# Patient Record
Sex: FEMALE | Race: WHITE | Employment: OTHER | URBAN - METROPOLITAN AREA
[De-identification: names, ages, dates, MRNs, and addresses within clinical notes are randomized per-mention and may not be internally consistent; named-entity substitution may affect disease eponyms.]

---

## 2018-12-01 ENCOUNTER — APPOINTMENT (OUTPATIENT)
Dept: ULTRASOUND IMAGING | Age: 63
End: 2018-12-01
Payer: COMMERCIAL

## 2018-12-01 ENCOUNTER — APPOINTMENT (OUTPATIENT)
Dept: GENERAL RADIOLOGY | Age: 63
End: 2018-12-01
Payer: COMMERCIAL

## 2018-12-01 ENCOUNTER — HOSPITAL ENCOUNTER (EMERGENCY)
Age: 63
Discharge: HOME | End: 2018-12-01
Attending: EMERGENCY MEDICINE
Payer: COMMERCIAL

## 2018-12-01 VITALS
HEART RATE: 90 BPM | DIASTOLIC BLOOD PRESSURE: 76 MMHG | SYSTOLIC BLOOD PRESSURE: 115 MMHG | TEMPERATURE: 98.7 F | OXYGEN SATURATION: 95 % | HEIGHT: 65 IN | BODY MASS INDEX: 29.99 KG/M2 | RESPIRATION RATE: 18 BRPM | WEIGHT: 180 LBS

## 2018-12-01 DIAGNOSIS — S40.021A CONTUSION OF RIGHT UPPER EXTREMITY, INITIAL ENCOUNTER: Primary | ICD-10-CM

## 2018-12-01 DIAGNOSIS — T79.6XXA TRAUMATIC RHABDOMYOLYSIS, INITIAL ENCOUNTER (HCC): ICD-10-CM

## 2018-12-01 LAB
ANION GAP SERPL CALCULATED.3IONS-SCNC: 13 MMOL/L (ref 7–16)
BACTERIA: NORMAL /HPF
BILIRUBIN URINE: NEGATIVE
BLOOD, URINE: NORMAL
BUN BLDV-MCNC: 15 MG/DL (ref 8–23)
CALCIUM SERPL-MCNC: 9.1 MG/DL (ref 8.6–10.2)
CHLORIDE BLD-SCNC: 101 MMOL/L (ref 98–107)
CLARITY: CLEAR
CO2: 26 MMOL/L (ref 22–29)
COLOR: YELLOW
CREAT SERPL-MCNC: 0.9 MG/DL (ref 0.5–1)
GFR AFRICAN AMERICAN: >60
GFR NON-AFRICAN AMERICAN: >60 ML/MIN/1.73
GLUCOSE BLD-MCNC: 98 MG/DL (ref 74–99)
GLUCOSE URINE: NEGATIVE MG/DL
HCT VFR BLD CALC: 42.4 % (ref 34–48)
HEMOGLOBIN: 13.1 G/DL (ref 11.5–15.5)
KETONES, URINE: NEGATIVE MG/DL
LEUKOCYTE ESTERASE, URINE: NEGATIVE
MCH RBC QN AUTO: 34.4 PG (ref 26–35)
MCHC RBC AUTO-ENTMCNC: 30.9 % (ref 32–34.5)
MCV RBC AUTO: 111.3 FL (ref 80–99.9)
NITRITE, URINE: NEGATIVE
PDW BLD-RTO: 17.9 FL (ref 11.5–15)
PH UA: 5.5 (ref 5–9)
PLATELET # BLD: 395 E9/L (ref 130–450)
PMV BLD AUTO: 11.2 FL (ref 7–12)
POTASSIUM SERPL-SCNC: 3.8 MMOL/L (ref 3.5–5)
PROTEIN UA: NEGATIVE MG/DL
RBC # BLD: 3.81 E12/L (ref 3.5–5.5)
RBC UA: NORMAL /HPF (ref 0–2)
SODIUM BLD-SCNC: 140 MMOL/L (ref 132–146)
SPECIFIC GRAVITY UA: 1.02 (ref 1–1.03)
TOTAL CK: 285 U/L (ref 20–180)
UROBILINOGEN, URINE: 0.2 E.U./DL
WBC # BLD: 4.7 E9/L (ref 4.5–11.5)
WBC UA: NORMAL /HPF (ref 0–5)

## 2018-12-01 PROCEDURE — 82550 ASSAY OF CK (CPK): CPT

## 2018-12-01 PROCEDURE — 73060 X-RAY EXAM OF HUMERUS: CPT

## 2018-12-01 PROCEDURE — 99284 EMERGENCY DEPT VISIT MOD MDM: CPT

## 2018-12-01 PROCEDURE — 80048 BASIC METABOLIC PNL TOTAL CA: CPT

## 2018-12-01 PROCEDURE — 81001 URINALYSIS AUTO W/SCOPE: CPT

## 2018-12-01 PROCEDURE — 85027 COMPLETE CBC AUTOMATED: CPT

## 2018-12-01 PROCEDURE — 93971 EXTREMITY STUDY: CPT

## 2018-12-01 RX ORDER — HYDROCODONE BITARTRATE AND ACETAMINOPHEN 5; 325 MG/1; MG/1
1 TABLET ORAL EVERY 6 HOURS PRN
Qty: 12 TABLET | Refills: 0 | Status: SHIPPED | OUTPATIENT
Start: 2018-12-01 | End: 2018-12-04

## 2018-12-01 ASSESSMENT — PAIN SCALES - GENERAL: PAINLEVEL_OUTOF10: 5

## 2018-12-01 ASSESSMENT — PAIN DESCRIPTION - ORIENTATION: ORIENTATION: RIGHT

## 2018-12-01 ASSESSMENT — PAIN DESCRIPTION - DESCRIPTORS: DESCRIPTORS: ACHING

## 2018-12-01 ASSESSMENT — PAIN DESCRIPTION - LOCATION: LOCATION: ARM

## 2018-12-01 ASSESSMENT — PAIN DESCRIPTION - PAIN TYPE: TYPE: ACUTE PAIN

## 2019-01-01 ENCOUNTER — ANESTHESIA (OUTPATIENT)
Dept: OPERATING ROOM | Age: 64
DRG: 168 | End: 2019-01-01
Payer: COMMERCIAL

## 2019-01-01 ENCOUNTER — APPOINTMENT (OUTPATIENT)
Dept: CT IMAGING | Age: 64
DRG: 168 | End: 2019-01-01
Payer: COMMERCIAL

## 2019-01-01 ENCOUNTER — HOSPITAL ENCOUNTER (INPATIENT)
Age: 64
LOS: 3 days | Discharge: HOME | DRG: 168 | End: 2019-12-11
Attending: EMERGENCY MEDICINE | Admitting: INTERNAL MEDICINE
Payer: COMMERCIAL

## 2019-01-01 ENCOUNTER — APPOINTMENT (OUTPATIENT)
Dept: GENERAL RADIOLOGY | Age: 64
DRG: 168 | End: 2019-01-01
Payer: COMMERCIAL

## 2019-01-01 ENCOUNTER — ANESTHESIA EVENT (OUTPATIENT)
Dept: OPERATING ROOM | Age: 64
DRG: 168 | End: 2019-01-01
Payer: COMMERCIAL

## 2019-01-01 ENCOUNTER — APPOINTMENT (OUTPATIENT)
Dept: ULTRASOUND IMAGING | Age: 64
DRG: 168 | End: 2019-01-01
Payer: COMMERCIAL

## 2019-01-01 VITALS — SYSTOLIC BLOOD PRESSURE: 129 MMHG | DIASTOLIC BLOOD PRESSURE: 86 MMHG | OXYGEN SATURATION: 100 %

## 2019-01-01 VITALS
RESPIRATION RATE: 18 BRPM | WEIGHT: 172.8 LBS | DIASTOLIC BLOOD PRESSURE: 78 MMHG | SYSTOLIC BLOOD PRESSURE: 140 MMHG | HEART RATE: 101 BPM | TEMPERATURE: 97.8 F | BODY MASS INDEX: 28.79 KG/M2 | OXYGEN SATURATION: 100 % | HEIGHT: 65 IN

## 2019-01-01 DIAGNOSIS — I26.99 BILATERAL PULMONARY EMBOLISM (HCC): Primary | ICD-10-CM

## 2019-01-01 LAB
ALBUMIN SERPL-MCNC: 3.4 G/DL (ref 3.5–5.2)
ALBUMIN SERPL-MCNC: 3.8 G/DL (ref 3.5–5.2)
ALP BLD-CCNC: 105 U/L (ref 35–104)
ALP BLD-CCNC: 164 U/L (ref 35–104)
ALT SERPL-CCNC: 6 U/L (ref 0–32)
ALT SERPL-CCNC: 7 U/L (ref 0–32)
ANION GAP SERPL CALCULATED.3IONS-SCNC: 12 MMOL/L (ref 7–16)
ANION GAP SERPL CALCULATED.3IONS-SCNC: 13 MMOL/L (ref 7–16)
ANISOCYTOSIS: ABNORMAL
AST SERPL-CCNC: 15 U/L (ref 0–31)
AST SERPL-CCNC: 34 U/L (ref 0–31)
ATYPICAL LYMPHOCYTE RELATIVE PERCENT: 3.5 % (ref 0–4)
BACTERIA: ABNORMAL /HPF
BASOPHILS ABSOLUTE: 0.14 E9/L (ref 0–0.2)
BASOPHILS ABSOLUTE: 0.32 E9/L (ref 0–0.2)
BASOPHILS ABSOLUTE: 0.33 E9/L (ref 0–0.2)
BASOPHILS ABSOLUTE: 0.39 E9/L (ref 0–0.2)
BASOPHILS RELATIVE PERCENT: 1 % (ref 0–2)
BASOPHILS RELATIVE PERCENT: 2 % (ref 0–2)
BASOPHILS RELATIVE PERCENT: 2.6 % (ref 0–2)
BASOPHILS RELATIVE PERCENT: 2.7 % (ref 0–2)
BILIRUB SERPL-MCNC: 0.3 MG/DL (ref 0–1.2)
BILIRUB SERPL-MCNC: <0.2 MG/DL (ref 0–1.2)
BILIRUBIN URINE: NEGATIVE
BLASTS RELATIVE PERCENT: 1 % (ref 0–0)
BLASTS RELATIVE PERCENT: 4 % (ref 0–0)
BLOOD, URINE: ABNORMAL
BUN BLDV-MCNC: 19 MG/DL (ref 8–23)
BUN BLDV-MCNC: 21 MG/DL (ref 8–23)
CALCIUM SERPL-MCNC: 8.9 MG/DL (ref 8.6–10.2)
CALCIUM SERPL-MCNC: 9.1 MG/DL (ref 8.6–10.2)
CHLORIDE BLD-SCNC: 100 MMOL/L (ref 98–107)
CHLORIDE BLD-SCNC: 98 MMOL/L (ref 98–107)
CLARITY: CLEAR
CO2: 23 MMOL/L (ref 22–29)
CO2: 26 MMOL/L (ref 22–29)
COLOR: YELLOW
CREAT SERPL-MCNC: 1.4 MG/DL (ref 0.5–1)
CREAT SERPL-MCNC: 1.4 MG/DL (ref 0.5–1)
EKG ATRIAL RATE: 93 BPM
EKG P AXIS: 54 DEGREES
EKG P-R INTERVAL: 134 MS
EKG Q-T INTERVAL: 348 MS
EKG QRS DURATION: 86 MS
EKG QTC CALCULATION (BAZETT): 432 MS
EKG R AXIS: 3 DEGREES
EKG T AXIS: 27 DEGREES
EKG VENTRICULAR RATE: 93 BPM
EOSINOPHILS ABSOLUTE: 0 E9/L (ref 0.05–0.5)
EOSINOPHILS RELATIVE PERCENT: 0 % (ref 0–6)
EPITHELIAL CELLS, UA: ABNORMAL /HPF
GFR AFRICAN AMERICAN: 46
GFR AFRICAN AMERICAN: 46
GFR NON-AFRICAN AMERICAN: 38 ML/MIN/1.73
GFR NON-AFRICAN AMERICAN: 38 ML/MIN/1.73
GLUCOSE BLD-MCNC: 101 MG/DL (ref 74–99)
GLUCOSE BLD-MCNC: 127 MG/DL (ref 74–99)
GLUCOSE URINE: NEGATIVE MG/DL
HCT VFR BLD CALC: 43.9 % (ref 34–48)
HCT VFR BLD CALC: 45.8 % (ref 34–48)
HCT VFR BLD CALC: 46.2 % (ref 34–48)
HCT VFR BLD CALC: 47.2 % (ref 34–48)
HEMOGLOBIN: 12.6 G/DL (ref 11.5–15.5)
HEMOGLOBIN: 13.1 G/DL (ref 11.5–15.5)
HEMOGLOBIN: 13.3 G/DL (ref 11.5–15.5)
HEMOGLOBIN: 13.5 G/DL (ref 11.5–15.5)
HYPERSEGMENTED NEUTROPHILS: ABNORMAL
HYPOCHROMIA: ABNORMAL
INFLUENZA A BY PCR: NOT DETECTED
INFLUENZA B BY PCR: NOT DETECTED
KETONES, URINE: NEGATIVE MG/DL
LACTIC ACID: 2 MMOL/L (ref 0.5–2.2)
LEUKOCYTE ESTERASE, URINE: NEGATIVE
LYMPHOCYTES ABSOLUTE: 0.64 E9/L (ref 1.5–4)
LYMPHOCYTES ABSOLUTE: 1.58 E9/L (ref 1.5–4)
LYMPHOCYTES ABSOLUTE: 1.6 E9/L (ref 1.5–4)
LYMPHOCYTES ABSOLUTE: 1.66 E9/L (ref 1.5–4)
LYMPHOCYTES RELATIVE PERCENT: 10.7 % (ref 20–42)
LYMPHOCYTES RELATIVE PERCENT: 11 % (ref 20–42)
LYMPHOCYTES RELATIVE PERCENT: 4 % (ref 20–42)
LYMPHOCYTES RELATIVE PERCENT: 9.6 % (ref 20–42)
MCH RBC QN AUTO: 28.2 PG (ref 26–35)
MCH RBC QN AUTO: 28.4 PG (ref 26–35)
MCH RBC QN AUTO: 28.4 PG (ref 26–35)
MCH RBC QN AUTO: 28.7 PG (ref 26–35)
MCHC RBC AUTO-ENTMCNC: 28.6 % (ref 32–34.5)
MCHC RBC AUTO-ENTMCNC: 28.6 % (ref 32–34.5)
MCHC RBC AUTO-ENTMCNC: 28.7 % (ref 32–34.5)
MCHC RBC AUTO-ENTMCNC: 28.8 % (ref 32–34.5)
MCV RBC AUTO: 98.5 FL (ref 80–99.9)
MCV RBC AUTO: 99.1 FL (ref 80–99.9)
MCV RBC AUTO: 99.2 FL (ref 80–99.9)
MCV RBC AUTO: 99.8 FL (ref 80–99.9)
METAMYELOCYTES RELATIVE PERCENT: 0.9 % (ref 0–1)
METAMYELOCYTES RELATIVE PERCENT: 1 % (ref 0–1)
MONOCYTES ABSOLUTE: 0.16 E9/L (ref 0.1–0.95)
MONOCYTES ABSOLUTE: 0.26 E9/L (ref 0.1–0.95)
MONOCYTES ABSOLUTE: 0.43 E9/L (ref 0.1–0.95)
MONOCYTES ABSOLUTE: 1.3 E9/L (ref 0.1–0.95)
MONOCYTES RELATIVE PERCENT: 1 % (ref 2–12)
MONOCYTES RELATIVE PERCENT: 1.7 % (ref 2–12)
MONOCYTES RELATIVE PERCENT: 3 % (ref 2–12)
MONOCYTES RELATIVE PERCENT: 8.9 % (ref 2–12)
MYELOCYTE PERCENT: 2 % (ref 0–0)
NEUTROPHILS ABSOLUTE: 10.62 E9/L (ref 1.8–7.3)
NEUTROPHILS ABSOLUTE: 11.31 E9/L (ref 1.8–7.3)
NEUTROPHILS ABSOLUTE: 12.1 E9/L (ref 1.8–7.3)
NEUTROPHILS ABSOLUTE: 14.15 E9/L (ref 1.8–7.3)
NEUTROPHILS RELATIVE PERCENT: 77.7 % (ref 43–80)
NEUTROPHILS RELATIVE PERCENT: 81.7 % (ref 43–80)
NEUTROPHILS RELATIVE PERCENT: 84 % (ref 43–80)
NEUTROPHILS RELATIVE PERCENT: 86 % (ref 43–80)
NITRITE, URINE: NEGATIVE
NUCLEATED RED BLOOD CELLS: 0.9 /100 WBC
NUCLEATED RED BLOOD CELLS: 0.9 /100 WBC
NUCLEATED RED BLOOD CELLS: 2 /100 WBC
OVALOCYTES: ABNORMAL
PDW BLD-RTO: 21.3 FL (ref 11.5–15)
PDW BLD-RTO: 21.4 FL (ref 11.5–15)
PDW BLD-RTO: 21.5 FL (ref 11.5–15)
PDW BLD-RTO: 21.6 FL (ref 11.5–15)
PH UA: 5.5 (ref 5–9)
PLATELET # BLD: 793 E9/L (ref 130–450)
PLATELET # BLD: 807 E9/L (ref 130–450)
PLATELET # BLD: 816 E9/L (ref 130–450)
PLATELET # BLD: 897 E9/L (ref 130–450)
PMV BLD AUTO: 10.8 FL (ref 7–12)
PMV BLD AUTO: 11 FL (ref 7–12)
PMV BLD AUTO: 11.1 FL (ref 7–12)
PMV BLD AUTO: 11.1 FL (ref 7–12)
POIKILOCYTES: ABNORMAL
POLYCHROMASIA: ABNORMAL
POTASSIUM REFLEX MAGNESIUM: 4.5 MMOL/L (ref 3.5–5)
POTASSIUM REFLEX MAGNESIUM: 5.7 MMOL/L (ref 3.5–5)
POTASSIUM SERPL-SCNC: 4.8 MMOL/L (ref 3.5–5)
PRO-BNP: 314 PG/ML (ref 0–125)
PROTEIN UA: 30 MG/DL
RBC # BLD: 4.43 E12/L (ref 3.5–5.5)
RBC # BLD: 4.63 E12/L (ref 3.5–5.5)
RBC # BLD: 4.65 E12/L (ref 3.5–5.5)
RBC # BLD: 4.76 E12/L (ref 3.5–5.5)
RBC UA: ABNORMAL /HPF (ref 0–2)
SCHISTOCYTES: ABNORMAL
SODIUM BLD-SCNC: 134 MMOL/L (ref 132–146)
SODIUM BLD-SCNC: 138 MMOL/L (ref 132–146)
SPECIFIC GRAVITY UA: 1.02 (ref 1–1.03)
SPHEROCYTES: ABNORMAL
SPHEROCYTES: ABNORMAL
TOTAL PROTEIN: 8.2 G/DL (ref 6.4–8.3)
TOTAL PROTEIN: 9.1 G/DL (ref 6.4–8.3)
TROPONIN: <0.01 NG/ML (ref 0–0.03)
URINE CULTURE, ROUTINE: NORMAL
UROBILINOGEN, URINE: 0.2 E.U./DL
WBC # BLD: 12.8 E9/L (ref 4.5–11.5)
WBC # BLD: 14.4 E9/L (ref 4.5–11.5)
WBC # BLD: 14.5 E9/L (ref 4.5–11.5)
WBC # BLD: 15.9 E9/L (ref 4.5–11.5)
WBC UA: ABNORMAL /HPF (ref 0–5)

## 2019-01-01 PROCEDURE — C1894 INTRO/SHEATH, NON-LASER: HCPCS | Performed by: SURGERY

## 2019-01-01 PROCEDURE — 81001 URINALYSIS AUTO W/SCOPE: CPT

## 2019-01-01 PROCEDURE — 87502 INFLUENZA DNA AMP PROBE: CPT

## 2019-01-01 PROCEDURE — 2500000003 HC RX 250 WO HCPCS: Performed by: SURGERY

## 2019-01-01 PROCEDURE — 80053 COMPREHEN METABOLIC PANEL: CPT

## 2019-01-01 PROCEDURE — 2060000000 HC ICU INTERMEDIATE R&B

## 2019-01-01 PROCEDURE — C1880 VENA CAVA FILTER: HCPCS | Performed by: SURGERY

## 2019-01-01 PROCEDURE — 37191 INS ENDOVAS VENA CAVA FILTR: CPT | Performed by: SURGERY

## 2019-01-01 PROCEDURE — 6370000000 HC RX 637 (ALT 250 FOR IP): Performed by: INTERNAL MEDICINE

## 2019-01-01 PROCEDURE — 3209999900 FLUORO FOR SURGICAL PROCEDURES

## 2019-01-01 PROCEDURE — 85025 COMPLETE CBC W/AUTO DIFF WBC: CPT

## 2019-01-01 PROCEDURE — 3700000001 HC ADD 15 MINUTES (ANESTHESIA): Performed by: SURGERY

## 2019-01-01 PROCEDURE — 36415 COLL VENOUS BLD VENIPUNCTURE: CPT

## 2019-01-01 PROCEDURE — 2580000003 HC RX 258: Performed by: INTERNAL MEDICINE

## 2019-01-01 PROCEDURE — 99254 IP/OBS CNSLTJ NEW/EST MOD 60: CPT | Performed by: SURGERY

## 2019-01-01 PROCEDURE — 84484 ASSAY OF TROPONIN QUANT: CPT

## 2019-01-01 PROCEDURE — 93005 ELECTROCARDIOGRAM TRACING: CPT | Performed by: EMERGENCY MEDICINE

## 2019-01-01 PROCEDURE — 99284 EMERGENCY DEPT VISIT MOD MDM: CPT

## 2019-01-01 PROCEDURE — 7100000011 HC PHASE II RECOVERY - ADDTL 15 MIN: Performed by: SURGERY

## 2019-01-01 PROCEDURE — 71275 CT ANGIOGRAPHY CHEST: CPT

## 2019-01-01 PROCEDURE — 93970 EXTREMITY STUDY: CPT

## 2019-01-01 PROCEDURE — 6360000002 HC RX W HCPCS: Performed by: SURGERY

## 2019-01-01 PROCEDURE — 3700000000 HC ANESTHESIA ATTENDED CARE: Performed by: SURGERY

## 2019-01-01 PROCEDURE — C1769 GUIDE WIRE: HCPCS | Performed by: SURGERY

## 2019-01-01 PROCEDURE — 2580000003 HC RX 258: Performed by: NURSE ANESTHETIST, CERTIFIED REGISTERED

## 2019-01-01 PROCEDURE — 6360000002 HC RX W HCPCS: Performed by: INTERNAL MEDICINE

## 2019-01-01 PROCEDURE — 7100000010 HC PHASE II RECOVERY - FIRST 15 MIN: Performed by: SURGERY

## 2019-01-01 PROCEDURE — 93010 ELECTROCARDIOGRAM REPORT: CPT | Performed by: INTERNAL MEDICINE

## 2019-01-01 PROCEDURE — 6360000004 HC RX CONTRAST MEDICATION: Performed by: RADIOLOGY

## 2019-01-01 PROCEDURE — 6360000002 HC RX W HCPCS: Performed by: NURSE ANESTHETIST, CERTIFIED REGISTERED

## 2019-01-01 PROCEDURE — 3600000012 HC SURGERY LEVEL 2 ADDTL 15MIN: Performed by: SURGERY

## 2019-01-01 PROCEDURE — 83880 ASSAY OF NATRIURETIC PEPTIDE: CPT

## 2019-01-01 PROCEDURE — 6360000004 HC RX CONTRAST MEDICATION: Performed by: SURGERY

## 2019-01-01 PROCEDURE — 6370000000 HC RX 637 (ALT 250 FOR IP): Performed by: NURSE PRACTITIONER

## 2019-01-01 PROCEDURE — 87088 URINE BACTERIA CULTURE: CPT

## 2019-01-01 PROCEDURE — 06H03DZ INSERTION OF INTRALUMINAL DEVICE INTO INFERIOR VENA CAVA, PERCUTANEOUS APPROACH: ICD-10-PCS | Performed by: SURGERY

## 2019-01-01 PROCEDURE — 83605 ASSAY OF LACTIC ACID: CPT

## 2019-01-01 PROCEDURE — 2709999900 HC NON-CHARGEABLE SUPPLY: Performed by: SURGERY

## 2019-01-01 PROCEDURE — 6360000002 HC RX W HCPCS: Performed by: EMERGENCY MEDICINE

## 2019-01-01 PROCEDURE — 84132 ASSAY OF SERUM POTASSIUM: CPT

## 2019-01-01 PROCEDURE — 3600000002 HC SURGERY LEVEL 2 BASE: Performed by: SURGERY

## 2019-01-01 DEVICE — FILTER VASC L50MM DIA30MM INTRO 7FR L65CM VENA CAVA FEM W: Type: IMPLANTABLE DEVICE | Site: GROIN | Status: FUNCTIONAL

## 2019-01-01 RX ORDER — ACETAMINOPHEN 325 MG/1
650 TABLET ORAL EVERY 4 HOURS PRN
Status: DISCONTINUED | OUTPATIENT
Start: 2019-01-01 | End: 2019-01-01 | Stop reason: HOSPADM

## 2019-01-01 RX ORDER — LIDOCAINE HYDROCHLORIDE 10 MG/ML
INJECTION, SOLUTION INFILTRATION; PERINEURAL PRN
Status: DISCONTINUED | OUTPATIENT
Start: 2019-01-01 | End: 2019-01-01 | Stop reason: HOSPADM

## 2019-01-01 RX ORDER — ONDANSETRON 2 MG/ML
4 INJECTION INTRAMUSCULAR; INTRAVENOUS EVERY 6 HOURS PRN
Status: DISCONTINUED | OUTPATIENT
Start: 2019-01-01 | End: 2019-01-01 | Stop reason: HOSPADM

## 2019-01-01 RX ORDER — CEFAZOLIN SODIUM 1 G/3ML
INJECTION, POWDER, FOR SOLUTION INTRAMUSCULAR; INTRAVENOUS PRN
Status: DISCONTINUED | OUTPATIENT
Start: 2019-01-01 | End: 2019-01-01 | Stop reason: SDUPTHER

## 2019-01-01 RX ORDER — HYDROCODONE BITARTRATE AND ACETAMINOPHEN 5; 325 MG/1; MG/1
2 TABLET ORAL EVERY 6 HOURS PRN
Status: DISCONTINUED | OUTPATIENT
Start: 2019-01-01 | End: 2019-01-01 | Stop reason: HOSPADM

## 2019-01-01 RX ORDER — MIDAZOLAM HYDROCHLORIDE 1 MG/ML
INJECTION INTRAMUSCULAR; INTRAVENOUS PRN
Status: DISCONTINUED | OUTPATIENT
Start: 2019-01-01 | End: 2019-01-01 | Stop reason: SDUPTHER

## 2019-01-01 RX ORDER — HEPARIN SODIUM 1000 [USP'U]/ML
INJECTION, SOLUTION INTRAVENOUS; SUBCUTANEOUS PRN
Status: DISCONTINUED | OUTPATIENT
Start: 2019-01-01 | End: 2019-01-01 | Stop reason: HOSPADM

## 2019-01-01 RX ORDER — SODIUM CHLORIDE 0.9 % (FLUSH) 0.9 %
10 SYRINGE (ML) INJECTION EVERY 12 HOURS SCHEDULED
Status: DISCONTINUED | OUTPATIENT
Start: 2019-01-01 | End: 2019-01-01 | Stop reason: HOSPADM

## 2019-01-01 RX ORDER — HYDROXYUREA 500 MG/1
2000 CAPSULE ORAL DAILY
Qty: 28 CAPSULE | Refills: 0 | Status: ON HOLD | OUTPATIENT
Start: 2019-01-01 | End: 2020-01-01 | Stop reason: ALTCHOICE

## 2019-01-01 RX ORDER — HYDROCODONE BITARTRATE AND ACETAMINOPHEN 5; 325 MG/1; MG/1
1 TABLET ORAL EVERY 6 HOURS PRN
Status: DISCONTINUED | OUTPATIENT
Start: 2019-01-01 | End: 2019-01-01 | Stop reason: HOSPADM

## 2019-01-01 RX ORDER — SODIUM CHLORIDE 9 MG/ML
INJECTION, SOLUTION INTRAVENOUS CONTINUOUS PRN
Status: DISCONTINUED | OUTPATIENT
Start: 2019-01-01 | End: 2019-01-01 | Stop reason: SDUPTHER

## 2019-01-01 RX ORDER — HYDROXYUREA 500 MG/1
1500 CAPSULE ORAL DAILY
Status: DISCONTINUED | OUTPATIENT
Start: 2019-01-01 | End: 2019-01-01

## 2019-01-01 RX ORDER — PREDNISONE 1 MG/1
10 TABLET ORAL EVERY OTHER DAY
Status: DISCONTINUED | OUTPATIENT
Start: 2019-01-01 | End: 2019-01-01

## 2019-01-01 RX ORDER — ERGOCALCIFEROL 1.25 MG/1
50000 CAPSULE ORAL WEEKLY
Status: DISCONTINUED | OUTPATIENT
Start: 2019-01-01 | End: 2019-01-01

## 2019-01-01 RX ORDER — ERGOCALCIFEROL 1.25 MG/1
50000 CAPSULE ORAL
Status: DISCONTINUED | OUTPATIENT
Start: 2019-01-01 | End: 2019-01-01 | Stop reason: HOSPADM

## 2019-01-01 RX ORDER — FENTANYL CITRATE 50 UG/ML
50 INJECTION, SOLUTION INTRAMUSCULAR; INTRAVENOUS EVERY 5 MIN PRN
Status: DISCONTINUED | OUTPATIENT
Start: 2019-01-01 | End: 2019-01-01 | Stop reason: HOSPADM

## 2019-01-01 RX ORDER — PREDNISONE 10 MG/1
10 TABLET ORAL DAILY
Qty: 10 TABLET | Refills: 0 | Status: SHIPPED | OUTPATIENT
Start: 2019-01-01 | End: 2019-01-01

## 2019-01-01 RX ORDER — BUPIVACAINE HYDROCHLORIDE 2.5 MG/ML
INJECTION, SOLUTION EPIDURAL; INFILTRATION; INTRACAUDAL PRN
Status: DISCONTINUED | OUTPATIENT
Start: 2019-01-01 | End: 2019-01-01 | Stop reason: HOSPADM

## 2019-01-01 RX ORDER — PROPOFOL 10 MG/ML
INJECTION, EMULSION INTRAVENOUS PRN
Status: DISCONTINUED | OUTPATIENT
Start: 2019-01-01 | End: 2019-01-01 | Stop reason: SDUPTHER

## 2019-01-01 RX ORDER — HYDROXYUREA 500 MG/1
2000 CAPSULE ORAL DAILY
Status: DISCONTINUED | OUTPATIENT
Start: 2019-01-01 | End: 2019-01-01 | Stop reason: HOSPADM

## 2019-01-01 RX ORDER — PREDNISONE 1 MG/1
10 TABLET ORAL DAILY
Status: DISCONTINUED | OUTPATIENT
Start: 2019-01-01 | End: 2019-01-01 | Stop reason: HOSPADM

## 2019-01-01 RX ORDER — MULTIVIT-MIN/IRON/FOLIC ACID/K 18-600-40
2000 CAPSULE ORAL DAILY
COMMUNITY

## 2019-01-01 RX ORDER — CEFAZOLIN SODIUM 2 G/50ML
SOLUTION INTRAVENOUS
Status: DISPENSED
Start: 2019-01-01 | End: 2019-01-01

## 2019-01-01 RX ORDER — FENTANYL CITRATE 50 UG/ML
25 INJECTION, SOLUTION INTRAMUSCULAR; INTRAVENOUS EVERY 5 MIN PRN
Status: DISCONTINUED | OUTPATIENT
Start: 2019-01-01 | End: 2019-01-01 | Stop reason: HOSPADM

## 2019-01-01 RX ORDER — SODIUM CHLORIDE 0.9 % (FLUSH) 0.9 %
10 SYRINGE (ML) INJECTION PRN
Status: DISCONTINUED | OUTPATIENT
Start: 2019-01-01 | End: 2019-01-01 | Stop reason: HOSPADM

## 2019-01-01 RX ORDER — VITAMIN B COMPLEX
2000 TABLET ORAL
Status: DISCONTINUED | OUTPATIENT
Start: 2019-01-01 | End: 2019-01-01 | Stop reason: HOSPADM

## 2019-01-01 RX ADMIN — Medication 10 ML: at 21:00

## 2019-01-01 RX ADMIN — ENOXAPARIN SODIUM 80 MG: 80 INJECTION, SOLUTION INTRAVENOUS; SUBCUTANEOUS at 05:30

## 2019-01-01 RX ADMIN — ENOXAPARIN SODIUM 80 MG: 80 INJECTION, SOLUTION INTRAVENOUS; SUBCUTANEOUS at 21:45

## 2019-01-01 RX ADMIN — HYDROXYUREA 2000 MG: 500 CAPSULE ORAL at 20:30

## 2019-01-01 RX ADMIN — ENOXAPARIN SODIUM 80 MG: 80 INJECTION, SOLUTION INTRAVENOUS; SUBCUTANEOUS at 17:08

## 2019-01-01 RX ADMIN — HYDROCODONE BITARTRATE AND ACETAMINOPHEN 1 TABLET: 5; 325 TABLET ORAL at 22:32

## 2019-01-01 RX ADMIN — HYDROXYUREA 2000 MG: 500 CAPSULE ORAL at 21:46

## 2019-01-01 RX ADMIN — SODIUM CHLORIDE: 9 INJECTION, SOLUTION INTRAVENOUS at 19:22

## 2019-01-01 RX ADMIN — SERTRALINE HYDROCHLORIDE 25 MG: 50 TABLET ORAL at 21:45

## 2019-01-01 RX ADMIN — IOPAMIDOL 60 ML: 755 INJECTION, SOLUTION INTRAVENOUS at 16:16

## 2019-01-01 RX ADMIN — SERTRALINE HYDROCHLORIDE 25 MG: 50 TABLET ORAL at 17:08

## 2019-01-01 RX ADMIN — HYDROCODONE BITARTRATE AND ACETAMINOPHEN 2 TABLET: 5; 325 TABLET ORAL at 23:50

## 2019-01-01 RX ADMIN — Medication 10 ML: at 09:35

## 2019-01-01 RX ADMIN — Medication 10 ML: at 21:46

## 2019-01-01 RX ADMIN — PREDNISONE 10 MG: 5 TABLET ORAL at 09:35

## 2019-01-01 RX ADMIN — MIDAZOLAM 2 MG: 1 INJECTION INTRAMUSCULAR; INTRAVENOUS at 19:22

## 2019-01-01 RX ADMIN — VITAMIN D, TAB 1000IU (100/BT) 2000 UNITS: 25 TAB at 08:00

## 2019-01-01 RX ADMIN — HYDROCODONE BITARTRATE AND ACETAMINOPHEN 1 TABLET: 5; 325 TABLET ORAL at 09:07

## 2019-01-01 RX ADMIN — Medication 10 ML: at 08:30

## 2019-01-01 RX ADMIN — SERTRALINE HYDROCHLORIDE 25 MG: 50 TABLET ORAL at 20:59

## 2019-01-01 RX ADMIN — VITAMIN D, TAB 1000IU (100/BT) 2000 UNITS: 25 TAB at 09:36

## 2019-01-01 RX ADMIN — HYDROXYUREA 1500 MG: 500 CAPSULE ORAL at 20:59

## 2019-01-01 RX ADMIN — HYDROCODONE BITARTRATE AND ACETAMINOPHEN 2 TABLET: 5; 325 TABLET ORAL at 15:37

## 2019-01-01 RX ADMIN — Medication 10 ML: at 20:30

## 2019-01-01 RX ADMIN — PROPOFOL 50 MG: 10 INJECTION, EMULSION INTRAVENOUS at 19:37

## 2019-01-01 RX ADMIN — Medication 10 ML: at 08:00

## 2019-01-01 RX ADMIN — CEFAZOLIN 2000 MG: 1 INJECTION, POWDER, FOR SOLUTION INTRAMUSCULAR; INTRAVENOUS at 19:22

## 2019-01-01 RX ADMIN — HYDROCODONE BITARTRATE AND ACETAMINOPHEN 2 TABLET: 5; 325 TABLET ORAL at 09:35

## 2019-01-01 RX ADMIN — ENOXAPARIN SODIUM 80 MG: 80 INJECTION SUBCUTANEOUS at 17:35

## 2019-01-01 RX ADMIN — PREDNISONE 10 MG: 5 TABLET ORAL at 09:41

## 2019-01-01 RX ADMIN — PREDNISONE 10 MG: 5 TABLET ORAL at 08:00

## 2019-01-01 RX ADMIN — ENOXAPARIN SODIUM 80 MG: 80 INJECTION, SOLUTION INTRAVENOUS; SUBCUTANEOUS at 06:00

## 2019-01-01 RX ADMIN — ENOXAPARIN SODIUM 80 MG: 80 INJECTION, SOLUTION INTRAVENOUS; SUBCUTANEOUS at 05:16

## 2019-01-01 RX ADMIN — PROPOFOL 50 MG: 10 INJECTION, EMULSION INTRAVENOUS at 19:27

## 2019-01-01 RX ADMIN — ACETAMINOPHEN 650 MG: 325 TABLET ORAL at 20:59

## 2019-01-01 ASSESSMENT — PAIN DESCRIPTION - PAIN TYPE
TYPE: ACUTE PAIN

## 2019-01-01 ASSESSMENT — PAIN SCALES - GENERAL
PAINLEVEL_OUTOF10: 8
PAINLEVEL_OUTOF10: 6
PAINLEVEL_OUTOF10: 5
PAINLEVEL_OUTOF10: 4
PAINLEVEL_OUTOF10: 8
PAINLEVEL_OUTOF10: 7
PAINLEVEL_OUTOF10: 8
PAINLEVEL_OUTOF10: 0
PAINLEVEL_OUTOF10: 8
PAINLEVEL_OUTOF10: 0
PAINLEVEL_OUTOF10: 8
PAINLEVEL_OUTOF10: 4
PAINLEVEL_OUTOF10: 6
PAINLEVEL_OUTOF10: 4
PAINLEVEL_OUTOF10: 7
PAINLEVEL_OUTOF10: 0
PAINLEVEL_OUTOF10: 8
PAINLEVEL_OUTOF10: 0
PAINLEVEL_OUTOF10: 0
PAINLEVEL_OUTOF10: 8
PAINLEVEL_OUTOF10: 2
PAINLEVEL_OUTOF10: 3

## 2019-01-01 ASSESSMENT — PAIN DESCRIPTION - ORIENTATION
ORIENTATION: LEFT
ORIENTATION: LEFT;LOWER
ORIENTATION: LEFT
ORIENTATION: LEFT;LOWER
ORIENTATION: LOWER
ORIENTATION: LEFT
ORIENTATION: LEFT
ORIENTATION: LOWER

## 2019-01-01 ASSESSMENT — PULMONARY FUNCTION TESTS
PIF_VALUE: 0
PIF_VALUE: 1
PIF_VALUE: 0

## 2019-01-01 ASSESSMENT — PAIN DESCRIPTION - LOCATION
LOCATION: ABDOMEN
LOCATION: BACK
LOCATION: CHEST
LOCATION: BACK
LOCATION: ABDOMEN
LOCATION: BACK
LOCATION: BACK

## 2019-01-01 ASSESSMENT — PAIN DESCRIPTION - DESCRIPTORS
DESCRIPTORS: CRAMPING
DESCRIPTORS: CRAMPING
DESCRIPTORS: ACHING;DISCOMFORT;DULL
DESCRIPTORS: ACHING;CONSTANT;DISCOMFORT
DESCRIPTORS: ACHING
DESCRIPTORS: ACHING;CONSTANT;DISCOMFORT
DESCRIPTORS: ACHING
DESCRIPTORS: ACHING;DISCOMFORT;DULL

## 2019-01-01 ASSESSMENT — PAIN DESCRIPTION - FREQUENCY
FREQUENCY: CONTINUOUS
FREQUENCY: INTERMITTENT
FREQUENCY: INTERMITTENT
FREQUENCY: CONTINUOUS
FREQUENCY: INTERMITTENT
FREQUENCY: INTERMITTENT

## 2019-01-01 ASSESSMENT — ENCOUNTER SYMPTOMS
ABDOMINAL DISTENTION: 0
SHORTNESS OF BREATH: 1
SINUS PRESSURE: 0
EYE REDNESS: 0
BACK PAIN: 0
SORE THROAT: 0
VOMITING: 0
DIARRHEA: 0
COUGH: 1
NAUSEA: 0
EYE DISCHARGE: 0
SHORTNESS OF BREATH: 1
EYE PAIN: 0
WHEEZING: 0

## 2019-01-01 ASSESSMENT — PAIN - FUNCTIONAL ASSESSMENT

## 2019-01-01 ASSESSMENT — PAIN DESCRIPTION - PROGRESSION
CLINICAL_PROGRESSION: NOT CHANGED
CLINICAL_PROGRESSION: GRADUALLY WORSENING
CLINICAL_PROGRESSION: NOT CHANGED
CLINICAL_PROGRESSION: NOT CHANGED
CLINICAL_PROGRESSION: GRADUALLY WORSENING
CLINICAL_PROGRESSION: NOT CHANGED
CLINICAL_PROGRESSION: GRADUALLY WORSENING
CLINICAL_PROGRESSION: NOT CHANGED
CLINICAL_PROGRESSION: GRADUALLY WORSENING
CLINICAL_PROGRESSION: NOT CHANGED

## 2019-01-01 ASSESSMENT — PAIN DESCRIPTION - ONSET
ONSET: GRADUAL
ONSET: GRADUAL
ONSET: ON-GOING

## 2019-01-01 ASSESSMENT — PAIN SCALES - WONG BAKER: WONGBAKER_NUMERICALRESPONSE: 0

## 2019-01-16 ENCOUNTER — OFFICE VISIT (OUTPATIENT)
Dept: VASCULAR SURGERY | Age: 64
End: 2019-01-16
Payer: COMMERCIAL

## 2019-01-16 VITALS — HEART RATE: 74 BPM | SYSTOLIC BLOOD PRESSURE: 108 MMHG | DIASTOLIC BLOOD PRESSURE: 72 MMHG

## 2019-01-16 DIAGNOSIS — I83.811 VARICOSE VEINS OF RIGHT LOWER EXTREMITY WITH PAIN: Primary | ICD-10-CM

## 2019-01-16 PROCEDURE — 99203 OFFICE O/P NEW LOW 30 MIN: CPT | Performed by: NURSE PRACTITIONER

## 2019-08-27 ENCOUNTER — HOSPITAL ENCOUNTER (EMERGENCY)
Age: 64
Discharge: HOME | End: 2019-08-27
Attending: EMERGENCY MEDICINE
Payer: COMMERCIAL

## 2019-08-27 ENCOUNTER — APPOINTMENT (OUTPATIENT)
Dept: CT IMAGING | Age: 64
End: 2019-08-27
Payer: COMMERCIAL

## 2019-08-27 VITALS
SYSTOLIC BLOOD PRESSURE: 134 MMHG | WEIGHT: 188 LBS | HEART RATE: 82 BPM | HEIGHT: 65 IN | OXYGEN SATURATION: 96 % | BODY MASS INDEX: 31.32 KG/M2 | DIASTOLIC BLOOD PRESSURE: 78 MMHG | TEMPERATURE: 98.5 F | RESPIRATION RATE: 16 BRPM

## 2019-08-27 DIAGNOSIS — R10.9 FLANK PAIN: Primary | ICD-10-CM

## 2019-08-27 LAB
ANION GAP SERPL CALCULATED.3IONS-SCNC: 12 MMOL/L (ref 7–16)
ANISOCYTOSIS: ABNORMAL
BACTERIA: NORMAL /HPF
BASOPHILS ABSOLUTE: 0.14 E9/L (ref 0–0.2)
BASOPHILS RELATIVE PERCENT: 0.5 % (ref 0–2)
BILIRUBIN URINE: NEGATIVE
BLOOD, URINE: ABNORMAL
BUN BLDV-MCNC: 25 MG/DL (ref 8–23)
CALCIUM SERPL-MCNC: 8.8 MG/DL (ref 8.6–10.2)
CASTS: NORMAL /LPF
CHLORIDE BLD-SCNC: 101 MMOL/L (ref 98–107)
CLARITY: CLEAR
CO2: 24 MMOL/L (ref 22–29)
COLOR: YELLOW
CREAT SERPL-MCNC: 1.5 MG/DL (ref 0.5–1)
EOSINOPHILS ABSOLUTE: 0.09 E9/L (ref 0.05–0.5)
EOSINOPHILS RELATIVE PERCENT: 0.3 % (ref 0–6)
GFR AFRICAN AMERICAN: 42
GFR NON-AFRICAN AMERICAN: 35 ML/MIN/1.73
GLUCOSE BLD-MCNC: 137 MG/DL (ref 74–99)
GLUCOSE URINE: NEGATIVE MG/DL
HCT VFR BLD CALC: 41 % (ref 34–48)
HEMOGLOBIN: 12.4 G/DL (ref 11.5–15.5)
HYPERSEGMENTED NEUTROPHILS: ABNORMAL
IMMATURE GRANULOCYTES #: 1.07 E9/L
IMMATURE GRANULOCYTES %: 4 % (ref 0–5)
KETONES, URINE: NEGATIVE MG/DL
LACTIC ACID: 0.9 MMOL/L (ref 0.5–2.2)
LEUKOCYTE ESTERASE, URINE: NEGATIVE
LIPASE: 40 U/L (ref 13–60)
LYMPHOCYTES ABSOLUTE: 0.63 E9/L (ref 1.5–4)
LYMPHOCYTES RELATIVE PERCENT: 2.3 % (ref 20–42)
MAGNESIUM: 2 MG/DL (ref 1.6–2.6)
MCH RBC QN AUTO: 31.3 PG (ref 26–35)
MCHC RBC AUTO-ENTMCNC: 30.2 % (ref 32–34.5)
MCV RBC AUTO: 103.5 FL (ref 80–99.9)
MONOCYTES ABSOLUTE: 0.8 E9/L (ref 0.1–0.95)
MONOCYTES RELATIVE PERCENT: 3 % (ref 2–12)
NEUTROPHILS ABSOLUTE: 24.19 E9/L (ref 1.8–7.3)
NEUTROPHILS RELATIVE PERCENT: 89.9 % (ref 43–80)
NITRITE, URINE: NEGATIVE
OVALOCYTES: ABNORMAL
PDW BLD-RTO: 21 FL (ref 11.5–15)
PH UA: 6 (ref 5–9)
PLATELET # BLD: 791 E9/L (ref 130–450)
PMV BLD AUTO: 10.4 FL (ref 7–12)
POIKILOCYTES: ABNORMAL
POLYCHROMASIA: ABNORMAL
POTASSIUM REFLEX MAGNESIUM: 3.5 MMOL/L (ref 3.5–5)
PROTEIN UA: 30 MG/DL
RBC # BLD: 3.96 E12/L (ref 3.5–5.5)
RBC UA: NORMAL /HPF (ref 0–2)
ROULEAUX: ABNORMAL
SODIUM BLD-SCNC: 137 MMOL/L (ref 132–146)
SPECIFIC GRAVITY UA: 1.01 (ref 1–1.03)
TEAR DROP CELLS: ABNORMAL
UROBILINOGEN, URINE: 0.2 E.U./DL
WBC # BLD: 26.9 E9/L (ref 4.5–11.5)
WBC UA: NORMAL /HPF (ref 0–5)

## 2019-08-27 PROCEDURE — 96365 THER/PROPH/DIAG IV INF INIT: CPT

## 2019-08-27 PROCEDURE — 99284 EMERGENCY DEPT VISIT MOD MDM: CPT

## 2019-08-27 PROCEDURE — 96375 TX/PRO/DX INJ NEW DRUG ADDON: CPT

## 2019-08-27 PROCEDURE — 83690 ASSAY OF LIPASE: CPT

## 2019-08-27 PROCEDURE — 80048 BASIC METABOLIC PNL TOTAL CA: CPT

## 2019-08-27 PROCEDURE — 83605 ASSAY OF LACTIC ACID: CPT

## 2019-08-27 PROCEDURE — 87040 BLOOD CULTURE FOR BACTERIA: CPT

## 2019-08-27 PROCEDURE — 81001 URINALYSIS AUTO W/SCOPE: CPT

## 2019-08-27 PROCEDURE — 74176 CT ABD & PELVIS W/O CONTRAST: CPT

## 2019-08-27 PROCEDURE — 6360000002 HC RX W HCPCS: Performed by: STUDENT IN AN ORGANIZED HEALTH CARE EDUCATION/TRAINING PROGRAM

## 2019-08-27 PROCEDURE — 2580000003 HC RX 258: Performed by: STUDENT IN AN ORGANIZED HEALTH CARE EDUCATION/TRAINING PROGRAM

## 2019-08-27 PROCEDURE — 83735 ASSAY OF MAGNESIUM: CPT

## 2019-08-27 PROCEDURE — 85025 COMPLETE CBC W/AUTO DIFF WBC: CPT

## 2019-08-27 RX ORDER — CIPROFLOXACIN 500 MG/1
500 TABLET, FILM COATED ORAL 2 TIMES DAILY
COMMUNITY
End: 2019-08-27

## 2019-08-27 RX ORDER — ONDANSETRON 2 MG/ML
4 INJECTION INTRAMUSCULAR; INTRAVENOUS ONCE
Status: COMPLETED | OUTPATIENT
Start: 2019-08-27 | End: 2019-08-27

## 2019-08-27 RX ORDER — 0.9 % SODIUM CHLORIDE 0.9 %
500 INTRAVENOUS SOLUTION INTRAVENOUS ONCE
Status: COMPLETED | OUTPATIENT
Start: 2019-08-27 | End: 2019-08-27

## 2019-08-27 RX ORDER — CEFDINIR 300 MG/1
300 CAPSULE ORAL 2 TIMES DAILY
Qty: 10 CAPSULE | Refills: 0 | Status: SHIPPED | OUTPATIENT
Start: 2019-08-27 | End: 2019-09-01

## 2019-08-27 RX ORDER — KETOROLAC TROMETHAMINE 30 MG/ML
30 INJECTION, SOLUTION INTRAMUSCULAR; INTRAVENOUS ONCE
Status: COMPLETED | OUTPATIENT
Start: 2019-08-27 | End: 2019-08-27

## 2019-08-27 RX ADMIN — ONDANSETRON 4 MG: 2 INJECTION INTRAMUSCULAR; INTRAVENOUS at 12:44

## 2019-08-27 RX ADMIN — KETOROLAC TROMETHAMINE 30 MG: 30 INJECTION, SOLUTION INTRAMUSCULAR at 12:44

## 2019-08-27 RX ADMIN — CEFTRIAXONE 2 G: 2 INJECTION, POWDER, FOR SOLUTION INTRAMUSCULAR; INTRAVENOUS at 15:07

## 2019-08-27 RX ADMIN — SODIUM CHLORIDE 500 ML: 9 INJECTION, SOLUTION INTRAVENOUS at 12:46

## 2019-08-27 ASSESSMENT — ENCOUNTER SYMPTOMS
EYE DISCHARGE: 0
ABDOMINAL PAIN: 1
BLOOD IN STOOL: 0
ABDOMINAL DISTENTION: 0
WHEEZING: 0
NAUSEA: 1
DIARRHEA: 1
EYE REDNESS: 0
EYE PAIN: 0
BACK PAIN: 0
SHORTNESS OF BREATH: 0
COUGH: 0
SINUS PRESSURE: 0
VOMITING: 1
SORE THROAT: 0

## 2019-08-27 ASSESSMENT — PAIN DESCRIPTION - LOCATION
LOCATION: ABDOMEN
LOCATION: FLANK

## 2019-08-27 ASSESSMENT — PAIN SCALES - GENERAL
PAINLEVEL_OUTOF10: 2
PAINLEVEL_OUTOF10: 2
PAINLEVEL_OUTOF10: 8

## 2019-08-27 ASSESSMENT — PAIN DESCRIPTION - PAIN TYPE
TYPE: ACUTE PAIN
TYPE: ACUTE PAIN

## 2019-08-27 ASSESSMENT — PAIN DESCRIPTION - DESCRIPTORS: DESCRIPTORS: ACHING;THROBBING

## 2019-08-27 ASSESSMENT — PAIN DESCRIPTION - FREQUENCY: FREQUENCY: CONTINUOUS

## 2019-08-27 ASSESSMENT — PAIN DESCRIPTION - ORIENTATION
ORIENTATION: RIGHT
ORIENTATION: RIGHT;LOWER

## 2019-08-27 ASSESSMENT — PAIN - FUNCTIONAL ASSESSMENT: PAIN_FUNCTIONAL_ASSESSMENT: ACTIVITIES ARE NOT PREVENTED

## 2019-08-27 NOTE — ED PROVIDER NOTES
Microscopic Urinalysis   Result Value Ref Range    Casts FEW /LPF    WBC, UA NONE 0 - 5 /HPF    RBC, UA NONE 0 - 2 /HPF    Bacteria, UA NONE /HPF       Radiology:  CT ABDOMEN PELVIS WO CONTRAST Additional Contrast? None   Final Result   Hepatosplenomegaly    Findings suspicious for portal hypertension   There are small parenchymal nonobstructive renal calculi     Mild diverticulosis                                  ------------------------- NURSING NOTES AND VITALS REVIEWED ---------------------------  Date / Time Roomed:  8/27/2019 12:18 PM  ED Bed Assignment:  [id number]    The nursing notes within the ED encounter and vital signs as below have been reviewed. /86   Pulse 106   Temp 98.5 °F (36.9 °C)   Resp 16   Ht 5' 5\" (1.651 m)   Wt 188 lb (85.3 kg)   SpO2 95%   BMI 31.28 kg/m²   Oxygen Saturation Interpretation: Normal      ------------------------------------------ PROGRESS NOTES ------------------------------------------  I have spoken with the patient and discussed todays results, in addition to providing specific details for the plan of care and counseling regarding the diagnosis and prognosis. Their questions are answered at this time and they are agreeable with the plan. I discussed at length with them reasons for immediate return here for re evaluation. They will followup with primary care by calling their office tomorrow. --------------------------------- ADDITIONAL PROVIDER NOTES ---------------------------------  At this time the patient is without objective evidence of an acute process requiring hospitalization or inpatient management. They have remained hemodynamically stable throughout their entire ED visit and are stable for discharge with outpatient follow-up. The plan has been discussed in detail and they are aware of the specific conditions for emergent return, as well as the importance of follow-up.       New Prescriptions    CEFDINIR (OMNICEF) 300 MG CAPSULE

## 2019-09-01 LAB
BLOOD CULTURE, ROUTINE: NORMAL
CULTURE, BLOOD 2: NORMAL

## 2019-09-04 ENCOUNTER — APPOINTMENT (OUTPATIENT)
Dept: CT IMAGING | Age: 64
End: 2019-09-04
Payer: COMMERCIAL

## 2019-09-04 ENCOUNTER — HOSPITAL ENCOUNTER (EMERGENCY)
Age: 64
Discharge: HOME | End: 2019-09-04
Attending: EMERGENCY MEDICINE
Payer: COMMERCIAL

## 2019-09-04 ENCOUNTER — APPOINTMENT (OUTPATIENT)
Dept: ULTRASOUND IMAGING | Age: 64
End: 2019-09-04
Payer: COMMERCIAL

## 2019-09-04 VITALS
TEMPERATURE: 97.7 F | SYSTOLIC BLOOD PRESSURE: 137 MMHG | HEIGHT: 65 IN | DIASTOLIC BLOOD PRESSURE: 78 MMHG | BODY MASS INDEX: 28.16 KG/M2 | OXYGEN SATURATION: 96 % | RESPIRATION RATE: 14 BRPM | WEIGHT: 169 LBS | HEART RATE: 86 BPM

## 2019-09-04 DIAGNOSIS — R10.11 RIGHT UPPER QUADRANT ABDOMINAL PAIN: ICD-10-CM

## 2019-09-04 DIAGNOSIS — N12 PYELONEPHRITIS: Primary | ICD-10-CM

## 2019-09-04 DIAGNOSIS — R10.9 RIGHT SIDED ABDOMINAL PAIN: ICD-10-CM

## 2019-09-04 LAB
ALBUMIN SERPL-MCNC: 3.9 G/DL (ref 3.5–5.2)
ALP BLD-CCNC: 88 U/L (ref 35–104)
ALT SERPL-CCNC: <5 U/L (ref 0–32)
ANION GAP SERPL CALCULATED.3IONS-SCNC: 17 MMOL/L (ref 7–16)
ANISOCYTOSIS: ABNORMAL
AST SERPL-CCNC: 21 U/L (ref 0–31)
BACTERIA: ABNORMAL /HPF
BASOPHILS ABSOLUTE: 0.1 E9/L (ref 0–0.2)
BASOPHILS RELATIVE PERCENT: 0.9 % (ref 0–2)
BILIRUB SERPL-MCNC: 0.4 MG/DL (ref 0–1.2)
BILIRUBIN URINE: ABNORMAL
BLOOD, URINE: ABNORMAL
BUN BLDV-MCNC: 20 MG/DL (ref 8–23)
CALCIUM SERPL-MCNC: 9.6 MG/DL (ref 8.6–10.2)
CHLORIDE BLD-SCNC: 98 MMOL/L (ref 98–107)
CLARITY: CLEAR
CO2: 21 MMOL/L (ref 22–29)
COLOR: YELLOW
CREAT SERPL-MCNC: 1.3 MG/DL (ref 0.5–1)
EOSINOPHILS ABSOLUTE: 0.1 E9/L (ref 0.05–0.5)
EOSINOPHILS RELATIVE PERCENT: 0.9 % (ref 0–6)
GFR AFRICAN AMERICAN: 50
GFR NON-AFRICAN AMERICAN: 41 ML/MIN/1.73
GLUCOSE BLD-MCNC: 80 MG/DL (ref 74–99)
GLUCOSE URINE: NEGATIVE MG/DL
HCT VFR BLD CALC: 46 % (ref 34–48)
HEMOGLOBIN: 13.6 G/DL (ref 11.5–15.5)
HYPERSEGMENTED NEUTROPHILS: ABNORMAL
KETONES, URINE: 15 MG/DL
LACTIC ACID: 0.8 MMOL/L (ref 0.5–2.2)
LEUKOCYTE ESTERASE, URINE: NEGATIVE
LIPASE: 36 U/L (ref 13–60)
LYMPHOCYTES ABSOLUTE: 1.03 E9/L (ref 1.5–4)
LYMPHOCYTES RELATIVE PERCENT: 8.8 % (ref 20–42)
MCH RBC QN AUTO: 30.2 PG (ref 26–35)
MCHC RBC AUTO-ENTMCNC: 29.6 % (ref 32–34.5)
MCV RBC AUTO: 102 FL (ref 80–99.9)
MONOCYTES ABSOLUTE: 0.34 E9/L (ref 0.1–0.95)
MONOCYTES RELATIVE PERCENT: 2.7 % (ref 2–12)
NEUTROPHILS ABSOLUTE: 9.92 E9/L (ref 1.8–7.3)
NEUTROPHILS RELATIVE PERCENT: 86.7 % (ref 43–80)
NITRITE, URINE: NEGATIVE
NUCLEATED RED BLOOD CELLS: 0 /100 WBC
OVALOCYTES: ABNORMAL
PDW BLD-RTO: 20.1 FL (ref 11.5–15)
PH UA: 5.5 (ref 5–9)
PLATELET # BLD: 878 E9/L (ref 130–450)
PMV BLD AUTO: 10.7 FL (ref 7–12)
POIKILOCYTES: ABNORMAL
POLYCHROMASIA: ABNORMAL
POTASSIUM SERPL-SCNC: 4.1 MMOL/L (ref 3.5–5)
PROTEIN UA: 100 MG/DL
RBC # BLD: 4.51 E12/L (ref 3.5–5.5)
RBC UA: ABNORMAL /HPF (ref 0–2)
SODIUM BLD-SCNC: 136 MMOL/L (ref 132–146)
SPECIFIC GRAVITY UA: >=1.03 (ref 1–1.03)
SPHEROCYTES: ABNORMAL
TOTAL PROTEIN: 9.2 G/DL (ref 6.4–8.3)
UROBILINOGEN, URINE: 0.2 E.U./DL
VACUOLATED NEUTROPHILS: ABNORMAL
WBC # BLD: 11.4 E9/L (ref 4.5–11.5)
WBC UA: ABNORMAL /HPF (ref 0–5)

## 2019-09-04 PROCEDURE — 6360000002 HC RX W HCPCS: Performed by: EMERGENCY MEDICINE

## 2019-09-04 PROCEDURE — 2580000003 HC RX 258: Performed by: EMERGENCY MEDICINE

## 2019-09-04 PROCEDURE — 87088 URINE BACTERIA CULTURE: CPT

## 2019-09-04 PROCEDURE — 81001 URINALYSIS AUTO W/SCOPE: CPT

## 2019-09-04 PROCEDURE — 6360000004 HC RX CONTRAST MEDICATION: Performed by: RADIOLOGY

## 2019-09-04 PROCEDURE — 74177 CT ABD & PELVIS W/CONTRAST: CPT

## 2019-09-04 PROCEDURE — 96375 TX/PRO/DX INJ NEW DRUG ADDON: CPT

## 2019-09-04 PROCEDURE — 83690 ASSAY OF LIPASE: CPT

## 2019-09-04 PROCEDURE — 85025 COMPLETE CBC W/AUTO DIFF WBC: CPT

## 2019-09-04 PROCEDURE — 80053 COMPREHEN METABOLIC PANEL: CPT

## 2019-09-04 PROCEDURE — 76705 ECHO EXAM OF ABDOMEN: CPT

## 2019-09-04 PROCEDURE — 96365 THER/PROPH/DIAG IV INF INIT: CPT

## 2019-09-04 PROCEDURE — 83605 ASSAY OF LACTIC ACID: CPT

## 2019-09-04 PROCEDURE — 76770 US EXAM ABDO BACK WALL COMP: CPT

## 2019-09-04 PROCEDURE — 93005 ELECTROCARDIOGRAM TRACING: CPT | Performed by: EMERGENCY MEDICINE

## 2019-09-04 PROCEDURE — 99284 EMERGENCY DEPT VISIT MOD MDM: CPT

## 2019-09-04 RX ORDER — ONDANSETRON 4 MG/1
4 TABLET, FILM COATED ORAL EVERY 8 HOURS PRN
Qty: 12 TABLET | Refills: 0 | Status: SHIPPED | OUTPATIENT
Start: 2019-09-04 | End: 2019-09-09

## 2019-09-04 RX ORDER — ONDANSETRON 2 MG/ML
8 INJECTION INTRAMUSCULAR; INTRAVENOUS ONCE
Status: COMPLETED | OUTPATIENT
Start: 2019-09-04 | End: 2019-09-04

## 2019-09-04 RX ORDER — MORPHINE SULFATE 4 MG/ML
4 INJECTION, SOLUTION INTRAMUSCULAR; INTRAVENOUS ONCE
Status: COMPLETED | OUTPATIENT
Start: 2019-09-04 | End: 2019-09-04

## 2019-09-04 RX ORDER — CEPHALEXIN 500 MG/1
500 CAPSULE ORAL 3 TIMES DAILY
Qty: 42 CAPSULE | Refills: 0 | Status: SHIPPED | OUTPATIENT
Start: 2019-09-04 | End: 2019-09-04

## 2019-09-04 RX ORDER — CEPHALEXIN 500 MG/1
500 CAPSULE ORAL 2 TIMES DAILY
Qty: 14 CAPSULE | Refills: 0 | Status: SHIPPED | OUTPATIENT
Start: 2019-09-04 | End: 2019-09-04 | Stop reason: SDUPTHER

## 2019-09-04 RX ORDER — 0.9 % SODIUM CHLORIDE 0.9 %
1000 INTRAVENOUS SOLUTION INTRAVENOUS ONCE
Status: COMPLETED | OUTPATIENT
Start: 2019-09-04 | End: 2019-09-04

## 2019-09-04 RX ORDER — KETOROLAC TROMETHAMINE 30 MG/ML
15 INJECTION, SOLUTION INTRAMUSCULAR; INTRAVENOUS ONCE
Status: COMPLETED | OUTPATIENT
Start: 2019-09-04 | End: 2019-09-04

## 2019-09-04 RX ORDER — HYDROCODONE BITARTRATE AND ACETAMINOPHEN 5; 325 MG/1; MG/1
1 TABLET ORAL EVERY 6 HOURS PRN
Qty: 12 TABLET | Refills: 0 | Status: SHIPPED | OUTPATIENT
Start: 2019-09-04 | End: 2019-09-07

## 2019-09-04 RX ORDER — LEVOFLOXACIN 750 MG/1
750 TABLET ORAL EVERY OTHER DAY
Qty: 7 TABLET | Refills: 0 | Status: SHIPPED | OUTPATIENT
Start: 2019-09-04 | End: 2019-09-18

## 2019-09-04 RX ORDER — SODIUM CHLORIDE 0.9 % (FLUSH) 0.9 %
10 SYRINGE (ML) INJECTION PRN
Status: DISCONTINUED | OUTPATIENT
Start: 2019-09-04 | End: 2019-09-04 | Stop reason: HOSPADM

## 2019-09-04 RX ADMIN — ONDANSETRON 8 MG: 2 INJECTION INTRAMUSCULAR; INTRAVENOUS at 10:44

## 2019-09-04 RX ADMIN — SODIUM CHLORIDE 1000 ML: 9 INJECTION, SOLUTION INTRAVENOUS at 10:42

## 2019-09-04 RX ADMIN — MORPHINE SULFATE 4 MG: 4 INJECTION, SOLUTION INTRAMUSCULAR; INTRAVENOUS at 10:47

## 2019-09-04 RX ADMIN — KETOROLAC TROMETHAMINE 15 MG: 30 INJECTION, SOLUTION INTRAMUSCULAR at 10:45

## 2019-09-04 RX ADMIN — MEROPENEM 1 G: 1 INJECTION, POWDER, FOR SOLUTION INTRAVENOUS at 14:47

## 2019-09-04 RX ADMIN — IOPAMIDOL 110 ML: 755 INJECTION, SOLUTION INTRAVENOUS at 10:16

## 2019-09-04 ASSESSMENT — PAIN SCALES - GENERAL
PAINLEVEL_OUTOF10: 8
PAINLEVEL_OUTOF10: 10
PAINLEVEL_OUTOF10: 1

## 2019-09-04 ASSESSMENT — PAIN DESCRIPTION - DESCRIPTORS
DESCRIPTORS: HEAVINESS;THROBBING
DESCRIPTORS: ACHING

## 2019-09-04 ASSESSMENT — PAIN DESCRIPTION - ORIENTATION: ORIENTATION: RIGHT

## 2019-09-04 ASSESSMENT — PAIN DESCRIPTION - PAIN TYPE
TYPE: ACUTE PAIN
TYPE: ACUTE PAIN

## 2019-09-04 ASSESSMENT — PAIN DESCRIPTION - LOCATION
LOCATION: ABDOMEN
LOCATION: ABDOMEN;BACK

## 2019-09-04 ASSESSMENT — PAIN DESCRIPTION - FREQUENCY
FREQUENCY: CONTINUOUS
FREQUENCY: CONTINUOUS

## 2019-09-04 NOTE — ED PROVIDER NOTES
tenderness compression with ultrasound transducer. The common bile duct measures 5.5 mm diameter, which is normal for   age. Splenomegaly was confirmed on the contemporaneous renal ultrasound   evaluation, measuring up to 18 cm diameter. There is limited visualization of the liver, which appears   unremarkable, and no evidence of right upper quadrant ascites. IMPRESSION:   Directed gallbladder ultrasound study showing a normal gallbladder and   common bile duct. US GALLBLADDER RUQ   Final Result   CT images are more sensitive for identifying perfusion abnormality   such as pyelonephritic change in this ultrasound exam. Ultrasound does   not confirm dominant cortical cystic changes, obstructive dilation,   calcification, or vascular abnormalities. Splenomegaly to 18 cm diameter is noted. LIMITED DIRECTED GALLBLADDER ULTRASOUND:      Number of images: 21      COMPARISON: Contemporaneous CT images of the abdomen and pelvis with   contrast one hour earlier      TECHNIQUE:   2-D longitudinal and transverse grayscale directed ultrasound images   were utilized for evaluation of the gallbladder. Color Doppler imaging   was used to evaluate vascularity in the examined region. FINDINGS:   The gallbladder is normal in appearance with no evidence of   intraluminal debris, mural thickening, pericholecystic fluid,   tenderness compression with ultrasound transducer. The common bile duct measures 5.5 mm diameter, which is normal for   age. Splenomegaly was confirmed on the contemporaneous renal ultrasound   evaluation, measuring up to 18 cm diameter. There is limited visualization of the liver, which appears   unremarkable, and no evidence of right upper quadrant ascites. IMPRESSION:   Directed gallbladder ultrasound study showing a normal gallbladder and   common bile duct.          CT ABDOMEN PELVIS W IV CONTRAST Additional Contrast? None   Final Result

## 2019-09-05 LAB
EKG ATRIAL RATE: 82 BPM
EKG P AXIS: 63 DEGREES
EKG P-R INTERVAL: 138 MS
EKG Q-T INTERVAL: 386 MS
EKG QRS DURATION: 86 MS
EKG QTC CALCULATION (BAZETT): 450 MS
EKG R AXIS: 13 DEGREES
EKG T AXIS: 41 DEGREES
EKG VENTRICULAR RATE: 82 BPM

## 2019-09-05 PROCEDURE — 93010 ELECTROCARDIOGRAM REPORT: CPT | Performed by: INTERNAL MEDICINE

## 2019-09-06 LAB — URINE CULTURE, ROUTINE: NORMAL

## 2019-10-18 ENCOUNTER — APPOINTMENT (OUTPATIENT)
Dept: CT IMAGING | Age: 64
End: 2019-10-18
Payer: COMMERCIAL

## 2019-10-18 ENCOUNTER — HOSPITAL ENCOUNTER (EMERGENCY)
Age: 64
Discharge: HOME | End: 2019-10-18
Attending: EMERGENCY MEDICINE
Payer: COMMERCIAL

## 2019-10-18 VITALS
HEART RATE: 94 BPM | RESPIRATION RATE: 16 BRPM | DIASTOLIC BLOOD PRESSURE: 83 MMHG | BODY MASS INDEX: 28.32 KG/M2 | SYSTOLIC BLOOD PRESSURE: 151 MMHG | TEMPERATURE: 99.2 F | HEIGHT: 65 IN | WEIGHT: 170 LBS | OXYGEN SATURATION: 97 %

## 2019-10-18 DIAGNOSIS — K57.32 DIVERTICULITIS OF COLON: Primary | ICD-10-CM

## 2019-10-18 LAB
ALBUMIN SERPL-MCNC: 3.6 G/DL (ref 3.5–5.2)
ALP BLD-CCNC: 98 U/L (ref 35–104)
ALT SERPL-CCNC: <5 U/L (ref 0–32)
ANION GAP SERPL CALCULATED.3IONS-SCNC: 15 MMOL/L (ref 7–16)
AST SERPL-CCNC: 19 U/L (ref 0–31)
BACTERIA: ABNORMAL /HPF
BASOPHILS ABSOLUTE: 0.11 E9/L (ref 0–0.2)
BASOPHILS RELATIVE PERCENT: 0.6 % (ref 0–2)
BILIRUB SERPL-MCNC: 0.4 MG/DL (ref 0–1.2)
BILIRUBIN DIRECT: <0.2 MG/DL (ref 0–0.3)
BILIRUBIN URINE: ABNORMAL
BILIRUBIN, INDIRECT: ABNORMAL MG/DL (ref 0–1)
BLOOD, URINE: ABNORMAL
BUN BLDV-MCNC: 18 MG/DL (ref 8–23)
CALCIUM SERPL-MCNC: 9.6 MG/DL (ref 8.6–10.2)
CHLORIDE BLD-SCNC: 97 MMOL/L (ref 98–107)
CLARITY: ABNORMAL
CO2: 24 MMOL/L (ref 22–29)
COLOR: YELLOW
CREAT SERPL-MCNC: 1.5 MG/DL (ref 0.5–1)
EOSINOPHILS ABSOLUTE: 0.08 E9/L (ref 0.05–0.5)
EOSINOPHILS RELATIVE PERCENT: 0.4 % (ref 0–6)
EPITHELIAL CELLS, UA: ABNORMAL /HPF
GFR AFRICAN AMERICAN: 42
GFR NON-AFRICAN AMERICAN: 35 ML/MIN/1.73
GLUCOSE BLD-MCNC: 105 MG/DL (ref 74–99)
GLUCOSE URINE: NEGATIVE MG/DL
HCT VFR BLD CALC: 48.1 % (ref 34–48)
HEMOGLOBIN: 14.2 G/DL (ref 11.5–15.5)
IMMATURE GRANULOCYTES #: 0.49 E9/L
IMMATURE GRANULOCYTES %: 2.6 % (ref 0–5)
KETONES, URINE: NEGATIVE MG/DL
LEUKOCYTE ESTERASE, URINE: ABNORMAL
LIPASE: 36 U/L (ref 13–60)
LYMPHOCYTES ABSOLUTE: 0.87 E9/L (ref 1.5–4)
LYMPHOCYTES RELATIVE PERCENT: 4.6 % (ref 20–42)
MCH RBC QN AUTO: 29.3 PG (ref 26–35)
MCHC RBC AUTO-ENTMCNC: 29.5 % (ref 32–34.5)
MCV RBC AUTO: 99.4 FL (ref 80–99.9)
MONOCYTES ABSOLUTE: 1.38 E9/L (ref 0.1–0.95)
MONOCYTES RELATIVE PERCENT: 7.3 % (ref 2–12)
NEUTROPHILS ABSOLUTE: 16.06 E9/L (ref 1.8–7.3)
NEUTROPHILS RELATIVE PERCENT: 84.5 % (ref 43–80)
NITRITE, URINE: NEGATIVE
PDW BLD-RTO: 19.7 FL (ref 11.5–15)
PH UA: 6 (ref 5–9)
PLATELET # BLD: 829 E9/L (ref 130–450)
PMV BLD AUTO: 10.7 FL (ref 7–12)
POTASSIUM REFLEX MAGNESIUM: 4.3 MMOL/L (ref 3.5–5)
PROTEIN UA: 30 MG/DL
RBC # BLD: 4.84 E12/L (ref 3.5–5.5)
RBC UA: ABNORMAL /HPF (ref 0–2)
SODIUM BLD-SCNC: 136 MMOL/L (ref 132–146)
SPECIFIC GRAVITY UA: 1.02 (ref 1–1.03)
TOTAL PROTEIN: 9.1 G/DL (ref 6.4–8.3)
UROBILINOGEN, URINE: 0.2 E.U./DL
WBC # BLD: 19 E9/L (ref 4.5–11.5)
WBC UA: ABNORMAL /HPF (ref 0–5)

## 2019-10-18 PROCEDURE — 6360000002 HC RX W HCPCS: Performed by: GENERAL PRACTICE

## 2019-10-18 PROCEDURE — 99284 EMERGENCY DEPT VISIT MOD MDM: CPT

## 2019-10-18 PROCEDURE — 80048 BASIC METABOLIC PNL TOTAL CA: CPT

## 2019-10-18 PROCEDURE — 80076 HEPATIC FUNCTION PANEL: CPT

## 2019-10-18 PROCEDURE — 83690 ASSAY OF LIPASE: CPT

## 2019-10-18 PROCEDURE — 85025 COMPLETE CBC W/AUTO DIFF WBC: CPT

## 2019-10-18 PROCEDURE — 2580000003 HC RX 258: Performed by: GENERAL PRACTICE

## 2019-10-18 PROCEDURE — 96374 THER/PROPH/DIAG INJ IV PUSH: CPT

## 2019-10-18 PROCEDURE — 74176 CT ABD & PELVIS W/O CONTRAST: CPT

## 2019-10-18 PROCEDURE — 81001 URINALYSIS AUTO W/SCOPE: CPT

## 2019-10-18 PROCEDURE — 36415 COLL VENOUS BLD VENIPUNCTURE: CPT

## 2019-10-18 RX ORDER — CEFDINIR 300 MG/1
300 CAPSULE ORAL 2 TIMES DAILY
Qty: 10 CAPSULE | Refills: 0 | Status: SHIPPED | OUTPATIENT
Start: 2019-10-18 | End: 2019-10-23

## 2019-10-18 RX ORDER — 0.9 % SODIUM CHLORIDE 0.9 %
1000 INTRAVENOUS SOLUTION INTRAVENOUS ONCE
Status: COMPLETED | OUTPATIENT
Start: 2019-10-18 | End: 2019-10-18

## 2019-10-18 RX ORDER — KETOROLAC TROMETHAMINE 30 MG/ML
15 INJECTION, SOLUTION INTRAMUSCULAR; INTRAVENOUS ONCE
Status: COMPLETED | OUTPATIENT
Start: 2019-10-18 | End: 2019-10-18

## 2019-10-18 RX ORDER — ONDANSETRON 2 MG/ML
4 INJECTION INTRAMUSCULAR; INTRAVENOUS EVERY 6 HOURS PRN
Status: DISCONTINUED | OUTPATIENT
Start: 2019-10-18 | End: 2019-10-18 | Stop reason: HOSPADM

## 2019-10-18 RX ORDER — METRONIDAZOLE 500 MG/1
500 TABLET ORAL 3 TIMES DAILY
Qty: 15 TABLET | Refills: 0 | Status: SHIPPED | OUTPATIENT
Start: 2019-10-18 | End: 2019-10-23

## 2019-10-18 RX ADMIN — KETOROLAC TROMETHAMINE 15 MG: 30 INJECTION, SOLUTION INTRAMUSCULAR at 14:29

## 2019-10-18 RX ADMIN — SODIUM CHLORIDE 1000 ML: 9 INJECTION, SOLUTION INTRAVENOUS at 14:29

## 2019-10-18 RX ADMIN — ONDANSETRON 4 MG: 2 INJECTION INTRAMUSCULAR; INTRAVENOUS at 14:29

## 2019-10-18 ASSESSMENT — ENCOUNTER SYMPTOMS
ABDOMINAL PAIN: 1
EYE PAIN: 0
SINUS PAIN: 0
SHORTNESS OF BREATH: 0
CHOKING: 0
CHEST TIGHTNESS: 0
WHEEZING: 0
VOMITING: 0
COUGH: 0
DIARRHEA: 0
SORE THROAT: 0
NAUSEA: 1

## 2019-10-18 ASSESSMENT — PAIN DESCRIPTION - ORIENTATION: ORIENTATION: RIGHT

## 2019-10-18 ASSESSMENT — PAIN SCALES - GENERAL
PAINLEVEL_OUTOF10: 9
PAINLEVEL_OUTOF10: 4
PAINLEVEL_OUTOF10: 9

## 2019-10-18 ASSESSMENT — PAIN DESCRIPTION - PAIN TYPE: TYPE: ACUTE PAIN

## 2019-10-18 ASSESSMENT — PAIN DESCRIPTION - DESCRIPTORS: DESCRIPTORS: THROBBING

## 2019-10-18 ASSESSMENT — PAIN DESCRIPTION - LOCATION: LOCATION: ABDOMEN;FLANK

## 2019-10-18 ASSESSMENT — PAIN DESCRIPTION - FREQUENCY: FREQUENCY: CONTINUOUS

## 2019-12-08 PROBLEM — I26.99 BILATERAL PULMONARY EMBOLISM (HCC): Status: ACTIVE | Noted: 2019-01-01

## 2019-12-08 PROBLEM — I26.99 PE (PULMONARY THROMBOEMBOLISM) (HCC): Status: ACTIVE | Noted: 2019-01-01

## 2020-01-01 ENCOUNTER — HOSPITAL ENCOUNTER (EMERGENCY)
Age: 65
Discharge: HOME | End: 2020-02-14
Attending: EMERGENCY MEDICINE
Payer: COMMERCIAL

## 2020-01-01 ENCOUNTER — APPOINTMENT (OUTPATIENT)
Dept: CT IMAGING | Age: 65
DRG: 840 | End: 2020-01-01
Payer: COMMERCIAL

## 2020-01-01 ENCOUNTER — APPOINTMENT (OUTPATIENT)
Dept: GENERAL RADIOLOGY | Age: 65
DRG: 208 | End: 2020-01-01
Payer: COMMERCIAL

## 2020-01-01 ENCOUNTER — HOSPITAL ENCOUNTER (OUTPATIENT)
Dept: GENERAL RADIOLOGY | Age: 65
Discharge: HOME | End: 2020-11-25
Payer: COMMERCIAL

## 2020-01-01 ENCOUNTER — OFFICE VISIT (OUTPATIENT)
Dept: VASCULAR SURGERY | Age: 65
End: 2020-01-01
Payer: COMMERCIAL

## 2020-01-01 ENCOUNTER — TELEPHONE (OUTPATIENT)
Dept: BREAST CENTER | Age: 65
End: 2020-01-01

## 2020-01-01 ENCOUNTER — HOSPITAL ENCOUNTER (INPATIENT)
Age: 65
LOS: 7 days | Discharge: HOME | DRG: 840 | End: 2020-02-29
Attending: EMERGENCY MEDICINE | Admitting: INTERNAL MEDICINE
Payer: COMMERCIAL

## 2020-01-01 ENCOUNTER — APPOINTMENT (OUTPATIENT)
Dept: ULTRASOUND IMAGING | Age: 65
DRG: 840 | End: 2020-01-01
Payer: COMMERCIAL

## 2020-01-01 ENCOUNTER — APPOINTMENT (OUTPATIENT)
Dept: ULTRASOUND IMAGING | Age: 65
DRG: 177 | End: 2020-01-01
Payer: COMMERCIAL

## 2020-01-01 ENCOUNTER — HOSPITAL ENCOUNTER (OUTPATIENT)
Dept: GENERAL RADIOLOGY | Age: 65
Discharge: HOME | End: 2020-08-20
Payer: COMMERCIAL

## 2020-01-01 ENCOUNTER — HOSPITAL ENCOUNTER (INPATIENT)
Age: 65
LOS: 5 days | Discharge: HOME | DRG: 177 | End: 2020-12-04
Attending: EMERGENCY MEDICINE | Admitting: INTERNAL MEDICINE
Payer: COMMERCIAL

## 2020-01-01 ENCOUNTER — APPOINTMENT (OUTPATIENT)
Dept: CT IMAGING | Age: 65
DRG: 208 | End: 2020-01-01
Payer: COMMERCIAL

## 2020-01-01 ENCOUNTER — HOSPITAL ENCOUNTER (OUTPATIENT)
Dept: PET IMAGING | Age: 65
Discharge: HOME | End: 2020-04-04
Payer: COMMERCIAL

## 2020-01-01 ENCOUNTER — HOSPITAL ENCOUNTER (OUTPATIENT)
Age: 65
Discharge: HOME | End: 2020-08-20
Payer: COMMERCIAL

## 2020-01-01 ENCOUNTER — OFFICE VISIT (OUTPATIENT)
Dept: ENT CLINIC | Age: 65
End: 2020-01-01
Payer: COMMERCIAL

## 2020-01-01 ENCOUNTER — TELEPHONE (OUTPATIENT)
Dept: ENT CLINIC | Age: 65
End: 2020-01-01

## 2020-01-01 ENCOUNTER — HOSPITAL ENCOUNTER (INPATIENT)
Age: 65
LOS: 1 days | DRG: 208 | End: 2020-12-06
Attending: EMERGENCY MEDICINE | Admitting: INTERNAL MEDICINE
Payer: COMMERCIAL

## 2020-01-01 ENCOUNTER — APPOINTMENT (OUTPATIENT)
Dept: CT IMAGING | Age: 65
DRG: 177 | End: 2020-01-01
Payer: COMMERCIAL

## 2020-01-01 ENCOUNTER — HOSPITAL ENCOUNTER (OUTPATIENT)
Dept: GENERAL RADIOLOGY | Age: 65
Discharge: HOME | End: 2020-05-16
Payer: COMMERCIAL

## 2020-01-01 ENCOUNTER — APPOINTMENT (OUTPATIENT)
Dept: GENERAL RADIOLOGY | Age: 65
DRG: 177 | End: 2020-01-01
Payer: COMMERCIAL

## 2020-01-01 ENCOUNTER — TELEPHONE (OUTPATIENT)
Dept: VASCULAR SURGERY | Age: 65
End: 2020-01-01

## 2020-01-01 ENCOUNTER — HOSPITAL ENCOUNTER (OUTPATIENT)
Dept: CT IMAGING | Age: 65
Discharge: HOME | End: 2020-10-08
Payer: COMMERCIAL

## 2020-01-01 ENCOUNTER — OFFICE VISIT (OUTPATIENT)
Dept: VASCULAR SURGERY | Age: 65
End: 2020-01-01

## 2020-01-01 VITALS
HEART RATE: 105 BPM | DIASTOLIC BLOOD PRESSURE: 100 MMHG | SYSTOLIC BLOOD PRESSURE: 156 MMHG | WEIGHT: 177.2 LBS | BODY MASS INDEX: 29.49 KG/M2

## 2020-01-01 VITALS
WEIGHT: 209.88 LBS | RESPIRATION RATE: 22 BRPM | TEMPERATURE: 99.3 F | DIASTOLIC BLOOD PRESSURE: 53 MMHG | HEART RATE: 98 BPM | SYSTOLIC BLOOD PRESSURE: 84 MMHG | HEIGHT: 65 IN | OXYGEN SATURATION: 98 % | BODY MASS INDEX: 34.97 KG/M2

## 2020-01-01 VITALS
HEIGHT: 65 IN | DIASTOLIC BLOOD PRESSURE: 88 MMHG | BODY MASS INDEX: 29.66 KG/M2 | WEIGHT: 178 LBS | SYSTOLIC BLOOD PRESSURE: 140 MMHG

## 2020-01-01 VITALS
DIASTOLIC BLOOD PRESSURE: 76 MMHG | HEIGHT: 65 IN | HEART RATE: 84 BPM | SYSTOLIC BLOOD PRESSURE: 133 MMHG | TEMPERATURE: 98.5 F | OXYGEN SATURATION: 95 % | WEIGHT: 179.4 LBS | BODY MASS INDEX: 29.89 KG/M2 | RESPIRATION RATE: 16 BRPM

## 2020-01-01 VITALS
TEMPERATURE: 97.2 F | BODY MASS INDEX: 31.32 KG/M2 | RESPIRATION RATE: 18 BRPM | SYSTOLIC BLOOD PRESSURE: 142 MMHG | HEART RATE: 99 BPM | HEIGHT: 65 IN | OXYGEN SATURATION: 93 % | DIASTOLIC BLOOD PRESSURE: 76 MMHG | WEIGHT: 188 LBS

## 2020-01-01 VITALS
SYSTOLIC BLOOD PRESSURE: 159 MMHG | WEIGHT: 175 LBS | TEMPERATURE: 98.2 F | HEART RATE: 94 BPM | DIASTOLIC BLOOD PRESSURE: 79 MMHG | BODY MASS INDEX: 29.16 KG/M2 | RESPIRATION RATE: 14 BRPM | HEIGHT: 65 IN | OXYGEN SATURATION: 96 %

## 2020-01-01 VITALS
SYSTOLIC BLOOD PRESSURE: 124 MMHG | DIASTOLIC BLOOD PRESSURE: 80 MMHG | WEIGHT: 190 LBS | BODY MASS INDEX: 31.65 KG/M2 | HEIGHT: 65 IN | RESPIRATION RATE: 16 BRPM

## 2020-01-01 VITALS
BODY MASS INDEX: 29.32 KG/M2 | DIASTOLIC BLOOD PRESSURE: 70 MMHG | SYSTOLIC BLOOD PRESSURE: 140 MMHG | HEIGHT: 65 IN | WEIGHT: 176 LBS | RESPIRATION RATE: 16 BRPM

## 2020-01-01 LAB
AADO2: 512.5 MMHG
AADO2: 521.3 MMHG
AADO2: 571.1 MMHG
AADO2: 575.2 MMHG
ABO/RH: NORMAL
ACANTHOCYTES: ABNORMAL
ADENOVIRUS BY PCR: NOT DETECTED
ADENOVIRUS BY PCR: NOT DETECTED
ALBUMIN SERPL-MCNC: 1.6 G/DL (ref 3.5–5.2)
ALBUMIN SERPL-MCNC: 2.4 G/DL (ref 3.5–5.2)
ALBUMIN SERPL-MCNC: 2.5 G/DL (ref 3.5–5.2)
ALBUMIN SERPL-MCNC: 2.6 G/DL (ref 3.5–5.2)
ALBUMIN SERPL-MCNC: 2.7 G/DL (ref 3.5–5.2)
ALBUMIN SERPL-MCNC: 3 G/DL (ref 3.5–5.2)
ALBUMIN SERPL-MCNC: 3.5 G/DL (ref 3.5–5.2)
ALBUMIN SERPL-MCNC: 3.5 G/DL (ref 3.5–5.2)
ALP BLD-CCNC: 101 U/L (ref 35–104)
ALP BLD-CCNC: 105 U/L (ref 35–104)
ALP BLD-CCNC: 116 U/L (ref 35–104)
ALP BLD-CCNC: 162 U/L (ref 35–104)
ALP BLD-CCNC: 67 U/L (ref 35–104)
ALP BLD-CCNC: 68 U/L (ref 35–104)
ALP BLD-CCNC: 72 U/L (ref 35–104)
ALP BLD-CCNC: 88 U/L (ref 35–104)
ALP BLD-CCNC: 91 U/L (ref 35–104)
ALP BLD-CCNC: 97 U/L (ref 35–104)
ALT SERPL-CCNC: 13 U/L (ref 0–32)
ALT SERPL-CCNC: 13 U/L (ref 0–32)
ALT SERPL-CCNC: 15 U/L (ref 0–32)
ALT SERPL-CCNC: 16 U/L (ref 0–32)
ALT SERPL-CCNC: 18 U/L (ref 0–32)
ALT SERPL-CCNC: 53 U/L (ref 0–32)
ALT SERPL-CCNC: <5 U/L (ref 0–32)
ALT SERPL-CCNC: <5 U/L (ref 0–32)
AMORPHOUS: ABNORMAL
ANGLE (CLOT STRENGTH): 64 DEGREE (ref 59–74)
ANION GAP SERPL CALCULATED.3IONS-SCNC: 13 MMOL/L (ref 7–16)
ANION GAP SERPL CALCULATED.3IONS-SCNC: 13 MMOL/L (ref 7–16)
ANION GAP SERPL CALCULATED.3IONS-SCNC: 15 MMOL/L (ref 7–16)
ANION GAP SERPL CALCULATED.3IONS-SCNC: 16 MMOL/L (ref 7–16)
ANION GAP SERPL CALCULATED.3IONS-SCNC: 18 MMOL/L (ref 7–16)
ANION GAP SERPL CALCULATED.3IONS-SCNC: 18 MMOL/L (ref 7–16)
ANION GAP SERPL CALCULATED.3IONS-SCNC: 19 MMOL/L (ref 7–16)
ANION GAP SERPL CALCULATED.3IONS-SCNC: 21 MMOL/L (ref 7–16)
ANION GAP SERPL CALCULATED.3IONS-SCNC: 26 MMOL/L (ref 7–16)
ANION GAP SERPL CALCULATED.3IONS-SCNC: 32 MMOL/L (ref 7–16)
ANION GAP SERPL CALCULATED.3IONS-SCNC: 33 MMOL/L (ref 7–16)
ANION GAP SERPL CALCULATED.3IONS-SCNC: 33 MMOL/L (ref 7–16)
ANION GAP SERPL CALCULATED.3IONS-SCNC: 35 MMOL/L (ref 7–16)
ANION GAP SERPL CALCULATED.3IONS-SCNC: 9 MMOL/L (ref 7–16)
ANISOCYTOSIS: ABNORMAL
ANTI-NUCLEAR ANTIBODY (ANA): NEGATIVE
ANTIBODY SCREEN: NORMAL
APTT: 117.3 SEC (ref 24.5–35.1)
APTT: 27.2 SEC (ref 24.5–35.1)
APTT: 30.9 SEC (ref 24.5–35.1)
APTT: 56.9 SEC (ref 24.5–35.1)
APTT: 60.5 SEC (ref 24.5–35.1)
APTT: 73.7 SEC (ref 24.5–35.1)
APTT: 86 SEC (ref 24.5–35.1)
AST SERPL-CCNC: 165 U/L (ref 0–31)
AST SERPL-CCNC: 17 U/L (ref 0–31)
AST SERPL-CCNC: 17 U/L (ref 0–31)
AST SERPL-CCNC: 64 U/L (ref 0–31)
AST SERPL-CCNC: 68 U/L (ref 0–31)
AST SERPL-CCNC: 69 U/L (ref 0–31)
AST SERPL-CCNC: 75 U/L (ref 0–31)
AST SERPL-CCNC: 76 U/L (ref 0–31)
AST SERPL-CCNC: 77 U/L (ref 0–31)
AST SERPL-CCNC: 82 U/L (ref 0–31)
ATYPICAL LYMPHOCYTE RELATIVE PERCENT: 0.9 % (ref 0–4)
ATYPICAL LYMPHOCYTE RELATIVE PERCENT: 0.9 % (ref 0–4)
ATYPICAL LYMPHOCYTE RELATIVE PERCENT: 1 % (ref 0–4)
ATYPICAL LYMPHOCYTE RELATIVE PERCENT: 2 % (ref 0–4)
B.E.: -13.1 MMOL/L (ref -3–3)
B.E.: -15.2 MMOL/L (ref -3–3)
B.E.: -15.8 MMOL/L (ref -3–3)
B.E.: -8.9 MMOL/L (ref -3–3)
BACTERIA: ABNORMAL /HPF
BACTERIA: NORMAL /HPF
BASOPHILS ABSOLUTE: 0 E9/L (ref 0–0.2)
BASOPHILS ABSOLUTE: 0.04 E9/L (ref 0–0.2)
BASOPHILS ABSOLUTE: 0.22 E9/L (ref 0–0.2)
BASOPHILS ABSOLUTE: 0.23 E9/L (ref 0–0.2)
BASOPHILS ABSOLUTE: 0.25 E9/L (ref 0–0.2)
BASOPHILS ABSOLUTE: 0.26 E9/L (ref 0–0.2)
BASOPHILS ABSOLUTE: 0.38 E9/L (ref 0–0.2)
BASOPHILS ABSOLUTE: 0.39 E9/L (ref 0–0.2)
BASOPHILS ABSOLUTE: 0.62 E9/L (ref 0–0.2)
BASOPHILS RELATIVE PERCENT: 0 % (ref 0–2)
BASOPHILS RELATIVE PERCENT: 0.7 % (ref 0–2)
BASOPHILS RELATIVE PERCENT: 0.8 % (ref 0–2)
BASOPHILS RELATIVE PERCENT: 0.8 % (ref 0–2)
BASOPHILS RELATIVE PERCENT: 0.9 % (ref 0–2)
BASOPHILS RELATIVE PERCENT: 1 % (ref 0–2)
BASOPHILS RELATIVE PERCENT: 2 % (ref 0–2)
BASOPHILS RELATIVE PERCENT: 2 % (ref 0–2)
BASOPHILS RELATIVE PERCENT: 2.7 % (ref 0–2)
BILIRUB SERPL-MCNC: 0.2 MG/DL (ref 0–1.2)
BILIRUB SERPL-MCNC: 0.3 MG/DL (ref 0–1.2)
BILIRUB SERPL-MCNC: 0.4 MG/DL (ref 0–1.2)
BILIRUB SERPL-MCNC: <0.2 MG/DL (ref 0–1.2)
BILIRUBIN URINE: ABNORMAL
BILIRUBIN URINE: NEGATIVE
BLASTS RELATIVE PERCENT: 0.9 % (ref 0–0)
BLASTS RELATIVE PERCENT: 3 % (ref 0–0)
BLASTS RELATIVE PERCENT: 4 % (ref 0–0)
BLOOD BANK DISPENSE STATUS: NORMAL
BLOOD BANK PRODUCT CODE: NORMAL
BLOOD CULTURE, ROUTINE: NORMAL
BLOOD, URINE: ABNORMAL
BLOOD, URINE: NORMAL
BORDETELLA PARAPERTUSSIS BY PCR: NOT DETECTED
BORDETELLA PARAPERTUSSIS BY PCR: NOT DETECTED
BORDETELLA PERTUSSIS BY PCR: NOT DETECTED
BORDETELLA PERTUSSIS BY PCR: NOT DETECTED
BPU ID: NORMAL
BUN BLDV-MCNC: 14 MG/DL (ref 8–23)
BUN BLDV-MCNC: 14 MG/DL (ref 8–23)
BUN BLDV-MCNC: 15 MG/DL (ref 8–23)
BUN BLDV-MCNC: 16 MG/DL (ref 8–23)
BUN BLDV-MCNC: 25 MG/DL (ref 8–23)
BUN BLDV-MCNC: 29 MG/DL (ref 8–23)
BUN BLDV-MCNC: 32 MG/DL (ref 8–23)
BUN BLDV-MCNC: 33 MG/DL (ref 8–23)
BUN BLDV-MCNC: 35 MG/DL (ref 8–23)
BUN BLDV-MCNC: 37 MG/DL (ref 8–23)
BUN BLDV-MCNC: 48 MG/DL (ref 8–23)
BUN BLDV-MCNC: 63 MG/DL (ref 8–23)
BUN BLDV-MCNC: 73 MG/DL (ref 8–23)
BUN BLDV-MCNC: 74 MG/DL (ref 8–23)
BUN BLDV-MCNC: 75 MG/DL (ref 8–23)
BUN BLDV-MCNC: 75 MG/DL (ref 8–23)
BUN BLDV-MCNC: 78 MG/DL (ref 8–23)
BURR CELLS: ABNORMAL
C-REACTIVE PROTEIN: 15.2 MG/DL (ref 0–0.4)
C-REACTIVE PROTEIN: 18.7 MG/DL (ref 0–0.4)
C-REACTIVE PROTEIN: 2.9 MG/DL (ref 0–0.4)
C-REACTIVE PROTEIN: 21.8 MG/DL (ref 0–0.4)
C-REACTIVE PROTEIN: 4.5 MG/DL (ref 0–0.4)
C-REACTIVE PROTEIN: 7.2 MG/DL (ref 0–0.4)
CALCIUM IONIZED: 0.69 MMOL/L (ref 1.15–1.33)
CALCIUM IONIZED: 0.76 MMOL/L (ref 1.15–1.33)
CALCIUM IONIZED: 0.95 MMOL/L (ref 1.15–1.33)
CALCIUM IONIZED: 0.98 MMOL/L (ref 1.15–1.33)
CALCIUM SERPL-MCNC: 5.8 MG/DL (ref 8.6–10.2)
CALCIUM SERPL-MCNC: 6.3 MG/DL (ref 8.6–10.2)
CALCIUM SERPL-MCNC: 6.9 MG/DL (ref 8.6–10.2)
CALCIUM SERPL-MCNC: 7 MG/DL (ref 8.6–10.2)
CALCIUM SERPL-MCNC: 7.3 MG/DL (ref 8.6–10.2)
CALCIUM SERPL-MCNC: 7.4 MG/DL (ref 8.6–10.2)
CALCIUM SERPL-MCNC: 7.8 MG/DL (ref 8.6–10.2)
CALCIUM SERPL-MCNC: 7.9 MG/DL (ref 8.6–10.2)
CALCIUM SERPL-MCNC: 8 MG/DL (ref 8.6–10.2)
CALCIUM SERPL-MCNC: 8.4 MG/DL (ref 8.6–10.2)
CALCIUM SERPL-MCNC: 8.9 MG/DL (ref 8.6–10.2)
CALCIUM SERPL-MCNC: 9.1 MG/DL (ref 8.6–10.2)
CALCIUM SERPL-MCNC: 9.3 MG/DL (ref 8.6–10.2)
CHLAMYDOPHILIA PNEUMONIAE BY PCR: NOT DETECTED
CHLAMYDOPHILIA PNEUMONIAE BY PCR: NOT DETECTED
CHLORIDE BLD-SCNC: 100 MMOL/L (ref 98–107)
CHLORIDE BLD-SCNC: 101 MMOL/L (ref 98–107)
CHLORIDE BLD-SCNC: 103 MMOL/L (ref 98–107)
CHLORIDE BLD-SCNC: 103 MMOL/L (ref 98–107)
CHLORIDE BLD-SCNC: 83 MMOL/L (ref 98–107)
CHLORIDE BLD-SCNC: 88 MMOL/L (ref 98–107)
CHLORIDE BLD-SCNC: 93 MMOL/L (ref 98–107)
CHLORIDE BLD-SCNC: 94 MMOL/L (ref 98–107)
CHLORIDE BLD-SCNC: 95 MMOL/L (ref 98–107)
CHLORIDE BLD-SCNC: 96 MMOL/L (ref 98–107)
CHLORIDE BLD-SCNC: 96 MMOL/L (ref 98–107)
CHLORIDE BLD-SCNC: 97 MMOL/L (ref 98–107)
CHLORIDE BLD-SCNC: 98 MMOL/L (ref 98–107)
CHLORIDE BLD-SCNC: 99 MMOL/L (ref 98–107)
CHLORIDE BLD-SCNC: 99 MMOL/L (ref 98–107)
CHLORIDE URINE RANDOM: 30 MMOL/L
CLARITY: ABNORMAL
CLARITY: CLEAR
CO2: 12 MMOL/L (ref 22–29)
CO2: 13 MMOL/L (ref 22–29)
CO2: 14 MMOL/L (ref 22–29)
CO2: 15 MMOL/L (ref 22–29)
CO2: 15 MMOL/L (ref 22–29)
CO2: 18 MMOL/L (ref 22–29)
CO2: 19 MMOL/L (ref 22–29)
CO2: 20 MMOL/L (ref 22–29)
CO2: 22 MMOL/L (ref 22–29)
CO2: 23 MMOL/L (ref 22–29)
CO2: 23 MMOL/L (ref 22–29)
CO2: 24 MMOL/L (ref 22–29)
CO2: 25 MMOL/L (ref 22–29)
CO2: 28 MMOL/L (ref 22–29)
CO2: 32 MMOL/L (ref 22–29)
COHB: 0.3 % (ref 0–1.5)
COHB: 0.4 % (ref 0–1.5)
COHB: 0.5 % (ref 0–1.5)
COHB: 0.6 % (ref 0–1.5)
COLOR: YELLOW
COMMENT: ABNORMAL
CORONAVIRUS 229E BY PCR: NOT DETECTED
CORONAVIRUS 229E BY PCR: NOT DETECTED
CORONAVIRUS HKU1 BY PCR: NOT DETECTED
CORONAVIRUS HKU1 BY PCR: NOT DETECTED
CORONAVIRUS NL63 BY PCR: NOT DETECTED
CORONAVIRUS NL63 BY PCR: NOT DETECTED
CORONAVIRUS OC43 BY PCR: NOT DETECTED
CORONAVIRUS OC43 BY PCR: NOT DETECTED
CREAT SERPL-MCNC: 1.3 MG/DL (ref 0.5–1)
CREAT SERPL-MCNC: 1.4 MG/DL (ref 0.5–1)
CREAT SERPL-MCNC: 1.6 MG/DL (ref 0.5–1)
CREAT SERPL-MCNC: 1.7 MG/DL (ref 0.5–1)
CREAT SERPL-MCNC: 1.9 MG/DL (ref 0.5–1)
CREAT SERPL-MCNC: 2 MG/DL (ref 0.5–1)
CREAT SERPL-MCNC: 2.5 MG/DL (ref 0.5–1)
CREAT SERPL-MCNC: 2.8 MG/DL (ref 0.5–1)
CREAT SERPL-MCNC: 2.9 MG/DL (ref 0.5–1)
CREAT SERPL-MCNC: 2.9 MG/DL (ref 0.5–1)
CREAT SERPL-MCNC: 3.1 MG/DL (ref 0.5–1)
CREAT SERPL-MCNC: 3.4 MG/DL (ref 0.5–1)
CREAT SERPL-MCNC: 3.5 MG/DL (ref 0.5–1)
CREAT SERPL-MCNC: 3.7 MG/DL (ref 0.5–1)
CREATININE URINE: 60 MG/DL (ref 29–226)
CREATININE URINE: 69 MG/DL (ref 29–226)
CRITICAL: ABNORMAL
CULTURE, BLOOD 2: NORMAL
D DIMER: 650 NG/ML DDU
D DIMER: 667 NG/ML DDU
D DIMER: 708 NG/ML DDU
DATE ANALYZED: ABNORMAL
DATE OF COLLECTION: ABNORMAL
DESCRIPTION BLOOD BANK: NORMAL
EKG ATRIAL RATE: 118 BPM
EKG ATRIAL RATE: 122 BPM
EKG ATRIAL RATE: 146 BPM
EKG ATRIAL RATE: 86 BPM
EKG ATRIAL RATE: 87 BPM
EKG P AXIS: 44 DEGREES
EKG P AXIS: 56 DEGREES
EKG P AXIS: 57 DEGREES
EKG P AXIS: 59 DEGREES
EKG P AXIS: 68 DEGREES
EKG P-R INTERVAL: 114 MS
EKG P-R INTERVAL: 122 MS
EKG P-R INTERVAL: 122 MS
EKG P-R INTERVAL: 144 MS
EKG P-R INTERVAL: 146 MS
EKG Q-T INTERVAL: 310 MS
EKG Q-T INTERVAL: 312 MS
EKG Q-T INTERVAL: 338 MS
EKG Q-T INTERVAL: 362 MS
EKG Q-T INTERVAL: 396 MS
EKG QRS DURATION: 100 MS
EKG QRS DURATION: 126 MS
EKG QRS DURATION: 86 MS
EKG QRS DURATION: 86 MS
EKG QRS DURATION: 90 MS
EKG QTC CALCULATION (BAZETT): 433 MS
EKG QTC CALCULATION (BAZETT): 437 MS
EKG QTC CALCULATION (BAZETT): 441 MS
EKG QTC CALCULATION (BAZETT): 476 MS
EKG QTC CALCULATION (BAZETT): 526 MS
EKG R AXIS: -14 DEGREES
EKG R AXIS: -23 DEGREES
EKG R AXIS: 21 DEGREES
EKG R AXIS: 3 DEGREES
EKG R AXIS: 9 DEGREES
EKG T AXIS: -16 DEGREES
EKG T AXIS: 33 DEGREES
EKG T AXIS: 40 DEGREES
EKG T AXIS: 48 DEGREES
EKG T AXIS: 48 DEGREES
EKG VENTRICULAR RATE: 118 BPM
EKG VENTRICULAR RATE: 122 BPM
EKG VENTRICULAR RATE: 146 BPM
EKG VENTRICULAR RATE: 86 BPM
EKG VENTRICULAR RATE: 87 BPM
EOSINOPHILS ABSOLUTE: 0 E9/L (ref 0.05–0.5)
EOSINOPHILS ABSOLUTE: 0.13 E9/L (ref 0.05–0.5)
EOSINOPHILS RELATIVE PERCENT: 0 % (ref 0–6)
EOSINOPHILS RELATIVE PERCENT: 0.1 % (ref 0–6)
EOSINOPHILS RELATIVE PERCENT: 0.4 % (ref 0–6)
EOSINOPHILS RELATIVE PERCENT: 2 % (ref 0–6)
EPL-TEG: 0 % (ref 0–15)
FERRITIN: 746 NG/ML
FIBRINOGEN: 226 MG/DL (ref 225–540)
FIBRINOGEN: 552 MG/DL (ref 225–540)
FIBRINOGEN: 603 MG/DL (ref 225–540)
FIO2: 100 %
G-TEG: 13.5 K D/SC (ref 4.5–11)
GFR AFRICAN AMERICAN: 15
GFR AFRICAN AMERICAN: 16
GFR AFRICAN AMERICAN: 16
GFR AFRICAN AMERICAN: 18
GFR AFRICAN AMERICAN: 20
GFR AFRICAN AMERICAN: 23
GFR AFRICAN AMERICAN: 30
GFR AFRICAN AMERICAN: 32
GFR AFRICAN AMERICAN: 32
GFR AFRICAN AMERICAN: 36
GFR AFRICAN AMERICAN: 39
GFR AFRICAN AMERICAN: 45
GFR AFRICAN AMERICAN: 50
GFR NON-AFRICAN AMERICAN: 12 ML/MIN/1.73
GFR NON-AFRICAN AMERICAN: 13 ML/MIN/1.73
GFR NON-AFRICAN AMERICAN: 13 ML/MIN/1.73
GFR NON-AFRICAN AMERICAN: 15 ML/MIN/1.73
GFR NON-AFRICAN AMERICAN: 16 ML/MIN/1.73
GFR NON-AFRICAN AMERICAN: 16 ML/MIN/1.73
GFR NON-AFRICAN AMERICAN: 17 ML/MIN/1.73
GFR NON-AFRICAN AMERICAN: 19 ML/MIN/1.73
GFR NON-AFRICAN AMERICAN: 25 ML/MIN/1.73
GFR NON-AFRICAN AMERICAN: 26 ML/MIN/1.73
GFR NON-AFRICAN AMERICAN: 26 ML/MIN/1.73
GFR NON-AFRICAN AMERICAN: 30 ML/MIN/1.73
GFR NON-AFRICAN AMERICAN: 32 ML/MIN/1.73
GFR NON-AFRICAN AMERICAN: 37 ML/MIN/1.73
GFR NON-AFRICAN AMERICAN: 38 ML/MIN/1.73
GFR NON-AFRICAN AMERICAN: 41 ML/MIN/1.73
GLUCOSE BLD-MCNC: 101 MG/DL (ref 74–99)
GLUCOSE BLD-MCNC: 105 MG/DL (ref 74–99)
GLUCOSE BLD-MCNC: 107 MG/DL (ref 74–99)
GLUCOSE BLD-MCNC: 120 MG/DL (ref 74–99)
GLUCOSE BLD-MCNC: 131 MG/DL (ref 74–99)
GLUCOSE BLD-MCNC: 138 MG/DL (ref 74–99)
GLUCOSE BLD-MCNC: 138 MG/DL (ref 74–99)
GLUCOSE BLD-MCNC: 140 MG/DL (ref 74–99)
GLUCOSE BLD-MCNC: 147 MG/DL (ref 74–99)
GLUCOSE BLD-MCNC: 154 MG/DL (ref 74–99)
GLUCOSE BLD-MCNC: 217 MG/DL (ref 74–99)
GLUCOSE BLD-MCNC: 249 MG/DL (ref 74–99)
GLUCOSE BLD-MCNC: 278 MG/DL (ref 74–99)
GLUCOSE BLD-MCNC: 59 MG/DL (ref 74–99)
GLUCOSE BLD-MCNC: 78 MG/DL (ref 74–99)
GLUCOSE BLD-MCNC: 83 MG/DL (ref 74–99)
GLUCOSE BLD-MCNC: 86 MG/DL (ref 74–99)
GLUCOSE BLD-MCNC: 96 MG/DL (ref 74–99)
GLUCOSE BLD-MCNC: 98 MG/DL (ref 74–99)
GLUCOSE URINE: NEGATIVE MG/DL
HBA1C MFR BLD: 5.8 % (ref 4–5.6)
HCO3: 13.7 MMOL/L (ref 22–26)
HCO3: 13.7 MMOL/L (ref 22–26)
HCO3: 17.9 MMOL/L (ref 22–26)
HCO3: 19.1 MMOL/L (ref 22–26)
HCT VFR BLD CALC: 26 % (ref 34–48)
HCT VFR BLD CALC: 27.9 % (ref 34–48)
HCT VFR BLD CALC: 28.1 % (ref 34–48)
HCT VFR BLD CALC: 29.3 % (ref 34–48)
HCT VFR BLD CALC: 29.7 % (ref 34–48)
HCT VFR BLD CALC: 30.4 % (ref 34–48)
HCT VFR BLD CALC: 31.2 % (ref 34–48)
HCT VFR BLD CALC: 31.9 % (ref 34–48)
HCT VFR BLD CALC: 32.4 % (ref 34–48)
HCT VFR BLD CALC: 32.4 % (ref 34–48)
HCT VFR BLD CALC: 33 % (ref 34–48)
HCT VFR BLD CALC: 33 % (ref 34–48)
HCT VFR BLD CALC: 34.6 % (ref 34–48)
HCT VFR BLD CALC: 35.5 % (ref 34–48)
HCT VFR BLD CALC: 35.8 % (ref 34–48)
HCT VFR BLD CALC: 37.1 % (ref 34–48)
HCT VFR BLD CALC: 37.1 % (ref 34–48)
HCT VFR BLD CALC: 37.7 % (ref 34–48)
HCT VFR BLD CALC: 38.5 % (ref 34–48)
HCT VFR BLD CALC: 39 % (ref 34–48)
HCT VFR BLD CALC: 41.2 % (ref 34–48)
HEMOGLOBIN: 10.3 G/DL (ref 11.5–15.5)
HEMOGLOBIN: 10.4 G/DL (ref 11.5–15.5)
HEMOGLOBIN: 10.7 G/DL (ref 11.5–15.5)
HEMOGLOBIN: 11.1 G/DL (ref 11.5–15.5)
HEMOGLOBIN: 11.2 G/DL (ref 11.5–15.5)
HEMOGLOBIN: 11.3 G/DL (ref 11.5–15.5)
HEMOGLOBIN: 11.4 G/DL (ref 11.5–15.5)
HEMOGLOBIN: 11.4 G/DL (ref 11.5–15.5)
HEMOGLOBIN: 12.3 G/DL (ref 11.5–15.5)
HEMOGLOBIN: 6.9 G/DL (ref 11.5–15.5)
HEMOGLOBIN: 8 G/DL (ref 11.5–15.5)
HEMOGLOBIN: 8.1 G/DL (ref 11.5–15.5)
HEMOGLOBIN: 8.6 G/DL (ref 11.5–15.5)
HEMOGLOBIN: 8.6 G/DL (ref 11.5–15.5)
HEMOGLOBIN: 8.7 G/DL (ref 11.5–15.5)
HEMOGLOBIN: 8.8 G/DL (ref 11.5–15.5)
HEMOGLOBIN: 8.9 G/DL (ref 11.5–15.5)
HEMOGLOBIN: 9.7 G/DL (ref 11.5–15.5)
HEMOGLOBIN: 9.8 G/DL (ref 11.5–15.5)
HHB: 11.1 % (ref 0–5)
HHB: 11.8 % (ref 0–5)
HHB: 12.4 % (ref 0–5)
HHB: 2.4 % (ref 0–5)
HUMAN METAPNEUMOVIRUS BY PCR: NOT DETECTED
HUMAN METAPNEUMOVIRUS BY PCR: NOT DETECTED
HUMAN RHINOVIRUS/ENTEROVIRUS BY PCR: NOT DETECTED
HUMAN RHINOVIRUS/ENTEROVIRUS BY PCR: NOT DETECTED
HYPERSEGMENTED NEUTROPHILS: ABNORMAL
HYPOCHROMIA: ABNORMAL
IMMATURE GRANULOCYTES #: 0.32 E9/L
IMMATURE GRANULOCYTES %: 6 % (ref 0–5)
INFLUENZA A BY PCR: NOT DETECTED
INFLUENZA A BY PCR: NOT DETECTED
INFLUENZA B BY PCR: NOT DETECTED
INFLUENZA B BY PCR: NOT DETECTED
INR BLD: 1.6
INR BLD: 1.9
INR BLD: 2.1
INR BLD: 2.1
INR BLD: 2.3
INR BLD: 2.4
INR BLD: 2.7
INR BLD: 2.8
INR BLD: 3.5
INR BLD: 4.2
INR BLD: 4.5
INR BLD: 6
K (CLOTTING TIME): 2 MIN (ref 1–3)
KETONES, URINE: ABNORMAL MG/DL
KETONES, URINE: NEGATIVE MG/DL
L. PNEUMOPHILA SEROGP 1 UR AG: NORMAL
L. PNEUMOPHILA SEROGP 1 UR AG: NORMAL
LAB: ABNORMAL
LACTATE DEHYDROGENASE: 1085 U/L (ref 135–214)
LACTATE DEHYDROGENASE: 997 U/L (ref 135–214)
LACTATE DEHYDROGENASE: >2500 U/L (ref 135–214)
LACTIC ACID, SEPSIS: 1.4 MMOL/L (ref 0.5–1.9)
LACTIC ACID, SEPSIS: 12.9 MMOL/L (ref 0.5–1.9)
LACTIC ACID, SEPSIS: 18 MMOL/L (ref 0.5–1.9)
LACTIC ACID: 11.6 MMOL/L (ref 0.5–2.2)
LACTIC ACID: 17 MMOL/L (ref 0.5–2.2)
LACTIC ACID: 18.7 MMOL/L (ref 0.5–2.2)
LACTIC ACID: 18.9 MMOL/L (ref 0.5–2.2)
LEUKOCYTE ESTERASE, URINE: NEGATIVE
LY30 (FIBRINOLYSIS): 0 % (ref 0–8)
LYMPHOCYTES ABSOLUTE: 0.25 E9/L (ref 1.5–4)
LYMPHOCYTES ABSOLUTE: 0.3 E9/L (ref 1.5–4)
LYMPHOCYTES ABSOLUTE: 0.36 E9/L (ref 1.5–4)
LYMPHOCYTES ABSOLUTE: 0.41 E9/L (ref 1.5–4)
LYMPHOCYTES ABSOLUTE: 0.63 E9/L (ref 1.5–4)
LYMPHOCYTES ABSOLUTE: 0.82 E9/L (ref 1.5–4)
LYMPHOCYTES ABSOLUTE: 1.14 E9/L (ref 1.5–4)
LYMPHOCYTES ABSOLUTE: 1.48 E9/L (ref 1.5–4)
LYMPHOCYTES ABSOLUTE: 1.5 E9/L (ref 1.5–4)
LYMPHOCYTES ABSOLUTE: 1.52 E9/L (ref 1.5–4)
LYMPHOCYTES ABSOLUTE: 1.52 E9/L (ref 1.5–4)
LYMPHOCYTES ABSOLUTE: 1.76 E9/L (ref 1.5–4)
LYMPHOCYTES ABSOLUTE: 2.05 E9/L (ref 1.5–4)
LYMPHOCYTES ABSOLUTE: 2.05 E9/L (ref 1.5–4)
LYMPHOCYTES ABSOLUTE: 3.58 E9/L (ref 1.5–4)
LYMPHOCYTES ABSOLUTE: 4.03 E9/L (ref 1.5–4)
LYMPHOCYTES ABSOLUTE: 5.37 E9/L (ref 1.5–4)
LYMPHOCYTES ABSOLUTE: 7.56 E9/L (ref 1.5–4)
LYMPHOCYTES RELATIVE PERCENT: 1 % (ref 20–42)
LYMPHOCYTES RELATIVE PERCENT: 10.9 % (ref 20–42)
LYMPHOCYTES RELATIVE PERCENT: 11 % (ref 20–42)
LYMPHOCYTES RELATIVE PERCENT: 14.9 % (ref 20–42)
LYMPHOCYTES RELATIVE PERCENT: 2.6 % (ref 20–42)
LYMPHOCYTES RELATIVE PERCENT: 23.4 % (ref 20–42)
LYMPHOCYTES RELATIVE PERCENT: 3 % (ref 20–42)
LYMPHOCYTES RELATIVE PERCENT: 3 % (ref 20–42)
LYMPHOCYTES RELATIVE PERCENT: 4 % (ref 20–42)
LYMPHOCYTES RELATIVE PERCENT: 4.3 % (ref 20–42)
LYMPHOCYTES RELATIVE PERCENT: 6 % (ref 20–42)
LYMPHOCYTES RELATIVE PERCENT: 6.1 % (ref 20–42)
LYMPHOCYTES RELATIVE PERCENT: 6.8 % (ref 20–42)
LYMPHOCYTES RELATIVE PERCENT: 7 % (ref 20–42)
LYMPHOCYTES RELATIVE PERCENT: 7 % (ref 20–42)
LYMPHOCYTES RELATIVE PERCENT: 8.7 % (ref 20–42)
LYMPHOCYTES RELATIVE PERCENT: 8.8 % (ref 20–42)
LYMPHOCYTES RELATIVE PERCENT: 9 % (ref 20–42)
Lab: ABNORMAL
MA (MAX AMPLITUDE): 73 MM (ref 50–70)
MAGNESIUM: 1.3 MG/DL (ref 1.6–2.6)
MAGNESIUM: 1.4 MG/DL (ref 1.6–2.6)
MAGNESIUM: 1.8 MG/DL (ref 1.6–2.6)
MAGNESIUM: 2.4 MG/DL (ref 1.6–2.6)
MAGNESIUM: 2.5 MG/DL (ref 1.6–2.6)
MAGNESIUM: 3 MG/DL (ref 1.6–2.6)
MCH RBC QN AUTO: 29.6 PG (ref 26–35)
MCH RBC QN AUTO: 29.8 PG (ref 26–35)
MCH RBC QN AUTO: 29.9 PG (ref 26–35)
MCH RBC QN AUTO: 30.2 PG (ref 26–35)
MCH RBC QN AUTO: 30.2 PG (ref 26–35)
MCH RBC QN AUTO: 30.4 PG (ref 26–35)
MCH RBC QN AUTO: 30.5 PG (ref 26–35)
MCH RBC QN AUTO: 31.2 PG (ref 26–35)
MCH RBC QN AUTO: 31.7 PG (ref 26–35)
MCH RBC QN AUTO: 31.8 PG (ref 26–35)
MCH RBC QN AUTO: 31.9 PG (ref 26–35)
MCH RBC QN AUTO: 32.1 PG (ref 26–35)
MCH RBC QN AUTO: 32.1 PG (ref 26–35)
MCH RBC QN AUTO: 32.3 PG (ref 26–35)
MCH RBC QN AUTO: 32.5 PG (ref 26–35)
MCH RBC QN AUTO: 32.6 PG (ref 26–35)
MCH RBC QN AUTO: 32.8 PG (ref 26–35)
MCHC RBC AUTO-ENTMCNC: 26.3 % (ref 32–34.5)
MCHC RBC AUTO-ENTMCNC: 26.5 % (ref 32–34.5)
MCHC RBC AUTO-ENTMCNC: 27.3 % (ref 32–34.5)
MCHC RBC AUTO-ENTMCNC: 28.5 % (ref 32–34.5)
MCHC RBC AUTO-ENTMCNC: 29 % (ref 32–34.5)
MCHC RBC AUTO-ENTMCNC: 29 % (ref 32–34.5)
MCHC RBC AUTO-ENTMCNC: 29.1 % (ref 32–34.5)
MCHC RBC AUTO-ENTMCNC: 29.2 % (ref 32–34.5)
MCHC RBC AUTO-ENTMCNC: 29.4 % (ref 32–34.5)
MCHC RBC AUTO-ENTMCNC: 29.6 % (ref 32–34.5)
MCHC RBC AUTO-ENTMCNC: 29.7 % (ref 32–34.5)
MCHC RBC AUTO-ENTMCNC: 29.8 % (ref 32–34.5)
MCHC RBC AUTO-ENTMCNC: 29.9 % (ref 32–34.5)
MCHC RBC AUTO-ENTMCNC: 30 % (ref 32–34.5)
MCHC RBC AUTO-ENTMCNC: 30 % (ref 32–34.5)
MCHC RBC AUTO-ENTMCNC: 30.1 % (ref 32–34.5)
MCHC RBC AUTO-ENTMCNC: 30.2 % (ref 32–34.5)
MCHC RBC AUTO-ENTMCNC: 30.6 % (ref 32–34.5)
MCV RBC AUTO: 101.4 FL (ref 80–99.9)
MCV RBC AUTO: 102.1 FL (ref 80–99.9)
MCV RBC AUTO: 102.6 FL (ref 80–99.9)
MCV RBC AUTO: 104.1 FL (ref 80–99.9)
MCV RBC AUTO: 104.2 FL (ref 80–99.9)
MCV RBC AUTO: 105.8 FL (ref 80–99.9)
MCV RBC AUTO: 106.2 FL (ref 80–99.9)
MCV RBC AUTO: 106.5 FL (ref 80–99.9)
MCV RBC AUTO: 107.2 FL (ref 80–99.9)
MCV RBC AUTO: 107.5 FL (ref 80–99.9)
MCV RBC AUTO: 107.8 FL (ref 80–99.9)
MCV RBC AUTO: 107.9 FL (ref 80–99.9)
MCV RBC AUTO: 108.5 FL (ref 80–99.9)
MCV RBC AUTO: 108.9 FL (ref 80–99.9)
MCV RBC AUTO: 109.5 FL (ref 80–99.9)
MCV RBC AUTO: 109.6 FL (ref 80–99.9)
MCV RBC AUTO: 109.9 FL (ref 80–99.9)
MCV RBC AUTO: 110 FL (ref 80–99.9)
MCV RBC AUTO: 114.5 FL (ref 80–99.9)
MCV RBC AUTO: 116 FL (ref 80–99.9)
MCV RBC AUTO: 99.3 FL (ref 80–99.9)
METAMYELOCYTES RELATIVE PERCENT: 0 % (ref 0–1)
METAMYELOCYTES RELATIVE PERCENT: 0.9 % (ref 0–1)
METAMYELOCYTES RELATIVE PERCENT: 0.9 % (ref 0–1)
METER GLUCOSE: 297 MG/DL (ref 74–99)
METER GLUCOSE: 93 MG/DL (ref 74–99)
METHB: 0.6 % (ref 0–1.5)
METHB: 0.6 % (ref 0–1.5)
METHB: 0.7 % (ref 0–1.5)
METHB: 0.7 % (ref 0–1.5)
MODE: ABNORMAL
MODE: AC
MONOCYTES ABSOLUTE: 0.25 E9/L (ref 0.1–0.95)
MONOCYTES ABSOLUTE: 0.25 E9/L (ref 0.1–0.95)
MONOCYTES ABSOLUTE: 0.41 E9/L (ref 0.1–0.95)
MONOCYTES ABSOLUTE: 0.76 E9/L (ref 0.1–0.95)
MONOCYTES ABSOLUTE: 0.78 E9/L (ref 0.1–0.95)
MONOCYTES ABSOLUTE: 0.98 E9/L (ref 0.1–0.95)
MONOCYTES ABSOLUTE: 1.06 E9/L (ref 0.1–0.95)
MONOCYTES ABSOLUTE: 1.18 E9/L (ref 0.1–0.95)
MONOCYTES ABSOLUTE: 1.25 E9/L (ref 0.1–0.95)
MONOCYTES ABSOLUTE: 1.51 E9/L (ref 0.1–0.95)
MONOCYTES ABSOLUTE: 1.6 E9/L (ref 0.1–0.95)
MONOCYTES ABSOLUTE: 1.7 E9/L (ref 0.1–0.95)
MONOCYTES ABSOLUTE: 12.92 E9/L (ref 0.1–0.95)
MONOCYTES ABSOLUTE: 2.72 E9/L (ref 0.1–0.95)
MONOCYTES ABSOLUTE: 20.5 E9/L (ref 0.1–0.95)
MONOCYTES ABSOLUTE: 28.16 E9/L (ref 0.1–0.95)
MONOCYTES ABSOLUTE: 3.29 E9/L (ref 0.1–0.95)
MONOCYTES ABSOLUTE: 3.42 E9/L (ref 0.1–0.95)
MONOCYTES RELATIVE PERCENT: 11.3 % (ref 2–12)
MONOCYTES RELATIVE PERCENT: 13 % (ref 2–12)
MONOCYTES RELATIVE PERCENT: 14.7 % (ref 2–12)
MONOCYTES RELATIVE PERCENT: 15 % (ref 2–12)
MONOCYTES RELATIVE PERCENT: 2.6 % (ref 2–12)
MONOCYTES RELATIVE PERCENT: 20 % (ref 2–12)
MONOCYTES RELATIVE PERCENT: 3 % (ref 2–12)
MONOCYTES RELATIVE PERCENT: 4 % (ref 2–12)
MONOCYTES RELATIVE PERCENT: 4 % (ref 2–12)
MONOCYTES RELATIVE PERCENT: 40.5 % (ref 2–12)
MONOCYTES RELATIVE PERCENT: 41.8 % (ref 2–12)
MONOCYTES RELATIVE PERCENT: 5 % (ref 2–12)
MONOCYTES RELATIVE PERCENT: 55 % (ref 2–12)
MONOCYTES RELATIVE PERCENT: 6.1 % (ref 2–12)
MONOCYTES RELATIVE PERCENT: 6.1 % (ref 2–12)
MONOCYTES RELATIVE PERCENT: 6.9 % (ref 2–12)
MRSA CULTURE ONLY: NORMAL
MYCOPLASMA PNEUMONIAE BY PCR: NOT DETECTED
MYCOPLASMA PNEUMONIAE BY PCR: NOT DETECTED
MYELOCYTE PERCENT: 0.9 % (ref 0–0)
NEUTROPHILS ABSOLUTE: 12.78 E9/L (ref 1.8–7.3)
NEUTROPHILS ABSOLUTE: 16.34 E9/L (ref 1.8–7.3)
NEUTROPHILS ABSOLUTE: 16.38 E9/L (ref 1.8–7.3)
NEUTROPHILS ABSOLUTE: 16.87 E9/L (ref 1.8–7.3)
NEUTROPHILS ABSOLUTE: 17.92 E9/L (ref 1.8–7.3)
NEUTROPHILS ABSOLUTE: 19.15 E9/L (ref 1.8–7.3)
NEUTROPHILS ABSOLUTE: 19.4 E9/L (ref 1.8–7.3)
NEUTROPHILS ABSOLUTE: 19.41 E9/L (ref 1.8–7.3)
NEUTROPHILS ABSOLUTE: 20.25 E9/L (ref 1.8–7.3)
NEUTROPHILS ABSOLUTE: 20.49 E9/L (ref 1.8–7.3)
NEUTROPHILS ABSOLUTE: 21 E9/L (ref 1.8–7.3)
NEUTROPHILS ABSOLUTE: 22.94 E9/L (ref 1.8–7.3)
NEUTROPHILS ABSOLUTE: 25.28 E9/L (ref 1.8–7.3)
NEUTROPHILS ABSOLUTE: 3.79 E9/L (ref 1.8–7.3)
NEUTROPHILS ABSOLUTE: 4.43 E9/L (ref 1.8–7.3)
NEUTROPHILS ABSOLUTE: 5.1 E9/L (ref 1.8–7.3)
NEUTROPHILS ABSOLUTE: 7.89 E9/L (ref 1.8–7.3)
NEUTROPHILS ABSOLUTE: 9.77 E9/L (ref 1.8–7.3)
NEUTROPHILS RELATIVE PERCENT: 30.6 % (ref 43–80)
NEUTROPHILS RELATIVE PERCENT: 35 % (ref 43–80)
NEUTROPHILS RELATIVE PERCENT: 47.3 % (ref 43–80)
NEUTROPHILS RELATIVE PERCENT: 71.7 % (ref 43–80)
NEUTROPHILS RELATIVE PERCENT: 73.5 % (ref 43–80)
NEUTROPHILS RELATIVE PERCENT: 75 % (ref 43–80)
NEUTROPHILS RELATIVE PERCENT: 77.2 % (ref 43–80)
NEUTROPHILS RELATIVE PERCENT: 81 % (ref 43–80)
NEUTROPHILS RELATIVE PERCENT: 81 % (ref 43–80)
NEUTROPHILS RELATIVE PERCENT: 81.7 % (ref 43–80)
NEUTROPHILS RELATIVE PERCENT: 82.6 % (ref 43–80)
NEUTROPHILS RELATIVE PERCENT: 84 % (ref 43–80)
NEUTROPHILS RELATIVE PERCENT: 84 % (ref 43–80)
NEUTROPHILS RELATIVE PERCENT: 86 % (ref 43–80)
NEUTROPHILS RELATIVE PERCENT: 87.8 % (ref 43–80)
NEUTROPHILS RELATIVE PERCENT: 92 % (ref 43–80)
NEUTROPHILS RELATIVE PERCENT: 94 % (ref 43–80)
NEUTROPHILS RELATIVE PERCENT: 94.8 % (ref 43–80)
NITRITE, URINE: NEGATIVE
NUCLEATED RED BLOOD CELLS: 0 /100 WBC
NUCLEATED RED BLOOD CELLS: 1 /100 WBC
NUCLEATED RED BLOOD CELLS: 16.2 /100 WBC
NUCLEATED RED BLOOD CELLS: 24.5 /100 WBC
NUCLEATED RED BLOOD CELLS: 6 /100 WBC
O2 CONTENT: 10.8 ML/DL
O2 CONTENT: 11 ML/DL
O2 CONTENT: 13 ML/DL
O2 CONTENT: 9.9 ML/DL
O2 SATURATION: 87.4 % (ref 92–98.5)
O2 SATURATION: 88.1 % (ref 92–98.5)
O2 SATURATION: 88.8 % (ref 92–98.5)
O2 SATURATION: 97.6 % (ref 92–98.5)
O2HB: 86.4 % (ref 94–97)
O2HB: 87.1 % (ref 94–97)
O2HB: 87.8 % (ref 94–97)
O2HB: 96.6 % (ref 94–97)
OPERATOR ID: 1874
OPERATOR ID: 2577
OPERATOR ID: 467
OPERATOR ID: ABNORMAL
OSMOLALITY URINE: 429 MOSM/KG (ref 300–900)
OVALOCYTES: ABNORMAL
PARAINFLUENZA VIRUS 1 BY PCR: NOT DETECTED
PARAINFLUENZA VIRUS 1 BY PCR: NOT DETECTED
PARAINFLUENZA VIRUS 2 BY PCR: NOT DETECTED
PARAINFLUENZA VIRUS 2 BY PCR: NOT DETECTED
PARAINFLUENZA VIRUS 3 BY PCR: NOT DETECTED
PARAINFLUENZA VIRUS 3 BY PCR: NOT DETECTED
PARAINFLUENZA VIRUS 4 BY PCR: NOT DETECTED
PARAINFLUENZA VIRUS 4 BY PCR: NOT DETECTED
PATHOLOGIST REVIEW: NORMAL
PATIENT TEMP: 37 C
PCO2: 42.2 MMHG (ref 35–45)
PCO2: 46.2 MMHG (ref 35–45)
PCO2: 49.6 MMHG (ref 35–45)
PCO2: 88.8 MMHG (ref 35–45)
PDW BLD-RTO: 21 FL (ref 11.5–15)
PDW BLD-RTO: 21.1 FL (ref 11.5–15)
PDW BLD-RTO: 21.3 FL (ref 11.5–15)
PDW BLD-RTO: 21.5 FL (ref 11.5–15)
PDW BLD-RTO: 21.7 FL (ref 11.5–15)
PDW BLD-RTO: 21.8 FL (ref 11.5–15)
PDW BLD-RTO: 21.9 FL (ref 11.5–15)
PDW BLD-RTO: 21.9 FL (ref 11.5–15)
PDW BLD-RTO: 22 FL (ref 11.5–15)
PDW BLD-RTO: 22.2 FL (ref 11.5–15)
PDW BLD-RTO: 22.2 FL (ref 11.5–15)
PDW BLD-RTO: 22.5 FL (ref 11.5–15)
PDW BLD-RTO: 22.5 FL (ref 11.5–15)
PDW BLD-RTO: 22.7 FL (ref 11.5–15)
PDW BLD-RTO: 22.8 FL (ref 11.5–15)
PDW BLD-RTO: 22.9 FL (ref 11.5–15)
PDW BLD-RTO: 22.9 FL (ref 11.5–15)
PDW BLD-RTO: 23.2 FL (ref 11.5–15)
PDW BLD-RTO: 23.6 FL (ref 11.5–15)
PDW BLD-RTO: 23.6 FL (ref 11.5–15)
PDW BLD-RTO: 23.9 FL (ref 11.5–15)
PEEP/CPAP: 12 CMH2O
PEEP/CPAP: 8 CMH2O
PEEP/CPAP: 8 CMH2O
PERFORMED ON: ABNORMAL
PFO2: 0.67 MMHG/%
PFO2: 0.67 MMHG/%
PFO2: 0.87 MMHG/%
PFO2: 1.25 MMHG/%
PH BLOOD GAS: 6.95 (ref 7.35–7.45)
PH BLOOD GAS: 7.06 (ref 7.35–7.45)
PH BLOOD GAS: 7.09 (ref 7.35–7.45)
PH BLOOD GAS: 7.25 (ref 7.35–7.45)
PH UA: 5 (ref 5–9)
PH UA: 5 (ref 5–9)
PH UA: 6 (ref 5–9)
PH UA: 6 (ref 5–9)
PHOSPHORUS: 11.4 MG/DL (ref 2.5–4.5)
PHOSPHORUS: 3.2 MG/DL (ref 2.5–4.5)
PHOSPHORUS: 3.3 MG/DL (ref 2.5–4.5)
PHOSPHORUS: 5.6 MG/DL (ref 2.5–4.5)
PHOSPHORUS: 9.7 MG/DL (ref 2.5–4.5)
PHOSPHORUS: 9.9 MG/DL (ref 2.5–4.5)
PLATELET # BLD: 1177 E9/L (ref 130–450)
PLATELET # BLD: 1241 E9/L (ref 130–450)
PLATELET # BLD: 1258 E9/L (ref 130–450)
PLATELET # BLD: 1296 E9/L (ref 130–450)
PLATELET # BLD: 318 E9/L (ref 130–450)
PLATELET # BLD: 399 E9/L (ref 130–450)
PLATELET # BLD: 412 E9/L (ref 130–450)
PLATELET # BLD: 437 E9/L (ref 130–450)
PLATELET # BLD: 443 E9/L (ref 130–450)
PLATELET # BLD: 470 E9/L (ref 130–450)
PLATELET # BLD: 479 E9/L (ref 130–450)
PLATELET # BLD: 486 E9/L (ref 130–450)
PLATELET # BLD: 575 E9/L (ref 130–450)
PLATELET # BLD: 598 E9/L (ref 130–450)
PLATELET # BLD: 604 E9/L (ref 130–450)
PLATELET # BLD: 613 E9/L (ref 130–450)
PLATELET # BLD: 618 E9/L (ref 130–450)
PLATELET # BLD: 625 E9/L (ref 130–450)
PLATELET # BLD: 63 E9/L (ref 130–450)
PLATELET # BLD: 814 E9/L (ref 130–450)
PLATELET # BLD: 930 E9/L (ref 130–450)
PLATELET CONFIRMATION: NORMAL
PMV BLD AUTO: 10.1 FL (ref 7–12)
PMV BLD AUTO: 10.2 FL (ref 7–12)
PMV BLD AUTO: 10.3 FL (ref 7–12)
PMV BLD AUTO: 10.5 FL (ref 7–12)
PMV BLD AUTO: 10.6 FL (ref 7–12)
PMV BLD AUTO: 10.9 FL (ref 7–12)
PMV BLD AUTO: 10.9 FL (ref 7–12)
PMV BLD AUTO: 11.2 FL (ref 7–12)
PMV BLD AUTO: 11.9 FL (ref 7–12)
PMV BLD AUTO: 12.2 FL (ref 7–12)
PMV BLD AUTO: 12.3 FL (ref 7–12)
PMV BLD AUTO: 12.5 FL (ref 7–12)
PMV BLD AUTO: 12.6 FL (ref 7–12)
PMV BLD AUTO: 12.7 FL (ref 7–12)
PMV BLD AUTO: 13.2 FL (ref 7–12)
PMV BLD AUTO: ABNORMAL FL (ref 7–12)
PO2: 124.5 MMHG (ref 75–100)
PO2: 66.6 MMHG (ref 75–100)
PO2: 67.3 MMHG (ref 75–100)
PO2: 86.7 MMHG (ref 75–100)
POC CHLORIDE: 104 MMOL/L (ref 100–108)
POC CREATININE: 1.9 MG/DL (ref 0.5–1)
POC POTASSIUM: 3.4 MMOL/L (ref 3.5–5)
POC SODIUM: 139 MMOL/L (ref 132–146)
POIKILOCYTES: ABNORMAL
POLYCHROMASIA: ABNORMAL
POTASSIUM REFLEX MAGNESIUM: 3.7 MMOL/L (ref 3.5–5)
POTASSIUM REFLEX MAGNESIUM: 3.9 MMOL/L (ref 3.5–5)
POTASSIUM REFLEX MAGNESIUM: 3.9 MMOL/L (ref 3.5–5)
POTASSIUM REFLEX MAGNESIUM: 4 MMOL/L (ref 3.5–5)
POTASSIUM REFLEX MAGNESIUM: 4.1 MMOL/L (ref 3.5–5)
POTASSIUM SERPL-SCNC: 3.4 MMOL/L (ref 3.5–5)
POTASSIUM SERPL-SCNC: 3.7 MMOL/L (ref 3.5–5)
POTASSIUM SERPL-SCNC: 3.8 MMOL/L (ref 3.5–5)
POTASSIUM SERPL-SCNC: 3.8 MMOL/L (ref 3.5–5)
POTASSIUM SERPL-SCNC: 3.9 MMOL/L (ref 3.5–5)
POTASSIUM SERPL-SCNC: 3.9 MMOL/L (ref 3.5–5)
POTASSIUM SERPL-SCNC: 4.1 MMOL/L (ref 3.5–5)
POTASSIUM SERPL-SCNC: 4.1 MMOL/L (ref 3.5–5)
POTASSIUM SERPL-SCNC: 4.2 MMOL/L (ref 3.5–5)
POTASSIUM SERPL-SCNC: 5 MMOL/L (ref 3.5–5)
POTASSIUM SERPL-SCNC: 5.1 MMOL/L (ref 3.5–5)
POTASSIUM SERPL-SCNC: 5.2 MMOL/L (ref 3.5–5)
PRO-BNP: 2842 PG/ML (ref 0–125)
PRO-BNP: 3105 PG/ML (ref 0–125)
PRO-BNP: 5854 PG/ML (ref 0–125)
PRO-BNP: ABNORMAL PG/ML (ref 0–125)
PROCALCITONIN: 8.63 NG/ML (ref 0–0.08)
PROMYELOCYTES PERCENT: 3.6 % (ref 0–0)
PROTEIN UA: 100 MG/DL
PROTEIN UA: 100 MG/DL
PROTEIN UA: 30 MG/DL
PROTEIN UA: NORMAL MG/DL
PROTHROMBIN TIME: 19 SEC (ref 9.3–12.4)
PROTHROMBIN TIME: 22.3 SEC (ref 9.3–12.4)
PROTHROMBIN TIME: 24.8 SEC (ref 9.3–12.4)
PROTHROMBIN TIME: 25.9 SEC (ref 9.3–12.4)
PROTHROMBIN TIME: 26.7 SEC (ref 9.3–12.4)
PROTHROMBIN TIME: 28.8 SEC (ref 9.3–12.4)
PROTHROMBIN TIME: 32.2 SEC (ref 9.3–12.4)
PROTHROMBIN TIME: 33.1 SEC (ref 9.3–12.4)
PROTHROMBIN TIME: 40.7 SEC (ref 9.3–12.4)
PROTHROMBIN TIME: 51.9 SEC (ref 9.3–12.4)
PROTHROMBIN TIME: 52.7 SEC (ref 9.3–12.4)
PROTHROMBIN TIME: 74.1 SEC (ref 9.3–12.4)
R (REACTION TIME): 14.4 MIN (ref 5–10)
RBC # BLD: 2.27 E12/L (ref 3.5–5.5)
RBC # BLD: 2.62 E12/L (ref 3.5–5.5)
RBC # BLD: 2.72 E12/L (ref 3.5–5.5)
RBC # BLD: 2.83 E12/L (ref 3.5–5.5)
RBC # BLD: 2.89 E12/L (ref 3.5–5.5)
RBC # BLD: 2.91 E12/L (ref 3.5–5.5)
RBC # BLD: 2.91 E12/L (ref 3.5–5.5)
RBC # BLD: 2.95 E12/L (ref 3.5–5.5)
RBC # BLD: 2.96 E12/L (ref 3.5–5.5)
RBC # BLD: 3 E12/L (ref 3.5–5.5)
RBC # BLD: 3.03 E12/L (ref 3.5–5.5)
RBC # BLD: 3.11 E12/L (ref 3.5–5.5)
RBC # BLD: 3.19 E12/L (ref 3.5–5.5)
RBC # BLD: 3.29 E12/L (ref 3.5–5.5)
RBC # BLD: 3.44 E12/L (ref 3.5–5.5)
RBC # BLD: 3.44 E12/L (ref 3.5–5.5)
RBC # BLD: 3.46 E12/L (ref 3.5–5.5)
RBC # BLD: 3.54 E12/L (ref 3.5–5.5)
RBC # BLD: 3.55 E12/L (ref 3.5–5.5)
RBC # BLD: 3.58 E12/L (ref 3.5–5.5)
RBC # BLD: 3.88 E12/L (ref 3.5–5.5)
RBC UA: ABNORMAL /HPF (ref 0–2)
RBC UA: NORMAL /HPF (ref 0–2)
REASON FOR REJECTION: NORMAL
REASON FOR REJECTION: NORMAL
REJECTED TEST: NORMAL
REJECTED TEST: NORMAL
RESPIRATORY SYNCYTIAL VIRUS BY PCR: NOT DETECTED
RESPIRATORY SYNCYTIAL VIRUS BY PCR: NOT DETECTED
RI(T): 4.19
RI(T): 591 %
RI(T): 8.49
RI(T): 8.64
RR MECHANICAL: 20 B/MIN
RR MECHANICAL: 22 B/MIN
RR MECHANICAL: 22 B/MIN
SARS-COV-2, PCR: DETECTED
SCHISTOCYTES: ABNORMAL
SEDIMENTATION RATE, ERYTHROCYTE: 83 MM/HR (ref 0–20)
SEDIMENTATION RATE, ERYTHROCYTE: 89 MM/HR (ref 0–20)
SEDIMENTATION RATE, ERYTHROCYTE: 93 MM/HR (ref 0–20)
SODIUM BLD-SCNC: 127 MMOL/L (ref 132–146)
SODIUM BLD-SCNC: 129 MMOL/L (ref 132–146)
SODIUM BLD-SCNC: 132 MMOL/L (ref 132–146)
SODIUM BLD-SCNC: 134 MMOL/L (ref 132–146)
SODIUM BLD-SCNC: 135 MMOL/L (ref 132–146)
SODIUM BLD-SCNC: 136 MMOL/L (ref 132–146)
SODIUM BLD-SCNC: 136 MMOL/L (ref 132–146)
SODIUM BLD-SCNC: 138 MMOL/L (ref 132–146)
SODIUM BLD-SCNC: 138 MMOL/L (ref 132–146)
SODIUM BLD-SCNC: 139 MMOL/L (ref 132–146)
SODIUM BLD-SCNC: 140 MMOL/L (ref 132–146)
SODIUM BLD-SCNC: 141 MMOL/L (ref 132–146)
SODIUM BLD-SCNC: 141 MMOL/L (ref 132–146)
SODIUM BLD-SCNC: 142 MMOL/L (ref 132–146)
SODIUM BLD-SCNC: 144 MMOL/L (ref 132–146)
SODIUM URINE: 57 MMOL/L
SODIUM URINE: 58 MMOL/L
SOURCE, BLOOD GAS: ABNORMAL
SPECIFIC GRAVITY UA: 1.01 (ref 1–1.03)
SPECIFIC GRAVITY UA: 1.01 (ref 1–1.03)
SPECIFIC GRAVITY UA: 1.02 (ref 1–1.03)
SPECIFIC GRAVITY UA: >=1.03 (ref 1–1.03)
SPHEROCYTES: ABNORMAL
STOMATOCYTES: ABNORMAL
STREP PNEUMONIAE ANTIGEN, URINE: NORMAL
STREP PNEUMONIAE ANTIGEN, URINE: NORMAL
TEAR DROP CELLS: ABNORMAL
THB: 7.9 G/DL (ref 11.5–16.5)
THB: 8.8 G/DL (ref 11.5–16.5)
THB: 8.9 G/DL (ref 11.5–16.5)
THB: 9.4 G/DL (ref 11.5–16.5)
TIME ANALYZED: 1035
TIME ANALYZED: 2329
TIME ANALYZED: 237
TIME ANALYZED: 646
TOTAL PROTEIN: 4.8 G/DL (ref 6.4–8.3)
TOTAL PROTEIN: 6.5 G/DL (ref 6.4–8.3)
TOTAL PROTEIN: 6.6 G/DL (ref 6.4–8.3)
TOTAL PROTEIN: 6.8 G/DL (ref 6.4–8.3)
TOTAL PROTEIN: 7 G/DL (ref 6.4–8.3)
TOTAL PROTEIN: 7.2 G/DL (ref 6.4–8.3)
TOTAL PROTEIN: 7.3 G/DL (ref 6.4–8.3)
TOTAL PROTEIN: 7.9 G/DL (ref 6.4–8.3)
TOTAL PROTEIN: 8.1 G/DL (ref 6.4–8.3)
TOTAL PROTEIN: 8.9 G/DL (ref 6.4–8.3)
TOXIC GRANULATION: ABNORMAL
TROPONIN: 0.02 NG/ML (ref 0–0.03)
TROPONIN: 0.03 NG/ML (ref 0–0.03)
TROPONIN: 0.07 NG/ML (ref 0–0.03)
TROPONIN: 0.51 NG/ML (ref 0–0.03)
TROPONIN: 0.73 NG/ML (ref 0–0.03)
TROPONIN: <0.01 NG/ML (ref 0–0.03)
TSH SERPL DL<=0.05 MIU/L-ACNC: 2.51 UIU/ML (ref 0.27–4.2)
UROBILINOGEN, URINE: 0.2 E.U./DL
UROBILINOGEN, URINE: 1 E.U./DL
VANCOMYCIN RANDOM: 15.7 MCG/ML (ref 5–40)
VT MECHANICAL: 500 ML
VT MECHANICAL: 500 ML
VT MECHANICAL: 510 ML
WBC # BLD: 13.6 E9/L (ref 4.5–11.5)
WBC # BLD: 19 E9/L (ref 4.5–11.5)
WBC # BLD: 19.5 E9/L (ref 4.5–11.5)
WBC # BLD: 21.1 E9/L (ref 4.5–11.5)
WBC # BLD: 22.8 E9/L (ref 4.5–11.5)
WBC # BLD: 22.8 E9/L (ref 4.5–11.5)
WBC # BLD: 24.7 E9/L (ref 4.5–11.5)
WBC # BLD: 25 E9/L (ref 4.5–11.5)
WBC # BLD: 25.2 E9/L (ref 4.5–11.5)
WBC # BLD: 25.3 E9/L (ref 4.5–11.5)
WBC # BLD: 27.1 E9/L (ref 4.5–11.5)
WBC # BLD: 28.4 E9/L (ref 4.5–11.5)
WBC # BLD: 31.5 E9/L (ref 4.5–11.5)
WBC # BLD: 32.9 E9/L (ref 4.5–11.5)
WBC # BLD: 48.8 E9/L (ref 4.5–11.5)
WBC # BLD: 5.3 E9/L (ref 4.5–11.5)
WBC # BLD: 5.9 E9/L (ref 4.5–11.5)
WBC # BLD: 51.2 E9/L (ref 4.5–11.5)
WBC # BLD: 6.3 E9/L (ref 4.5–11.5)
WBC # BLD: 64.8 E9/L (ref 4.5–11.5)
WBC # BLD: 8.3 E9/L (ref 4.5–11.5)
WBC UA: ABNORMAL /HPF (ref 0–5)
WBC UA: NORMAL /HPF (ref 0–5)

## 2020-01-01 PROCEDURE — 6360000002 HC RX W HCPCS: Performed by: INTERNAL MEDICINE

## 2020-01-01 PROCEDURE — 86850 RBC ANTIBODY SCREEN: CPT

## 2020-01-01 PROCEDURE — 6360000002 HC RX W HCPCS: Performed by: STUDENT IN AN ORGANIZED HEALTH CARE EDUCATION/TRAINING PROGRAM

## 2020-01-01 PROCEDURE — 85610 PROTHROMBIN TIME: CPT

## 2020-01-01 PROCEDURE — 81001 URINALYSIS AUTO W/SCOPE: CPT

## 2020-01-01 PROCEDURE — 83935 ASSAY OF URINE OSMOLALITY: CPT

## 2020-01-01 PROCEDURE — 84100 ASSAY OF PHOSPHORUS: CPT

## 2020-01-01 PROCEDURE — 36430 TRANSFUSION BLD/BLD COMPNT: CPT

## 2020-01-01 PROCEDURE — 85025 COMPLETE CBC W/AUTO DIFF WBC: CPT

## 2020-01-01 PROCEDURE — 99213 OFFICE O/P EST LOW 20 MIN: CPT | Performed by: SURGERY

## 2020-01-01 PROCEDURE — 80053 COMPREHEN METABOLIC PANEL: CPT

## 2020-01-01 PROCEDURE — 80202 ASSAY OF VANCOMYCIN: CPT

## 2020-01-01 PROCEDURE — 6360000002 HC RX W HCPCS

## 2020-01-01 PROCEDURE — 83880 ASSAY OF NATRIURETIC PEPTIDE: CPT

## 2020-01-01 PROCEDURE — 87040 BLOOD CULTURE FOR BACTERIA: CPT

## 2020-01-01 PROCEDURE — 77066 DX MAMMO INCL CAD BI: CPT

## 2020-01-01 PROCEDURE — 2060000000 HC ICU INTERMEDIATE R&B

## 2020-01-01 PROCEDURE — 2580000003 HC RX 258: Performed by: RADIOLOGY

## 2020-01-01 PROCEDURE — 71250 CT THORAX DX C-: CPT

## 2020-01-01 PROCEDURE — 0202U NFCT DS 22 TRGT SARS-COV-2: CPT

## 2020-01-01 PROCEDURE — 96366 THER/PROPH/DIAG IV INF ADDON: CPT

## 2020-01-01 PROCEDURE — 96361 HYDRATE IV INFUSION ADD-ON: CPT

## 2020-01-01 PROCEDURE — 83615 LACTATE (LD) (LDH) ENZYME: CPT

## 2020-01-01 PROCEDURE — 2580000003 HC RX 258: Performed by: GENERAL PRACTICE

## 2020-01-01 PROCEDURE — 6370000000 HC RX 637 (ALT 250 FOR IP): Performed by: INTERNAL MEDICINE

## 2020-01-01 PROCEDURE — 36415 COLL VENOUS BLD VENIPUNCTURE: CPT

## 2020-01-01 PROCEDURE — 71275 CT ANGIOGRAPHY CHEST: CPT

## 2020-01-01 PROCEDURE — P9059 PLASMA, FRZ BETWEEN 8-24HOUR: HCPCS

## 2020-01-01 PROCEDURE — 6360000004 HC RX CONTRAST MEDICATION: Performed by: RADIOLOGY

## 2020-01-01 PROCEDURE — 84145 PROCALCITONIN (PCT): CPT

## 2020-01-01 PROCEDURE — 93005 ELECTROCARDIOGRAM TRACING: CPT | Performed by: EMERGENCY MEDICINE

## 2020-01-01 PROCEDURE — 82330 ASSAY OF CALCIUM: CPT

## 2020-01-01 PROCEDURE — 82570 ASSAY OF URINE CREATININE: CPT

## 2020-01-01 PROCEDURE — 83036 HEMOGLOBIN GLYCOSYLATED A1C: CPT

## 2020-01-01 PROCEDURE — 6360000002 HC RX W HCPCS: Performed by: SPECIALIST

## 2020-01-01 PROCEDURE — 2500000003 HC RX 250 WO HCPCS: Performed by: EMERGENCY MEDICINE

## 2020-01-01 PROCEDURE — 99285 EMERGENCY DEPT VISIT HI MDM: CPT

## 2020-01-01 PROCEDURE — 94003 VENT MGMT INPAT SUBQ DAY: CPT

## 2020-01-01 PROCEDURE — 5A1935Z RESPIRATORY VENTILATION, LESS THAN 24 CONSECUTIVE HOURS: ICD-10-PCS | Performed by: INTERNAL MEDICINE

## 2020-01-01 PROCEDURE — 83735 ASSAY OF MAGNESIUM: CPT

## 2020-01-01 PROCEDURE — 74018 RADEX ABDOMEN 1 VIEW: CPT

## 2020-01-01 PROCEDURE — 1036F TOBACCO NON-USER: CPT | Performed by: OTOLARYNGOLOGY

## 2020-01-01 PROCEDURE — 6360000002 HC RX W HCPCS: Performed by: EMERGENCY MEDICINE

## 2020-01-01 PROCEDURE — 84484 ASSAY OF TROPONIN QUANT: CPT

## 2020-01-01 PROCEDURE — 2580000003 HC RX 258: Performed by: EMERGENCY MEDICINE

## 2020-01-01 PROCEDURE — 86140 C-REACTIVE PROTEIN: CPT

## 2020-01-01 PROCEDURE — 96365 THER/PROPH/DIAG IV INF INIT: CPT

## 2020-01-01 PROCEDURE — 82962 GLUCOSE BLOOD TEST: CPT

## 2020-01-01 PROCEDURE — 96368 THER/DIAG CONCURRENT INF: CPT

## 2020-01-01 PROCEDURE — 6360000002 HC RX W HCPCS: Performed by: GENERAL PRACTICE

## 2020-01-01 PROCEDURE — 99284 EMERGENCY DEPT VISIT MOD MDM: CPT

## 2020-01-01 PROCEDURE — 36620 INSERTION CATHETER ARTERY: CPT

## 2020-01-01 PROCEDURE — 96375 TX/PRO/DX INJ NEW DRUG ADDON: CPT

## 2020-01-01 PROCEDURE — 2580000003 HC RX 258: Performed by: INTERNAL MEDICINE

## 2020-01-01 PROCEDURE — 96374 THER/PROPH/DIAG INJ IV PUSH: CPT

## 2020-01-01 PROCEDURE — 85651 RBC SED RATE NONAUTOMATED: CPT

## 2020-01-01 PROCEDURE — 6370000000 HC RX 637 (ALT 250 FOR IP): Performed by: SPECIALIST

## 2020-01-01 PROCEDURE — 99213 OFFICE O/P EST LOW 20 MIN: CPT | Performed by: OTOLARYNGOLOGY

## 2020-01-01 PROCEDURE — 2500000003 HC RX 250 WO HCPCS

## 2020-01-01 PROCEDURE — 93971 EXTREMITY STUDY: CPT

## 2020-01-01 PROCEDURE — 84443 ASSAY THYROID STIM HORMONE: CPT

## 2020-01-01 PROCEDURE — 84132 ASSAY OF SERUM POTASSIUM: CPT

## 2020-01-01 PROCEDURE — 80048 BASIC METABOLIC PNL TOTAL CA: CPT

## 2020-01-01 PROCEDURE — 2000000000 HC ICU R&B

## 2020-01-01 PROCEDURE — 85384 FIBRINOGEN ACTIVITY: CPT

## 2020-01-01 PROCEDURE — 2580000003 HC RX 258: Performed by: SPECIALIST

## 2020-01-01 PROCEDURE — 2580000003 HC RX 258

## 2020-01-01 PROCEDURE — 83605 ASSAY OF LACTIC ACID: CPT

## 2020-01-01 PROCEDURE — P9016 RBC LEUKOCYTES REDUCED: HCPCS

## 2020-01-01 PROCEDURE — 87449 NOS EACH ORGANISM AG IA: CPT

## 2020-01-01 PROCEDURE — 82805 BLOOD GASES W/O2 SATURATION: CPT

## 2020-01-01 PROCEDURE — 87088 URINE BACTERIA CULTURE: CPT

## 2020-01-01 PROCEDURE — 85730 THROMBOPLASTIN TIME PARTIAL: CPT

## 2020-01-01 PROCEDURE — 30905 CONTROL OF NOSEBLEED: CPT

## 2020-01-01 PROCEDURE — 99253 IP/OBS CNSLTJ NEW/EST LOW 45: CPT | Performed by: STUDENT IN AN ORGANIZED HEALTH CARE EDUCATION/TRAINING PROGRAM

## 2020-01-01 PROCEDURE — 85027 COMPLETE CBC AUTOMATED: CPT

## 2020-01-01 PROCEDURE — G8417 CALC BMI ABV UP PARAM F/U: HCPCS | Performed by: OTOLARYNGOLOGY

## 2020-01-01 PROCEDURE — 84295 ASSAY OF SERUM SODIUM: CPT

## 2020-01-01 PROCEDURE — 87081 CULTURE SCREEN ONLY: CPT

## 2020-01-01 PROCEDURE — 2500000003 HC RX 250 WO HCPCS: Performed by: SPECIALIST

## 2020-01-01 PROCEDURE — 78815 PET IMAGE W/CT SKULL-THIGH: CPT

## 2020-01-01 PROCEDURE — 6370000000 HC RX 637 (ALT 250 FOR IP): Performed by: STUDENT IN AN ORGANIZED HEALTH CARE EDUCATION/TRAINING PROGRAM

## 2020-01-01 PROCEDURE — 93005 ELECTROCARDIOGRAM TRACING: CPT | Performed by: STUDENT IN AN ORGANIZED HEALTH CARE EDUCATION/TRAINING PROGRAM

## 2020-01-01 PROCEDURE — 82436 ASSAY OF URINE CHLORIDE: CPT

## 2020-01-01 PROCEDURE — 94002 VENT MGMT INPAT INIT DAY: CPT

## 2020-01-01 PROCEDURE — 83520 IMMUNOASSAY QUANT NOS NONAB: CPT

## 2020-01-01 PROCEDURE — 76770 US EXAM ABDO BACK WALL COMP: CPT

## 2020-01-01 PROCEDURE — 84300 ASSAY OF URINE SODIUM: CPT

## 2020-01-01 PROCEDURE — 0BH18EZ INSERTION OF ENDOTRACHEAL AIRWAY INTO TRACHEA, VIA NATURAL OR ARTIFICIAL OPENING ENDOSCOPIC: ICD-10-PCS | Performed by: EMERGENCY MEDICINE

## 2020-01-01 PROCEDURE — 2500000003 HC RX 250 WO HCPCS: Performed by: INTERNAL MEDICINE

## 2020-01-01 PROCEDURE — 99283 EMERGENCY DEPT VISIT LOW MDM: CPT

## 2020-01-01 PROCEDURE — 71045 X-RAY EXAM CHEST 1 VIEW: CPT

## 2020-01-01 PROCEDURE — G8427 DOCREV CUR MEDS BY ELIG CLIN: HCPCS | Performed by: OTOLARYNGOLOGY

## 2020-01-01 PROCEDURE — 74177 CT ABD & PELVIS W/CONTRAST: CPT

## 2020-01-01 PROCEDURE — 85378 FIBRIN DEGRADE SEMIQUANT: CPT

## 2020-01-01 PROCEDURE — C9113 INJ PANTOPRAZOLE SODIUM, VIA: HCPCS | Performed by: INTERNAL MEDICINE

## 2020-01-01 PROCEDURE — G8484 FLU IMMUNIZE NO ADMIN: HCPCS | Performed by: OTOLARYNGOLOGY

## 2020-01-01 PROCEDURE — 36556 INSERT NON-TUNNEL CV CATH: CPT

## 2020-01-01 PROCEDURE — 82728 ASSAY OF FERRITIN: CPT

## 2020-01-01 PROCEDURE — 86900 BLOOD TYPING SEROLOGIC ABO: CPT

## 2020-01-01 PROCEDURE — 93308 TTE F-UP OR LMTD: CPT

## 2020-01-01 PROCEDURE — 4040F PNEUMOC VAC/ADMIN/RCVD: CPT | Performed by: OTOLARYNGOLOGY

## 2020-01-01 PROCEDURE — 85347 COAGULATION TIME ACTIVATED: CPT

## 2020-01-01 PROCEDURE — XW033E5 INTRODUCTION OF REMDESIVIR ANTI-INFECTIVE INTO PERIPHERAL VEIN, PERCUTANEOUS APPROACH, NEW TECHNOLOGY GROUP 5: ICD-10-PCS | Performed by: INTERNAL MEDICINE

## 2020-01-01 PROCEDURE — 93975 VASCULAR STUDY: CPT

## 2020-01-01 PROCEDURE — 76642 ULTRASOUND BREAST LIMITED: CPT

## 2020-01-01 PROCEDURE — 93970 EXTREMITY STUDY: CPT

## 2020-01-01 PROCEDURE — 74176 CT ABD & PELVIS W/O CONTRAST: CPT

## 2020-01-01 PROCEDURE — 93010 ELECTROCARDIOGRAM REPORT: CPT | Performed by: INTERNAL MEDICINE

## 2020-01-01 PROCEDURE — 99291 CRITICAL CARE FIRST HOUR: CPT

## 2020-01-01 PROCEDURE — 02HV33Z INSERTION OF INFUSION DEVICE INTO SUPERIOR VENA CAVA, PERCUTANEOUS APPROACH: ICD-10-PCS | Performed by: EMERGENCY MEDICINE

## 2020-01-01 PROCEDURE — 84439 ASSAY OF FREE THYROXINE: CPT

## 2020-01-01 PROCEDURE — G8400 PT W/DXA NO RESULTS DOC: HCPCS | Performed by: OTOLARYNGOLOGY

## 2020-01-01 PROCEDURE — G0378 HOSPITAL OBSERVATION PER HR: HCPCS

## 2020-01-01 PROCEDURE — 86923 COMPATIBILITY TEST ELECTRIC: CPT

## 2020-01-01 PROCEDURE — A9552 F18 FDG: HCPCS | Performed by: RADIOLOGY

## 2020-01-01 PROCEDURE — 3017F COLORECTAL CA SCREEN DOC REV: CPT | Performed by: OTOLARYNGOLOGY

## 2020-01-01 PROCEDURE — 85576 BLOOD PLATELET AGGREGATION: CPT

## 2020-01-01 PROCEDURE — 31500 INSERT EMERGENCY AIRWAY: CPT

## 2020-01-01 PROCEDURE — 3430000000 HC RX DIAGNOSTIC RADIOPHARMACEUTICAL: Performed by: RADIOLOGY

## 2020-01-01 PROCEDURE — 70450 CT HEAD/BRAIN W/O DYE: CPT

## 2020-01-01 PROCEDURE — 37799 UNLISTED PX VASCULAR SURGERY: CPT

## 2020-01-01 PROCEDURE — 99024 POSTOP FOLLOW-UP VISIT: CPT | Performed by: PHYSICIAN ASSISTANT

## 2020-01-01 PROCEDURE — 82947 ASSAY GLUCOSE BLOOD QUANT: CPT

## 2020-01-01 PROCEDURE — 82435 ASSAY OF BLOOD CHLORIDE: CPT

## 2020-01-01 PROCEDURE — 93005 ELECTROCARDIOGRAM TRACING: CPT | Performed by: GENERAL PRACTICE

## 2020-01-01 PROCEDURE — 6370000000 HC RX 637 (ALT 250 FOR IP): Performed by: EMERGENCY MEDICINE

## 2020-01-01 PROCEDURE — 86038 ANTINUCLEAR ANTIBODIES: CPT

## 2020-01-01 PROCEDURE — 93005 ELECTROCARDIOGRAM TRACING: CPT | Performed by: INTERNAL MEDICINE

## 2020-01-01 PROCEDURE — 82533 TOTAL CORTISOL: CPT

## 2020-01-01 PROCEDURE — 6370000000 HC RX 637 (ALT 250 FOR IP)

## 2020-01-01 PROCEDURE — 82565 ASSAY OF CREATININE: CPT

## 2020-01-01 PROCEDURE — 2500000003 HC RX 250 WO HCPCS: Performed by: STUDENT IN AN ORGANIZED HEALTH CARE EDUCATION/TRAINING PROGRAM

## 2020-01-01 PROCEDURE — 1090F PRES/ABSN URINE INCON ASSESS: CPT | Performed by: OTOLARYNGOLOGY

## 2020-01-01 PROCEDURE — 0100U HC RESPIRPTHGN MULT REV TRANS & AMP PRB TECH 21 TRGT: CPT

## 2020-01-01 PROCEDURE — 1123F ACP DISCUSS/DSCN MKR DOCD: CPT | Performed by: OTOLARYNGOLOGY

## 2020-01-01 PROCEDURE — 86901 BLOOD TYPING SEROLOGIC RH(D): CPT

## 2020-01-01 PROCEDURE — 77065 DX MAMMO INCL CAD UNI: CPT

## 2020-01-01 PROCEDURE — P9047 ALBUMIN (HUMAN), 25%, 50ML: HCPCS | Performed by: INTERNAL MEDICINE

## 2020-01-01 RX ORDER — ONDANSETRON 2 MG/ML
4 INJECTION INTRAMUSCULAR; INTRAVENOUS EVERY 6 HOURS PRN
Status: DISCONTINUED | OUTPATIENT
Start: 2020-01-01 | End: 2020-01-01 | Stop reason: HOSPADM

## 2020-01-01 RX ORDER — MAGNESIUM SULFATE IN WATER 40 MG/ML
2 INJECTION, SOLUTION INTRAVENOUS ONCE
Status: COMPLETED | OUTPATIENT
Start: 2020-01-01 | End: 2020-01-01

## 2020-01-01 RX ORDER — MIDAZOLAM HYDROCHLORIDE 1 MG/ML
INJECTION INTRAMUSCULAR; INTRAVENOUS
Status: DISCONTINUED
Start: 2020-01-01 | End: 2020-01-01

## 2020-01-01 RX ORDER — PREDNISONE 10 MG/1
10 TABLET ORAL DAILY
COMMUNITY

## 2020-01-01 RX ORDER — FUROSEMIDE 10 MG/ML
40 INJECTION INTRAMUSCULAR; INTRAVENOUS ONCE
Status: COMPLETED | OUTPATIENT
Start: 2020-01-01 | End: 2020-01-01

## 2020-01-01 RX ORDER — VECURONIUM BROMIDE 1 MG/ML
10 INJECTION, POWDER, LYOPHILIZED, FOR SOLUTION INTRAVENOUS ONCE
Status: COMPLETED | OUTPATIENT
Start: 2020-01-01 | End: 2020-01-01

## 2020-01-01 RX ORDER — HYDROCODONE BITARTRATE AND ACETAMINOPHEN 5; 325 MG/1; MG/1
1 TABLET ORAL EVERY 6 HOURS PRN
Qty: 12 TABLET | Refills: 0 | Status: SHIPPED | OUTPATIENT
Start: 2020-01-01 | End: 2020-01-01

## 2020-01-01 RX ORDER — DEXTROSE MONOHYDRATE 25 G/50ML
12.5 INJECTION, SOLUTION INTRAVENOUS PRN
Status: DISCONTINUED | OUTPATIENT
Start: 2020-01-01 | End: 2020-01-01 | Stop reason: HOSPADM

## 2020-01-01 RX ORDER — SODIUM CHLORIDE 0.9 % (FLUSH) 0.9 %
10 SYRINGE (ML) INJECTION EVERY 12 HOURS SCHEDULED
Status: DISCONTINUED | OUTPATIENT
Start: 2020-01-01 | End: 2020-01-01 | Stop reason: HOSPADM

## 2020-01-01 RX ORDER — POTASSIUM BICARBONATE 25 MEQ/1
25 TABLET, EFFERVESCENT ORAL ONCE
Status: DISCONTINUED | OUTPATIENT
Start: 2020-01-01 | End: 2020-01-01 | Stop reason: CLARIF

## 2020-01-01 RX ORDER — 0.9 % SODIUM CHLORIDE 0.9 %
30 INTRAVENOUS SOLUTION INTRAVENOUS ONCE
Status: COMPLETED | OUTPATIENT
Start: 2020-01-01 | End: 2020-01-01

## 2020-01-01 RX ORDER — SODIUM CHLORIDE 9 MG/ML
INJECTION, SOLUTION INTRAVENOUS CONTINUOUS
Status: DISCONTINUED | OUTPATIENT
Start: 2020-01-01 | End: 2020-01-01

## 2020-01-01 RX ORDER — PREDNISONE 10 MG/1
10 TABLET ORAL DAILY
Status: DISCONTINUED | OUTPATIENT
Start: 2020-01-01 | End: 2020-01-01

## 2020-01-01 RX ORDER — HYDROXYUREA 500 MG/1
1000 CAPSULE ORAL DAILY
Status: DISCONTINUED | OUTPATIENT
Start: 2020-01-01 | End: 2020-01-01

## 2020-01-01 RX ORDER — FLUDEOXYGLUCOSE F 18 200 MCI/ML
15 INJECTION, SOLUTION INTRAVENOUS
Status: COMPLETED | OUTPATIENT
Start: 2020-01-01 | End: 2020-01-01

## 2020-01-01 RX ORDER — ACETAMINOPHEN 500 MG
1000 TABLET ORAL ONCE
Status: COMPLETED | OUTPATIENT
Start: 2020-01-01 | End: 2020-01-01

## 2020-01-01 RX ORDER — CEPHALEXIN 500 MG/1
500 CAPSULE ORAL 3 TIMES DAILY
Status: ON HOLD | COMMUNITY
Start: 2020-01-01 | End: 2020-01-01 | Stop reason: HOSPADM

## 2020-01-01 RX ORDER — HYDROCODONE BITARTRATE AND ACETAMINOPHEN 5; 325 MG/1; MG/1
1 TABLET ORAL EVERY 6 HOURS PRN
Status: DISCONTINUED | OUTPATIENT
Start: 2020-01-01 | End: 2020-01-01 | Stop reason: SDUPTHER

## 2020-01-01 RX ORDER — HYDROXYUREA 500 MG/1
1500 CAPSULE ORAL DAILY
Status: DISCONTINUED | OUTPATIENT
Start: 2020-01-01 | End: 2020-01-01

## 2020-01-01 RX ORDER — 0.9 % SODIUM CHLORIDE 0.9 %
1000 INTRAVENOUS SOLUTION INTRAVENOUS ONCE
Status: COMPLETED | OUTPATIENT
Start: 2020-01-01 | End: 2020-01-01

## 2020-01-01 RX ORDER — ERGOCALCIFEROL 1.25 MG/1
50000 CAPSULE ORAL
Status: DISCONTINUED | OUTPATIENT
Start: 2020-01-01 | End: 2020-01-01 | Stop reason: HOSPADM

## 2020-01-01 RX ORDER — GUAIFENESIN 600 MG/1
600 TABLET, EXTENDED RELEASE ORAL 2 TIMES DAILY
Status: DISCONTINUED | OUTPATIENT
Start: 2020-01-01 | End: 2020-01-01 | Stop reason: CLARIF

## 2020-01-01 RX ORDER — ACETAMINOPHEN 160 MG/5ML
650 SOLUTION ORAL EVERY 6 HOURS PRN
Status: DISCONTINUED | OUTPATIENT
Start: 2020-01-01 | End: 2020-01-01 | Stop reason: HOSPADM

## 2020-01-01 RX ORDER — GLYCOPYRROLATE 0.2 MG/ML
0.2 INJECTION INTRAMUSCULAR; INTRAVENOUS EVERY 4 HOURS PRN
Status: DISCONTINUED | OUTPATIENT
Start: 2020-01-01 | End: 2020-01-01 | Stop reason: HOSPADM

## 2020-01-01 RX ORDER — PREDNISONE 10 MG/1
10 TABLET ORAL DAILY
Status: ON HOLD | COMMUNITY
End: 2020-01-01 | Stop reason: SDUPTHER

## 2020-01-01 RX ORDER — AZELASTINE 1 MG/ML
2 SPRAY, METERED NASAL 2 TIMES DAILY
Qty: 1 BOTTLE | Refills: 1 | Status: SHIPPED
Start: 2020-01-01 | End: 2020-01-01

## 2020-01-01 RX ORDER — WARFARIN SODIUM 5 MG/1
5 TABLET ORAL
COMMUNITY
End: 2020-01-01

## 2020-01-01 RX ORDER — PROPOFOL 10 MG/ML
INJECTION, EMULSION INTRAVENOUS
Status: COMPLETED
Start: 2020-01-01 | End: 2020-01-01

## 2020-01-01 RX ORDER — CLONAZEPAM 0.5 MG/1
0.5 TABLET ORAL 2 TIMES DAILY
Qty: 60 TABLET | Refills: 0 | OUTPATIENT
Start: 2020-01-01 | End: 2021-01-03

## 2020-01-01 RX ORDER — SENNA PLUS 8.6 MG/1
1 TABLET ORAL DAILY PRN
Status: DISCONTINUED | OUTPATIENT
Start: 2020-01-01 | End: 2020-01-01 | Stop reason: HOSPADM

## 2020-01-01 RX ORDER — SODIUM CHLORIDE 9 MG/ML
10 INJECTION INTRAVENOUS DAILY
Status: DISCONTINUED | OUTPATIENT
Start: 2020-01-01 | End: 2020-01-01 | Stop reason: HOSPADM

## 2020-01-01 RX ORDER — DIMETHICONE, OXYBENZONE, AND PADIMATE O 2; 2.5; 6.6 G/100G; G/100G; G/100G
STICK TOPICAL
Status: COMPLETED
Start: 2020-01-01 | End: 2020-01-01

## 2020-01-01 RX ORDER — MIDAZOLAM HYDROCHLORIDE 2 MG/2ML
2 INJECTION, SOLUTION INTRAMUSCULAR; INTRAVENOUS ONCE
Status: COMPLETED | OUTPATIENT
Start: 2020-01-01 | End: 2020-01-01

## 2020-01-01 RX ORDER — PROMETHAZINE HYDROCHLORIDE 25 MG/1
12.5 TABLET ORAL EVERY 6 HOURS PRN
Status: DISCONTINUED | OUTPATIENT
Start: 2020-01-01 | End: 2020-01-01 | Stop reason: HOSPADM

## 2020-01-01 RX ORDER — LACTOBACILLUS RHAMNOSUS GG 10B CELL
1 CAPSULE ORAL DAILY
Status: DISCONTINUED | OUTPATIENT
Start: 2020-01-01 | End: 2020-01-01 | Stop reason: HOSPADM

## 2020-01-01 RX ORDER — VASOPRESSIN 20 U/ML
INJECTION PARENTERAL
Status: COMPLETED
Start: 2020-01-01 | End: 2020-01-01

## 2020-01-01 RX ORDER — PANTOPRAZOLE SODIUM 40 MG/10ML
40 INJECTION, POWDER, LYOPHILIZED, FOR SOLUTION INTRAVENOUS DAILY
Status: DISCONTINUED | OUTPATIENT
Start: 2020-01-01 | End: 2020-01-01 | Stop reason: HOSPADM

## 2020-01-01 RX ORDER — LACTOBACILLUS RHAMNOSUS GG 10B CELL
1 CAPSULE ORAL DAILY
Qty: 30 CAPSULE | Refills: 0 | Status: SHIPPED | OUTPATIENT
Start: 2020-01-01

## 2020-01-01 RX ORDER — LEVOFLOXACIN 750 MG/1
750 TABLET ORAL DAILY
Qty: 2 TABLET | Refills: 0 | Status: SHIPPED | OUTPATIENT
Start: 2020-01-01 | End: 2020-01-01

## 2020-01-01 RX ORDER — CHLORHEXIDINE GLUCONATE 0.12 MG/ML
15 RINSE ORAL 2 TIMES DAILY
Status: DISCONTINUED | OUTPATIENT
Start: 2020-01-01 | End: 2020-01-01 | Stop reason: HOSPADM

## 2020-01-01 RX ORDER — SODIUM CHLORIDE 9 MG/ML
INJECTION, SOLUTION INTRAVENOUS ONCE
Status: COMPLETED | OUTPATIENT
Start: 2020-01-01 | End: 2020-01-01

## 2020-01-01 RX ORDER — 0.9 % SODIUM CHLORIDE 0.9 %
20 INTRAVENOUS SOLUTION INTRAVENOUS ONCE
Status: DISCONTINUED | OUTPATIENT
Start: 2020-01-01 | End: 2020-01-01 | Stop reason: HOSPADM

## 2020-01-01 RX ORDER — ONDANSETRON 4 MG/1
4 TABLET, ORALLY DISINTEGRATING ORAL EVERY 8 HOURS PRN
Status: DISCONTINUED | OUTPATIENT
Start: 2020-01-01 | End: 2020-01-01 | Stop reason: HOSPADM

## 2020-01-01 RX ORDER — METHYLPREDNISOLONE 4 MG/1
TABLET ORAL
Qty: 21 TABLET | Refills: 0 | Status: SHIPPED | OUTPATIENT
Start: 2020-01-01 | End: 2020-01-01

## 2020-01-01 RX ORDER — HYDROCODONE BITARTRATE AND ACETAMINOPHEN 5; 325 MG/1; MG/1
1 TABLET ORAL EVERY 6 HOURS PRN
Status: DISCONTINUED | OUTPATIENT
Start: 2020-01-01 | End: 2020-01-01 | Stop reason: HOSPADM

## 2020-01-01 RX ORDER — FENTANYL CITRATE 50 UG/ML
25 INJECTION, SOLUTION INTRAMUSCULAR; INTRAVENOUS EVERY 10 MIN PRN
Status: DISCONTINUED | OUTPATIENT
Start: 2020-01-01 | End: 2020-01-01 | Stop reason: HOSPADM

## 2020-01-01 RX ORDER — ZINC OXIDE 13 %
1 CREAM (GRAM) TOPICAL DAILY
Status: DISCONTINUED | OUTPATIENT
Start: 2020-01-01 | End: 2020-01-01 | Stop reason: CLARIF

## 2020-01-01 RX ORDER — CHOLECALCIFEROL (VITAMIN D3) 50 MCG
2000 TABLET ORAL DAILY
Status: DISCONTINUED | OUTPATIENT
Start: 2020-01-01 | End: 2020-01-01 | Stop reason: HOSPADM

## 2020-01-01 RX ORDER — SODIUM CHLORIDE 0.9 % (FLUSH) 0.9 %
10 SYRINGE (ML) INJECTION PRN
Status: DISCONTINUED | OUTPATIENT
Start: 2020-01-01 | End: 2020-01-01 | Stop reason: HOSPADM

## 2020-01-01 RX ORDER — POTASSIUM CHLORIDE 20 MEQ/1
20 TABLET, EXTENDED RELEASE ORAL 2 TIMES DAILY WITH MEALS
Status: COMPLETED | OUTPATIENT
Start: 2020-01-01 | End: 2020-01-01

## 2020-01-01 RX ORDER — MINERAL OIL AND WHITE PETROLATUM 150; 830 MG/G; MG/G
OINTMENT OPHTHALMIC EVERY 4 HOURS
Status: DISCONTINUED | OUTPATIENT
Start: 2020-01-01 | End: 2020-01-01 | Stop reason: HOSPADM

## 2020-01-01 RX ORDER — LEVETIRACETAM 10 MG/ML
1000 INJECTION INTRAVASCULAR ONCE
Status: COMPLETED | OUTPATIENT
Start: 2020-01-01 | End: 2020-01-01

## 2020-01-01 RX ORDER — FENTANYL CITRATE 50 UG/ML
50 INJECTION, SOLUTION INTRAMUSCULAR; INTRAVENOUS ONCE
Status: DISCONTINUED | OUTPATIENT
Start: 2020-01-01 | End: 2020-01-01

## 2020-01-01 RX ORDER — HEPARIN SODIUM 1000 [USP'U]/ML
80 INJECTION, SOLUTION INTRAVENOUS; SUBCUTANEOUS ONCE
Status: COMPLETED | OUTPATIENT
Start: 2020-01-01 | End: 2020-01-01

## 2020-01-01 RX ORDER — LIDOCAINE HYDROCHLORIDE 10 MG/ML
10 INJECTION, SOLUTION INFILTRATION; PERINEURAL ONCE
Status: COMPLETED | OUTPATIENT
Start: 2020-01-01 | End: 2020-01-01

## 2020-01-01 RX ORDER — SERTRALINE HYDROCHLORIDE 25 MG/1
25 TABLET, FILM COATED ORAL NIGHTLY
Status: DISCONTINUED | OUTPATIENT
Start: 2020-01-01 | End: 2020-01-01 | Stop reason: HOSPADM

## 2020-01-01 RX ORDER — HEPARIN SODIUM 10000 [USP'U]/100ML
18 INJECTION, SOLUTION INTRAVENOUS CONTINUOUS
Status: DISCONTINUED | OUTPATIENT
Start: 2020-01-01 | End: 2020-01-01

## 2020-01-01 RX ORDER — DEXTROSE MONOHYDRATE 100 MG/ML
INJECTION, SOLUTION INTRAVENOUS CONTINUOUS
Status: DISCONTINUED | OUTPATIENT
Start: 2020-01-01 | End: 2020-01-01

## 2020-01-01 RX ORDER — LEVETIRACETAM 10 MG/ML
1000 INJECTION INTRAVASCULAR EVERY 12 HOURS
Status: DISCONTINUED | OUTPATIENT
Start: 2020-01-01 | End: 2020-01-01 | Stop reason: HOSPADM

## 2020-01-01 RX ORDER — AZELASTINE 1 MG/ML
2 SPRAY, METERED NASAL 2 TIMES DAILY
Qty: 1 BOTTLE | Refills: 1 | Status: SHIPPED
Start: 2020-01-01 | End: 2020-01-01 | Stop reason: CLARIF

## 2020-01-01 RX ORDER — ALBUMIN (HUMAN) 12.5 G/50ML
25 SOLUTION INTRAVENOUS ONCE
Status: COMPLETED | OUTPATIENT
Start: 2020-01-01 | End: 2020-01-01

## 2020-01-01 RX ORDER — HYDROXYUREA 500 MG/1
2000 CAPSULE ORAL DAILY
Status: DISCONTINUED | OUTPATIENT
Start: 2020-01-01 | End: 2020-01-01

## 2020-01-01 RX ORDER — DEXAMETHASONE 4 MG/1
6 TABLET ORAL EVERY 12 HOURS SCHEDULED
Status: DISCONTINUED | OUTPATIENT
Start: 2020-01-01 | End: 2020-01-01 | Stop reason: HOSPADM

## 2020-01-01 RX ORDER — ACETAMINOPHEN 325 MG/1
650 TABLET ORAL EVERY 6 HOURS PRN
Status: DISCONTINUED | OUTPATIENT
Start: 2020-01-01 | End: 2020-01-01 | Stop reason: HOSPADM

## 2020-01-01 RX ORDER — PREDNISONE 10 MG/1
10 TABLET ORAL DAILY
Qty: 30 TABLET | Refills: 0 | Status: SHIPPED | OUTPATIENT
Start: 2020-01-01 | End: 2020-01-01

## 2020-01-01 RX ORDER — HYDROCODONE BITARTRATE AND ACETAMINOPHEN 5; 325 MG/1; MG/1
1 TABLET ORAL ONCE
Status: COMPLETED | OUTPATIENT
Start: 2020-01-01 | End: 2020-01-01

## 2020-01-01 RX ORDER — HYDROXYUREA 500 MG/1
2000 CAPSULE ORAL DAILY
Status: DISCONTINUED | OUTPATIENT
Start: 2020-01-01 | End: 2020-01-01 | Stop reason: HOSPADM

## 2020-01-01 RX ORDER — LIDOCAINE HYDROCHLORIDE 10 MG/ML
INJECTION, SOLUTION INFILTRATION; PERINEURAL
Status: DISPENSED
Start: 2020-01-01 | End: 2020-01-01

## 2020-01-01 RX ORDER — GUAIFENESIN/DEXTROMETHORPHAN 100-10MG/5
5 SYRUP ORAL EVERY 6 HOURS PRN
Status: DISCONTINUED | OUTPATIENT
Start: 2020-01-01 | End: 2020-01-01 | Stop reason: HOSPADM

## 2020-01-01 RX ORDER — IPRATROPIUM BROMIDE AND ALBUTEROL SULFATE 2.5; .5 MG/3ML; MG/3ML
1 SOLUTION RESPIRATORY (INHALATION) 4 TIMES DAILY
Status: DISCONTINUED | OUTPATIENT
Start: 2020-01-01 | End: 2020-01-01 | Stop reason: CLARIF

## 2020-01-01 RX ORDER — LORAZEPAM 0.5 MG/1
0.5 TABLET ORAL EVERY 6 HOURS PRN
Status: DISCONTINUED | OUTPATIENT
Start: 2020-01-01 | End: 2020-01-01

## 2020-01-01 RX ORDER — ALLOPURINOL 100 MG/1
100 TABLET ORAL DAILY
COMMUNITY

## 2020-01-01 RX ORDER — MIDAZOLAM HYDROCHLORIDE 2 MG/2ML
4 INJECTION, SOLUTION INTRAMUSCULAR; INTRAVENOUS ONCE
Status: COMPLETED | OUTPATIENT
Start: 2020-01-01 | End: 2020-01-01

## 2020-01-01 RX ORDER — FUROSEMIDE 10 MG/ML
20 INJECTION INTRAMUSCULAR; INTRAVENOUS ONCE
Status: COMPLETED | OUTPATIENT
Start: 2020-01-01 | End: 2020-01-01

## 2020-01-01 RX ORDER — WARFARIN SODIUM 5 MG/1
5 TABLET ORAL DAILY
Status: DISCONTINUED | OUTPATIENT
Start: 2020-01-01 | End: 2020-01-01 | Stop reason: HOSPADM

## 2020-01-01 RX ORDER — DEXTROSE MONOHYDRATE 50 MG/ML
100 INJECTION, SOLUTION INTRAVENOUS PRN
Status: DISCONTINUED | OUTPATIENT
Start: 2020-01-01 | End: 2020-01-01 | Stop reason: HOSPADM

## 2020-01-01 RX ORDER — ONDANSETRON 2 MG/ML
4 INJECTION INTRAMUSCULAR; INTRAVENOUS ONCE
Status: COMPLETED | OUTPATIENT
Start: 2020-01-01 | End: 2020-01-01

## 2020-01-01 RX ORDER — HYDROXYUREA 500 MG/1
500 CAPSULE ORAL ONCE
Status: COMPLETED | OUTPATIENT
Start: 2020-01-01 | End: 2020-01-01

## 2020-01-01 RX ORDER — NICOTINE POLACRILEX 4 MG
15 LOZENGE BUCCAL PRN
Status: DISCONTINUED | OUTPATIENT
Start: 2020-01-01 | End: 2020-01-01 | Stop reason: HOSPADM

## 2020-01-01 RX ORDER — HYDROXYUREA 500 MG/1
500 CAPSULE ORAL DAILY
Status: DISCONTINUED | OUTPATIENT
Start: 2020-01-01 | End: 2020-01-01

## 2020-01-01 RX ORDER — POTASSIUM CHLORIDE 20 MEQ/1
20 TABLET, EXTENDED RELEASE ORAL ONCE
Status: COMPLETED | OUTPATIENT
Start: 2020-01-01 | End: 2020-01-01

## 2020-01-01 RX ORDER — HYDROXYUREA 500 MG/1
2000 CAPSULE ORAL DAILY
Qty: 120 CAPSULE | Refills: 0 | Status: SHIPPED | OUTPATIENT
Start: 2020-01-01 | End: 2020-01-01

## 2020-01-01 RX ORDER — CLONAZEPAM 0.5 MG/1
0.5 TABLET ORAL 2 TIMES DAILY
Status: DISCONTINUED | OUTPATIENT
Start: 2020-01-01 | End: 2020-01-01 | Stop reason: HOSPADM

## 2020-01-01 RX ORDER — MIDAZOLAM HYDROCHLORIDE 2 MG/2ML
0.5 INJECTION, SOLUTION INTRAMUSCULAR; INTRAVENOUS
Status: DISCONTINUED | OUTPATIENT
Start: 2020-01-01 | End: 2020-01-01 | Stop reason: HOSPADM

## 2020-01-01 RX ORDER — GUAIFENESIN 600 MG/1
600 TABLET, EXTENDED RELEASE ORAL 2 TIMES DAILY
Status: ON HOLD | COMMUNITY
End: 2020-01-01 | Stop reason: HOSPADM

## 2020-01-01 RX ORDER — KETOROLAC TROMETHAMINE 30 MG/ML
15 INJECTION, SOLUTION INTRAMUSCULAR; INTRAVENOUS ONCE
Status: COMPLETED | OUTPATIENT
Start: 2020-01-01 | End: 2020-01-01

## 2020-01-01 RX ORDER — 0.9 % SODIUM CHLORIDE 0.9 %
1000 INTRAVENOUS SOLUTION INTRAVENOUS ONCE
Status: DISCONTINUED | OUTPATIENT
Start: 2020-01-01 | End: 2020-01-01

## 2020-01-01 RX ORDER — HYDROXYUREA 500 MG/1
500 CAPSULE ORAL DAILY
Status: ON HOLD | COMMUNITY
End: 2020-01-01 | Stop reason: HOSPADM

## 2020-01-01 RX ORDER — 0.9 % SODIUM CHLORIDE 0.9 %
30 INTRAVENOUS SOLUTION INTRAVENOUS PRN
Status: DISCONTINUED | OUTPATIENT
Start: 2020-01-01 | End: 2020-01-01 | Stop reason: HOSPADM

## 2020-01-01 RX ORDER — 0.9 % SODIUM CHLORIDE 0.9 %
20 INTRAVENOUS SOLUTION INTRAVENOUS ONCE
Status: COMPLETED | OUTPATIENT
Start: 2020-01-01 | End: 2020-01-01

## 2020-01-01 RX ORDER — ACETAMINOPHEN 325 MG/1
650 TABLET ORAL EVERY 4 HOURS PRN
Status: DISCONTINUED | OUTPATIENT
Start: 2020-01-01 | End: 2020-01-01 | Stop reason: HOSPADM

## 2020-01-01 RX ORDER — SODIUM CHLORIDE 0.9 % (FLUSH) 0.9 %
10 SYRINGE (ML) INJECTION PRN
Status: COMPLETED | OUTPATIENT
Start: 2020-01-01 | End: 2020-01-01

## 2020-01-01 RX ORDER — POLYETHYLENE GLYCOL 3350 17 G/17G
17 POWDER, FOR SOLUTION ORAL DAILY PRN
Status: DISCONTINUED | OUTPATIENT
Start: 2020-01-01 | End: 2020-01-01 | Stop reason: HOSPADM

## 2020-01-01 RX ORDER — CEPHALEXIN 500 MG/1
500 CAPSULE ORAL EVERY 6 HOURS SCHEDULED
Status: COMPLETED | OUTPATIENT
Start: 2020-01-01 | End: 2020-01-01

## 2020-01-01 RX ORDER — RIZATRIPTAN BENZOATE 10 MG/1
10 TABLET ORAL
COMMUNITY

## 2020-01-01 RX ORDER — ACETAMINOPHEN 650 MG/1
650 SUPPOSITORY RECTAL EVERY 6 HOURS PRN
Status: DISCONTINUED | OUTPATIENT
Start: 2020-01-01 | End: 2020-01-01 | Stop reason: HOSPADM

## 2020-01-01 RX ORDER — POLYVINYL ALCOHOL 14 MG/ML
1 SOLUTION/ DROPS OPHTHALMIC EVERY 4 HOURS
Status: DISCONTINUED | OUTPATIENT
Start: 2020-01-01 | End: 2020-01-01

## 2020-01-01 RX ORDER — OXYMETAZOLINE HYDROCHLORIDE 0.05 G/100ML
2 SPRAY NASAL ONCE
Status: DISCONTINUED | OUTPATIENT
Start: 2020-01-01 | End: 2020-01-01 | Stop reason: HOSPADM

## 2020-01-01 RX ORDER — FENTANYL CITRATE 50 UG/ML
25 INJECTION, SOLUTION INTRAMUSCULAR; INTRAVENOUS ONCE
Status: COMPLETED | OUTPATIENT
Start: 2020-01-01 | End: 2020-01-01

## 2020-01-01 RX ORDER — FENTANYL CITRATE 50 UG/ML
100 INJECTION, SOLUTION INTRAMUSCULAR; INTRAVENOUS ONCE
Status: COMPLETED | OUTPATIENT
Start: 2020-01-01 | End: 2020-01-01

## 2020-01-01 RX ORDER — ASPIRIN 81 MG/1
81 TABLET, CHEWABLE ORAL DAILY
Status: DISCONTINUED | OUTPATIENT
Start: 2020-01-01 | End: 2020-01-01 | Stop reason: HOSPADM

## 2020-01-01 RX ORDER — POTASSIUM CHLORIDE 20 MEQ/1
20 TABLET, EXTENDED RELEASE ORAL 2 TIMES DAILY WITH MEALS
Status: DISCONTINUED | OUTPATIENT
Start: 2020-01-01 | End: 2020-01-01

## 2020-01-01 RX ORDER — FUROSEMIDE 40 MG/1
40 TABLET ORAL ONCE
Status: COMPLETED | OUTPATIENT
Start: 2020-01-01 | End: 2020-01-01

## 2020-01-01 RX ORDER — GUAIFENESIN 400 MG/1
400 TABLET ORAL 3 TIMES DAILY
Status: DISCONTINUED | OUTPATIENT
Start: 2020-01-01 | End: 2020-01-01 | Stop reason: HOSPADM

## 2020-01-01 RX ORDER — SODIUM CHLORIDE 0.9 % (FLUSH) 0.9 %
SYRINGE (ML) INJECTION
Status: COMPLETED
Start: 2020-01-01 | End: 2020-01-01

## 2020-01-01 RX ORDER — HEPARIN SODIUM 1000 [USP'U]/ML
80 INJECTION, SOLUTION INTRAVENOUS; SUBCUTANEOUS PRN
Status: DISCONTINUED | OUTPATIENT
Start: 2020-01-01 | End: 2020-01-01 | Stop reason: HOSPADM

## 2020-01-01 RX ORDER — MIDAZOLAM HYDROCHLORIDE 2 MG/2ML
2 INJECTION, SOLUTION INTRAMUSCULAR; INTRAVENOUS EVERY 30 MIN PRN
Status: DISCONTINUED | OUTPATIENT
Start: 2020-01-01 | End: 2020-01-01 | Stop reason: HOSPADM

## 2020-01-01 RX ORDER — MAGNESIUM SULFATE IN WATER 40 MG/ML
4 INJECTION, SOLUTION INTRAVENOUS ONCE
Status: COMPLETED | OUTPATIENT
Start: 2020-01-01 | End: 2020-01-01

## 2020-01-01 RX ORDER — PREDNISONE 1 MG/1
10 TABLET ORAL DAILY
Status: DISCONTINUED | OUTPATIENT
Start: 2020-01-01 | End: 2020-01-01

## 2020-01-01 RX ORDER — PHENOBARBITAL SODIUM 65 MG/ML
65 INJECTION INTRAMUSCULAR EVERY 8 HOURS SCHEDULED
Status: DISCONTINUED | OUTPATIENT
Start: 2020-01-01 | End: 2020-01-01 | Stop reason: HOSPADM

## 2020-01-01 RX ORDER — HEPARIN SODIUM 1000 [USP'U]/ML
40 INJECTION, SOLUTION INTRAVENOUS; SUBCUTANEOUS PRN
Status: DISCONTINUED | OUTPATIENT
Start: 2020-01-01 | End: 2020-01-01 | Stop reason: HOSPADM

## 2020-01-01 RX ORDER — PHENYLEPHRINE HYDROCHLORIDE 10 MG/ML
INJECTION INTRAVENOUS
Status: COMPLETED
Start: 2020-01-01 | End: 2020-01-01

## 2020-01-01 RX ORDER — PROPOFOL 10 MG/ML
10 INJECTION, EMULSION INTRAVENOUS
Status: DISCONTINUED | OUTPATIENT
Start: 2020-01-01 | End: 2020-01-01

## 2020-01-01 RX ORDER — IPRATROPIUM BROMIDE AND ALBUTEROL SULFATE 2.5; .5 MG/3ML; MG/3ML
1 SOLUTION RESPIRATORY (INHALATION)
Status: DISCONTINUED | OUTPATIENT
Start: 2020-01-01 | End: 2020-01-01 | Stop reason: HOSPADM

## 2020-01-01 RX ORDER — METHYLPREDNISOLONE SODIUM SUCCINATE 40 MG/ML
40 INJECTION, POWDER, LYOPHILIZED, FOR SOLUTION INTRAMUSCULAR; INTRAVENOUS DAILY
Status: DISCONTINUED | OUTPATIENT
Start: 2020-01-01 | End: 2020-01-01 | Stop reason: HOSPADM

## 2020-01-01 RX ORDER — PROPOFOL 10 MG/ML
10 INJECTION, EMULSION INTRAVENOUS CONTINUOUS
Status: DISCONTINUED | OUTPATIENT
Start: 2020-01-01 | End: 2020-01-01 | Stop reason: HOSPADM

## 2020-01-01 RX ORDER — OXYMETAZOLINE HYDROCHLORIDE 0.05 G/100ML
SPRAY NASAL
Status: DISCONTINUED
Start: 2020-01-01 | End: 2020-01-01 | Stop reason: HOSPADM

## 2020-01-01 RX ORDER — HYDROXYUREA 500 MG/1
1000 CAPSULE ORAL 2 TIMES DAILY
COMMUNITY

## 2020-01-01 RX ORDER — HYDROXYUREA 500 MG/1
1000 CAPSULE ORAL DAILY
Status: DISCONTINUED | OUTPATIENT
Start: 2020-01-01 | End: 2020-01-01 | Stop reason: HOSPADM

## 2020-01-01 RX ORDER — LEVETIRACETAM 10 MG/ML
INJECTION INTRAVASCULAR
Status: COMPLETED
Start: 2020-01-01 | End: 2020-01-01

## 2020-01-01 RX ORDER — PROMETHAZINE HYDROCHLORIDE 25 MG/ML
25 INJECTION, SOLUTION INTRAMUSCULAR; INTRAVENOUS ONCE
Status: COMPLETED | OUTPATIENT
Start: 2020-01-01 | End: 2020-01-01

## 2020-01-01 RX ORDER — ASPIRIN 81 MG/1
81 TABLET, CHEWABLE ORAL DAILY
Qty: 30 TABLET | Refills: 3 | Status: SHIPPED | OUTPATIENT
Start: 2020-01-01 | End: 2020-01-01

## 2020-01-01 RX ORDER — CEPHALEXIN 500 MG/1
500 CAPSULE ORAL 3 TIMES DAILY
Qty: 15 CAPSULE | Refills: 0 | Status: SHIPPED | OUTPATIENT
Start: 2020-01-01 | End: 2020-01-01

## 2020-01-01 RX ADMIN — DEXTROSE MONOHYDRATE 2 MG/HR: 5 INJECTION, SOLUTION INTRAVENOUS at 22:07

## 2020-01-01 RX ADMIN — CEPHALEXIN 500 MG: 500 CAPSULE ORAL at 06:33

## 2020-01-01 RX ADMIN — SODIUM CHLORIDE, PRESERVATIVE FREE: 5 INJECTION INTRAVENOUS at 10:07

## 2020-01-01 RX ADMIN — HYDROXYUREA 1000 MG: 500 CAPSULE ORAL at 08:39

## 2020-01-01 RX ADMIN — ERGOCALCIFEROL 50000 UNITS: 1.25 CAPSULE ORAL at 09:10

## 2020-01-01 RX ADMIN — Medication 50 MEQ: at 23:42

## 2020-01-01 RX ADMIN — FUROSEMIDE 20 MG: 10 INJECTION, SOLUTION INTRAVENOUS at 08:34

## 2020-01-01 RX ADMIN — SODIUM CHLORIDE, PRESERVATIVE FREE 10 ML: 5 INJECTION INTRAVENOUS at 10:50

## 2020-01-01 RX ADMIN — DEXAMETHASONE 6 MG: 4 TABLET ORAL at 22:17

## 2020-01-01 RX ADMIN — POTASSIUM CHLORIDE 20 MEQ: 20 TABLET, EXTENDED RELEASE ORAL at 06:56

## 2020-01-01 RX ADMIN — CALCIUM GLUCONATE 2 G: 98 INJECTION, SOLUTION INTRAVENOUS at 19:21

## 2020-01-01 RX ADMIN — HYDROXYUREA 2000 MG: 500 CAPSULE ORAL at 10:52

## 2020-01-01 RX ADMIN — LEVOFLOXACIN 750 MG: 500 TABLET, FILM COATED ORAL at 22:19

## 2020-01-01 RX ADMIN — Medication 1 CAPSULE: at 10:50

## 2020-01-01 RX ADMIN — Medication 1 CAPSULE: at 09:11

## 2020-01-01 RX ADMIN — FLUDEOXYGLUCOSE F 18 15 MILLICURIE: 200 INJECTION, SOLUTION INTRAVENOUS at 10:08

## 2020-01-01 RX ADMIN — PIPERACILLIN AND TAZOBACTAM 3.38 G: 3; .375 INJECTION, POWDER, LYOPHILIZED, FOR SOLUTION INTRAVENOUS at 04:39

## 2020-01-01 RX ADMIN — FUROSEMIDE 40 MG: 40 TABLET ORAL at 06:56

## 2020-01-01 RX ADMIN — PREDNISONE 10 MG: 10 TABLET ORAL at 09:12

## 2020-01-01 RX ADMIN — REMDESIVIR 200 MG: 100 INJECTION, POWDER, LYOPHILIZED, FOR SOLUTION INTRAVENOUS at 19:00

## 2020-01-01 RX ADMIN — DEXAMETHASONE 6 MG: 4 TABLET ORAL at 09:49

## 2020-01-01 RX ADMIN — HYDROCODONE BITARTRATE AND ACETAMINOPHEN 1 TABLET: 5; 325 TABLET ORAL at 23:48

## 2020-01-01 RX ADMIN — PIPERACILLIN AND TAZOBACTAM 3.38 G: 3; .375 INJECTION, POWDER, LYOPHILIZED, FOR SOLUTION INTRAVENOUS at 12:05

## 2020-01-01 RX ADMIN — SERTRALINE HYDROCHLORIDE 25 MG: 50 TABLET ORAL at 19:00

## 2020-01-01 RX ADMIN — SODIUM CHLORIDE, PRESERVATIVE FREE 10 ML: 5 INJECTION INTRAVENOUS at 09:00

## 2020-01-01 RX ADMIN — SODIUM BICARBONATE: 84 INJECTION, SOLUTION INTRAVENOUS at 11:34

## 2020-01-01 RX ADMIN — APIXABAN 5 MG: 5 TABLET, FILM COATED ORAL at 09:22

## 2020-01-01 RX ADMIN — SODIUM CHLORIDE, PRESERVATIVE FREE 10 ML: 5 INJECTION INTRAVENOUS at 19:59

## 2020-01-01 RX ADMIN — Medication 100 MEQ: at 02:58

## 2020-01-01 RX ADMIN — METOPROLOL TARTRATE 25 MG: 25 TABLET, FILM COATED ORAL at 08:30

## 2020-01-01 RX ADMIN — HYDROXYUREA 1500 MG: 500 CAPSULE ORAL at 09:00

## 2020-01-01 RX ADMIN — LEVETIRACETAM: 10 INJECTION, SOLUTION INTRAVENOUS at 11:20

## 2020-01-01 RX ADMIN — REMDESIVIR 100 MG: 100 INJECTION, POWDER, LYOPHILIZED, FOR SOLUTION INTRAVENOUS at 00:04

## 2020-01-01 RX ADMIN — PREDNISONE 10 MG: 10 TABLET ORAL at 08:55

## 2020-01-01 RX ADMIN — POTASSIUM CHLORIDE 20 MEQ: 20 TABLET, EXTENDED RELEASE ORAL at 09:36

## 2020-01-01 RX ADMIN — CHLORHEXIDINE GLUCONATE 15 ML: 1.2 RINSE ORAL at 20:31

## 2020-01-01 RX ADMIN — CHOLECALCIFEROL TAB 50 MCG (2000 UNIT) 2000 UNITS: 50 TAB at 08:56

## 2020-01-01 RX ADMIN — SODIUM CHLORIDE: 9 INJECTION, SOLUTION INTRAVENOUS at 03:52

## 2020-01-01 RX ADMIN — FUROSEMIDE 20 MG: 10 INJECTION, SOLUTION INTRAVENOUS at 09:36

## 2020-01-01 RX ADMIN — HYDROXYUREA 500 MG: 500 CAPSULE ORAL at 09:36

## 2020-01-01 RX ADMIN — LEVETIRACETAM 1000 MG: 10 INJECTION INTRAVENOUS at 08:51

## 2020-01-01 RX ADMIN — MIDAZOLAM 0.5 MG: 1 INJECTION INTRAMUSCULAR; INTRAVENOUS at 16:12

## 2020-01-01 RX ADMIN — HYDROCORTISONE SODIUM SUCCINATE 100 MG: 100 INJECTION, POWDER, FOR SOLUTION INTRAMUSCULAR; INTRAVENOUS at 18:34

## 2020-01-01 RX ADMIN — GUAIFENESIN 400 MG: 400 TABLET ORAL at 14:11

## 2020-01-01 RX ADMIN — CLONAZEPAM 0.5 MG: 0.5 TABLET ORAL at 08:29

## 2020-01-01 RX ADMIN — METOPROLOL TARTRATE 25 MG: 25 TABLET, FILM COATED ORAL at 09:22

## 2020-01-01 RX ADMIN — LORAZEPAM 0.5 MG: 0.5 TABLET ORAL at 21:25

## 2020-01-01 RX ADMIN — IOPAMIDOL 70 ML: 755 INJECTION, SOLUTION INTRAVENOUS at 14:37

## 2020-01-01 RX ADMIN — KETOROLAC TROMETHAMINE 15 MG: 30 INJECTION, SOLUTION INTRAMUSCULAR at 09:54

## 2020-01-01 RX ADMIN — SODIUM BICARBONATE: 84 INJECTION, SOLUTION INTRAVENOUS at 19:21

## 2020-01-01 RX ADMIN — Medication 1 CAPSULE: at 09:22

## 2020-01-01 RX ADMIN — METOPROLOL TARTRATE 25 MG: 25 TABLET, FILM COATED ORAL at 00:03

## 2020-01-01 RX ADMIN — SODIUM BICARBONATE: 84 INJECTION, SOLUTION INTRAVENOUS at 11:54

## 2020-01-01 RX ADMIN — PHENYLEPHRINE HYDROCHLORIDE 1 DROP: 1 SPRAY NASAL at 08:51

## 2020-01-01 RX ADMIN — SERTRALINE HYDROCHLORIDE 25 MG: 25 TABLET, FILM COATED ORAL at 22:13

## 2020-01-01 RX ADMIN — SODIUM CHLORIDE: 9 INJECTION, SOLUTION INTRAVENOUS at 09:27

## 2020-01-01 RX ADMIN — FENTANYL CITRATE 25 MCG: 50 INJECTION, SOLUTION INTRAMUSCULAR; INTRAVENOUS at 14:58

## 2020-01-01 RX ADMIN — SODIUM CHLORIDE: 9 INJECTION, SOLUTION INTRAVENOUS at 16:17

## 2020-01-01 RX ADMIN — GUAIFENESIN 400 MG: 400 TABLET ORAL at 00:03

## 2020-01-01 RX ADMIN — SODIUM CHLORIDE, PRESERVATIVE FREE 10 ML: 5 INJECTION INTRAVENOUS at 21:03

## 2020-01-01 RX ADMIN — MINERAL OIL AND PETROLATUM: 150; 830 OINTMENT OPHTHALMIC at 02:42

## 2020-01-01 RX ADMIN — HEPARIN SODIUM 6530 UNITS: 1000 INJECTION INTRAVENOUS; SUBCUTANEOUS at 10:34

## 2020-01-01 RX ADMIN — SODIUM BICARBONATE: 84 INJECTION, SOLUTION INTRAVENOUS at 22:35

## 2020-01-01 RX ADMIN — GUAIFENESIN AND DEXTROMETHORPHAN 5 ML: 100; 10 SYRUP ORAL at 15:39

## 2020-01-01 RX ADMIN — GUAIFENESIN 400 MG: 400 TABLET ORAL at 14:43

## 2020-01-01 RX ADMIN — MINERAL OIL AND PETROLATUM: 150; 830 OINTMENT OPHTHALMIC at 05:02

## 2020-01-01 RX ADMIN — SODIUM CHLORIDE 1000 ML: 9 INJECTION, SOLUTION INTRAVENOUS at 11:00

## 2020-01-01 RX ADMIN — SODIUM CHLORIDE 20 ML: 9 INJECTION, SOLUTION INTRAVENOUS at 04:37

## 2020-01-01 RX ADMIN — METOPROLOL TARTRATE 25 MG: 25 TABLET, FILM COATED ORAL at 09:51

## 2020-01-01 RX ADMIN — WARFARIN SODIUM 5 MG: 5 TABLET ORAL at 17:29

## 2020-01-01 RX ADMIN — Medication 100 MEQ: at 04:41

## 2020-01-01 RX ADMIN — CHLORHEXIDINE GLUCONATE 15 ML: 1.2 RINSE ORAL at 08:52

## 2020-01-01 RX ADMIN — GUAIFENESIN 400 MG: 400 TABLET ORAL at 15:30

## 2020-01-01 RX ADMIN — VASOPRESSIN 0.04 UNITS/MIN: 20 INJECTION INTRAVENOUS at 08:21

## 2020-01-01 RX ADMIN — HYDROXYUREA 1000 MG: 500 CAPSULE ORAL at 09:00

## 2020-01-01 RX ADMIN — METOPROLOL TARTRATE 25 MG: 25 TABLET, FILM COATED ORAL at 22:40

## 2020-01-01 RX ADMIN — PHENYLEPHRINE HYDROCHLORIDE 100 MCG/MIN: 10 INJECTION INTRAVENOUS at 01:09

## 2020-01-01 RX ADMIN — CISATRACURIUM BESYLATE 2 MCG/KG/MIN: 10 INJECTION INTRAVENOUS at 08:09

## 2020-01-01 RX ADMIN — HYDROXYUREA 500 MG: 500 CAPSULE ORAL at 11:49

## 2020-01-01 RX ADMIN — Medication 1 CAPSULE: at 09:10

## 2020-01-01 RX ADMIN — HYDROXYUREA 1500 MG: 500 CAPSULE ORAL at 09:16

## 2020-01-01 RX ADMIN — METHYLPREDNISOLONE SODIUM SUCCINATE 40 MG: 40 INJECTION, POWDER, LYOPHILIZED, FOR SOLUTION INTRAMUSCULAR; INTRAVENOUS at 09:09

## 2020-01-01 RX ADMIN — ONDANSETRON 4 MG: 2 INJECTION INTRAMUSCULAR; INTRAVENOUS at 09:54

## 2020-01-01 RX ADMIN — METHYLPREDNISOLONE SODIUM SUCCINATE 40 MG: 40 INJECTION, POWDER, LYOPHILIZED, FOR SOLUTION INTRAMUSCULAR; INTRAVENOUS at 10:50

## 2020-01-01 RX ADMIN — HEPARIN SODIUM 18 UNITS/KG/HR: 10000 INJECTION, SOLUTION INTRAVENOUS at 10:36

## 2020-01-01 RX ADMIN — ACETAMINOPHEN 1000 MG: 500 TABLET ORAL at 18:05

## 2020-01-01 RX ADMIN — WATER 10 ML: 1 INJECTION INTRAMUSCULAR; INTRAVENOUS; SUBCUTANEOUS at 05:26

## 2020-01-01 RX ADMIN — PANTOPRAZOLE SODIUM 40 MG: 40 INJECTION, POWDER, FOR SOLUTION INTRAVENOUS at 08:52

## 2020-01-01 RX ADMIN — PHENYLEPHRINE HYDROCHLORIDE 200 MCG/MIN: 10 INJECTION INTRAVENOUS at 07:42

## 2020-01-01 RX ADMIN — SERTRALINE HYDROCHLORIDE 25 MG: 25 TABLET, FILM COATED ORAL at 21:03

## 2020-01-01 RX ADMIN — GUAIFENESIN 400 MG: 400 TABLET ORAL at 22:41

## 2020-01-01 RX ADMIN — Medication 2000 UNITS: at 08:30

## 2020-01-01 RX ADMIN — GUAIFENESIN 400 MG: 400 TABLET ORAL at 20:30

## 2020-01-01 RX ADMIN — SERTRALINE HYDROCHLORIDE 25 MG: 50 TABLET ORAL at 00:03

## 2020-01-01 RX ADMIN — LEVETIRACETAM 1000 MG: 10 INJECTION INTRAVENOUS at 22:34

## 2020-01-01 RX ADMIN — Medication 150 MEQ: at 19:35

## 2020-01-01 RX ADMIN — Medication 30 MCG/MIN: at 14:14

## 2020-01-01 RX ADMIN — ERGOCALCIFEROL 50000 UNITS: 1.25 CAPSULE ORAL at 09:36

## 2020-01-01 RX ADMIN — PHENYLEPHRINE HYDROCHLORIDE 100 MCG/MIN: 10 INJECTION INTRAVENOUS at 17:22

## 2020-01-01 RX ADMIN — HYDROCODONE BITARTRATE AND ACETAMINOPHEN 1 TABLET: 5; 325 TABLET ORAL at 04:19

## 2020-01-01 RX ADMIN — SERTRALINE HYDROCHLORIDE 25 MG: 50 TABLET ORAL at 22:41

## 2020-01-01 RX ADMIN — SODIUM CHLORIDE 1000 ML: 9 INJECTION, SOLUTION INTRAVENOUS at 18:00

## 2020-01-01 RX ADMIN — HYDROCORTISONE SODIUM SUCCINATE 100 MG: 100 INJECTION, POWDER, FOR SOLUTION INTRAMUSCULAR; INTRAVENOUS at 02:43

## 2020-01-01 RX ADMIN — POLYVINYL ALCOHOL 1 DROP: 14 SOLUTION/ DROPS OPHTHALMIC at 04:51

## 2020-01-01 RX ADMIN — Medication: at 08:55

## 2020-01-01 RX ADMIN — DEXAMETHASONE 6 MG: 4 TABLET ORAL at 08:30

## 2020-01-01 RX ADMIN — MIDAZOLAM 4 MG: 1 INJECTION INTRAMUSCULAR; INTRAVENOUS at 11:50

## 2020-01-01 RX ADMIN — MAGNESIUM SULFATE HEPTAHYDRATE 4 G: 40 INJECTION, SOLUTION INTRAVENOUS at 19:29

## 2020-01-01 RX ADMIN — Medication 10 ML: at 14:35

## 2020-01-01 RX ADMIN — WARFARIN SODIUM 5 MG: 5 TABLET ORAL at 17:52

## 2020-01-01 RX ADMIN — PHENYLEPHRINE HYDROCHLORIDE 10 MG: 10 INJECTION INTRAVENOUS at 17:22

## 2020-01-01 RX ADMIN — SODIUM CHLORIDE, PRESERVATIVE FREE 10 ML: 5 INJECTION INTRAVENOUS at 21:21

## 2020-01-01 RX ADMIN — SERTRALINE HYDROCHLORIDE 25 MG: 25 TABLET, FILM COATED ORAL at 21:25

## 2020-01-01 RX ADMIN — MINERAL OIL AND PETROLATUM: 150; 830 OINTMENT OPHTHALMIC at 20:35

## 2020-01-01 RX ADMIN — HYDROCODONE BITARTRATE AND ACETAMINOPHEN 1 TABLET: 5; 325 TABLET ORAL at 03:05

## 2020-01-01 RX ADMIN — HEPARIN SODIUM 19 UNITS/KG/HR: 10000 INJECTION, SOLUTION INTRAVENOUS at 16:41

## 2020-01-01 RX ADMIN — Medication 2000 UNITS: at 09:30

## 2020-01-01 RX ADMIN — SODIUM CHLORIDE 1000 ML: 9 INJECTION, SOLUTION INTRAVENOUS at 14:47

## 2020-01-01 RX ADMIN — DEXAMETHASONE 6 MG: 4 TABLET ORAL at 09:22

## 2020-01-01 RX ADMIN — IOPAMIDOL 75 ML: 755 INJECTION, SOLUTION INTRAVENOUS at 10:57

## 2020-01-01 RX ADMIN — HEPARIN SODIUM 18 UNITS/KG/HR: 10000 INJECTION, SOLUTION INTRAVENOUS at 03:16

## 2020-01-01 RX ADMIN — HYDROCODONE BITARTRATE AND ACETAMINOPHEN 1 TABLET: 5; 325 TABLET ORAL at 13:31

## 2020-01-01 RX ADMIN — HYDROXYUREA 1000 MG: 500 CAPSULE ORAL at 08:52

## 2020-01-01 RX ADMIN — PHENOBARBITAL SODIUM 65 MG: 65 INJECTION INTRAMUSCULAR at 05:26

## 2020-01-01 RX ADMIN — SODIUM CHLORIDE, PRESERVATIVE FREE 10 ML: 5 INJECTION INTRAVENOUS at 09:41

## 2020-01-01 RX ADMIN — HYDROCODONE BITARTRATE AND ACETAMINOPHEN 1 TABLET: 5; 325 TABLET ORAL at 23:30

## 2020-01-01 RX ADMIN — Medication 1 CAPSULE: at 09:50

## 2020-01-01 RX ADMIN — SODIUM CHLORIDE, PRESERVATIVE FREE 10 ML: 5 INJECTION INTRAVENOUS at 20:04

## 2020-01-01 RX ADMIN — HEPARIN SODIUM 19 UNITS/KG/HR: 10000 INJECTION, SOLUTION INTRAVENOUS at 14:09

## 2020-01-01 RX ADMIN — METOPROLOL TARTRATE 25 MG: 25 TABLET, FILM COATED ORAL at 22:18

## 2020-01-01 RX ADMIN — DEXAMETHASONE 6 MG: 4 TABLET ORAL at 00:04

## 2020-01-01 RX ADMIN — GUAIFENESIN 400 MG: 400 TABLET ORAL at 09:30

## 2020-01-01 RX ADMIN — VANCOMYCIN HYDROCHLORIDE 1250 MG: 10 INJECTION, POWDER, LYOPHILIZED, FOR SOLUTION INTRAVENOUS at 13:06

## 2020-01-01 RX ADMIN — GUAIFENESIN AND DEXTROMETHORPHAN 5 ML: 100; 10 SYRUP ORAL at 13:37

## 2020-01-01 RX ADMIN — PREDNISONE 10 MG: 10 TABLET ORAL at 09:37

## 2020-01-01 RX ADMIN — VASOPRESSIN 0.04 UNITS/MIN: 20 INJECTION INTRAVENOUS at 17:13

## 2020-01-01 RX ADMIN — Medication 1 CAPSULE: at 08:30

## 2020-01-01 RX ADMIN — HYDROCODONE BITARTRATE AND ACETAMINOPHEN 1 TABLET: 5; 325 TABLET ORAL at 08:21

## 2020-01-01 RX ADMIN — HYDROXYUREA 1000 MG: 500 CAPSULE ORAL at 09:12

## 2020-01-01 RX ADMIN — APIXABAN 5 MG: 5 TABLET, FILM COATED ORAL at 09:30

## 2020-01-01 RX ADMIN — SODIUM BICARBONATE: 84 INJECTION, SOLUTION INTRAVENOUS at 11:03

## 2020-01-01 RX ADMIN — Medication 1 CAPSULE: at 08:21

## 2020-01-01 RX ADMIN — HYDROCODONE BITARTRATE AND ACETAMINOPHEN 1 TABLET: 5; 325 TABLET ORAL at 22:18

## 2020-01-01 RX ADMIN — SODIUM CHLORIDE, PRESERVATIVE FREE 10 ML: 5 INJECTION INTRAVENOUS at 09:19

## 2020-01-01 RX ADMIN — CHOLECALCIFEROL TAB 50 MCG (2000 UNIT) 2000 UNITS: 50 TAB at 09:10

## 2020-01-01 RX ADMIN — PREDNISONE 10 MG: 10 TABLET ORAL at 09:36

## 2020-01-01 RX ADMIN — GUAIFENESIN 400 MG: 400 TABLET ORAL at 09:23

## 2020-01-01 RX ADMIN — APIXABAN 5 MG: 5 TABLET, FILM COATED ORAL at 22:21

## 2020-01-01 RX ADMIN — MIDAZOLAM HYDROCHLORIDE 2 MG: 1 INJECTION, SOLUTION INTRAMUSCULAR; INTRAVENOUS at 11:10

## 2020-01-01 RX ADMIN — CHOLECALCIFEROL TAB 50 MCG (2000 UNIT) 2000 UNITS: 50 TAB at 09:17

## 2020-01-01 RX ADMIN — REMDESIVIR 100 MG: 100 INJECTION, POWDER, LYOPHILIZED, FOR SOLUTION INTRAVENOUS at 22:39

## 2020-01-01 RX ADMIN — DEXAMETHASONE 6 MG: 4 TABLET ORAL at 09:30

## 2020-01-01 RX ADMIN — PREDNISONE 10 MG: 10 TABLET ORAL at 09:18

## 2020-01-01 RX ADMIN — POLYVINYL ALCOHOL 1 DROP: 14 SOLUTION/ DROPS OPHTHALMIC at 23:41

## 2020-01-01 RX ADMIN — CEFTRIAXONE 1 G: 1 INJECTION, POWDER, FOR SOLUTION INTRAMUSCULAR; INTRAVENOUS at 16:14

## 2020-01-01 RX ADMIN — HYDROXYUREA 500 MG: 500 CAPSULE ORAL at 22:14

## 2020-01-01 RX ADMIN — SODIUM BICARBONATE: 84 INJECTION, SOLUTION INTRAVENOUS at 01:08

## 2020-01-01 RX ADMIN — METRONIDAZOLE 500 MG: 500 INJECTION, SOLUTION INTRAVENOUS at 16:15

## 2020-01-01 RX ADMIN — WARFARIN SODIUM 5 MG: 5 TABLET ORAL at 16:52

## 2020-01-01 RX ADMIN — ACETAMINOPHEN 650 MG: 325 TABLET ORAL at 09:30

## 2020-01-01 RX ADMIN — SERTRALINE HYDROCHLORIDE 25 MG: 25 TABLET, FILM COATED ORAL at 21:05

## 2020-01-01 RX ADMIN — SODIUM BICARBONATE: 84 INJECTION, SOLUTION INTRAVENOUS at 02:54

## 2020-01-01 RX ADMIN — SODIUM CHLORIDE: 9 INJECTION, SOLUTION INTRAVENOUS at 19:58

## 2020-01-01 RX ADMIN — MINERAL OIL AND PETROLATUM: 150; 830 OINTMENT OPHTHALMIC at 16:55

## 2020-01-01 RX ADMIN — PANTOPRAZOLE SODIUM 40 MG: 40 INJECTION, POWDER, FOR SOLUTION INTRAVENOUS at 16:54

## 2020-01-01 RX ADMIN — INSULIN LISPRO 3 UNITS: 100 INJECTION, SOLUTION INTRAVENOUS; SUBCUTANEOUS at 12:05

## 2020-01-01 RX ADMIN — ALBUMIN (HUMAN) 25 G: 0.25 INJECTION, SOLUTION INTRAVENOUS at 05:26

## 2020-01-01 RX ADMIN — WARFARIN SODIUM 5 MG: 5 TABLET ORAL at 18:45

## 2020-01-01 RX ADMIN — ACETAMINOPHEN 650 MG: 650 SUPPOSITORY RECTAL at 20:31

## 2020-01-01 RX ADMIN — SODIUM CHLORIDE: 9 INJECTION, SOLUTION INTRAVENOUS at 05:15

## 2020-01-01 RX ADMIN — MAGNESIUM SULFATE HEPTAHYDRATE 2 G: 40 INJECTION, SOLUTION INTRAVENOUS at 09:46

## 2020-01-01 RX ADMIN — Medication 2000 UNITS: at 09:22

## 2020-01-01 RX ADMIN — SODIUM CHLORIDE: 9 INJECTION, SOLUTION INTRAVENOUS at 03:05

## 2020-01-01 RX ADMIN — CHOLECALCIFEROL TAB 50 MCG (2000 UNIT) 2000 UNITS: 50 TAB at 09:11

## 2020-01-01 RX ADMIN — HEPARIN SODIUM 6530 UNITS: 1000 INJECTION INTRAVENOUS; SUBCUTANEOUS at 05:17

## 2020-01-01 RX ADMIN — SERTRALINE HYDROCHLORIDE 25 MG: 25 TABLET, FILM COATED ORAL at 20:04

## 2020-01-01 RX ADMIN — SODIUM CHLORIDE, PRESERVATIVE FREE 10 ML: 5 INJECTION INTRAVENOUS at 08:52

## 2020-01-01 RX ADMIN — CHOLECALCIFEROL TAB 50 MCG (2000 UNIT) 2000 UNITS: 50 TAB at 09:37

## 2020-01-01 RX ADMIN — SODIUM CHLORIDE, PRESERVATIVE FREE 10 ML: 5 INJECTION INTRAVENOUS at 09:12

## 2020-01-01 RX ADMIN — Medication 40 MCG/MIN: at 19:20

## 2020-01-01 RX ADMIN — SERTRALINE HYDROCHLORIDE 25 MG: 25 TABLET, FILM COATED ORAL at 19:55

## 2020-01-01 RX ADMIN — POLYVINYL ALCOHOL 1 DROP: 14 SOLUTION/ DROPS OPHTHALMIC at 20:34

## 2020-01-01 RX ADMIN — SODIUM CHLORIDE, PRESERVATIVE FREE 10 ML: 5 INJECTION INTRAVENOUS at 16:54

## 2020-01-01 RX ADMIN — HYDROCODONE BITARTRATE AND ACETAMINOPHEN 1 TABLET: 5; 325 TABLET ORAL at 14:43

## 2020-01-01 RX ADMIN — HYDROCODONE BITARTRATE AND ACETAMINOPHEN 1 TABLET: 5; 325 TABLET ORAL at 00:03

## 2020-01-01 RX ADMIN — POTASSIUM BICARBONATE 20 MEQ: 782 TABLET, EFFERVESCENT ORAL at 08:30

## 2020-01-01 RX ADMIN — HYDROXYUREA 2000 MG: 500 CAPSULE ORAL at 09:37

## 2020-01-01 RX ADMIN — Medication 2000 UNITS: at 08:21

## 2020-01-01 RX ADMIN — SERTRALINE HYDROCHLORIDE 25 MG: 50 TABLET ORAL at 20:31

## 2020-01-01 RX ADMIN — POTASSIUM CHLORIDE 20 MEQ: 20 TABLET, EXTENDED RELEASE ORAL at 16:52

## 2020-01-01 RX ADMIN — Medication 1 CAPSULE: at 09:30

## 2020-01-01 RX ADMIN — Medication 10 MCG/MIN: at 10:23

## 2020-01-01 RX ADMIN — SODIUM CHLORIDE, PRESERVATIVE FREE 10 ML: 5 INJECTION INTRAVENOUS at 21:05

## 2020-01-01 RX ADMIN — Medication 10 ML: at 09:21

## 2020-01-01 RX ADMIN — HYDROCODONE BITARTRATE AND ACETAMINOPHEN 1 TABLET: 5; 325 TABLET ORAL at 16:28

## 2020-01-01 RX ADMIN — VANCOMYCIN HYDROCHLORIDE 750 MG: 10 INJECTION, POWDER, LYOPHILIZED, FOR SOLUTION INTRAVENOUS at 09:08

## 2020-01-01 RX ADMIN — SODIUM CHLORIDE, PRESERVATIVE FREE 10 ML: 5 INJECTION INTRAVENOUS at 21:25

## 2020-01-01 RX ADMIN — GUAIFENESIN 400 MG: 400 TABLET ORAL at 08:21

## 2020-01-01 RX ADMIN — CEPHALEXIN 500 MG: 500 CAPSULE ORAL at 11:49

## 2020-01-01 RX ADMIN — METOPROLOL TARTRATE 25 MG: 25 TABLET, FILM COATED ORAL at 10:16

## 2020-01-01 RX ADMIN — SODIUM BICARBONATE: 84 INJECTION, SOLUTION INTRAVENOUS at 17:34

## 2020-01-01 RX ADMIN — CHOLECALCIFEROL TAB 50 MCG (2000 UNIT) 2000 UNITS: 50 TAB at 10:50

## 2020-01-01 RX ADMIN — APIXABAN 5 MG: 5 TABLET, FILM COATED ORAL at 08:30

## 2020-01-01 RX ADMIN — HYDROXYUREA 1000 MG: 500 CAPSULE ORAL at 10:37

## 2020-01-01 RX ADMIN — PIPERACILLIN AND TAZOBACTAM 3.38 G: 3; .375 INJECTION, POWDER, LYOPHILIZED, FOR SOLUTION INTRAVENOUS at 12:27

## 2020-01-01 RX ADMIN — GUAIFENESIN 400 MG: 400 TABLET ORAL at 09:50

## 2020-01-01 RX ADMIN — Medication 40 MCG/MIN: at 01:07

## 2020-01-01 RX ADMIN — WARFARIN SODIUM 5 MG: 5 TABLET ORAL at 22:13

## 2020-01-01 RX ADMIN — GUAIFENESIN 400 MG: 400 TABLET ORAL at 08:30

## 2020-01-01 RX ADMIN — SODIUM CHLORIDE: 9 INJECTION, SOLUTION INTRAVENOUS at 10:34

## 2020-01-01 RX ADMIN — SODIUM CHLORIDE, PRESERVATIVE FREE 10 ML: 5 INJECTION INTRAVENOUS at 09:11

## 2020-01-01 RX ADMIN — FENTANYL CITRATE 100 MCG: 50 INJECTION, SOLUTION INTRAMUSCULAR; INTRAVENOUS at 11:10

## 2020-01-01 RX ADMIN — SODIUM CHLORIDE 1000 ML: 9 INJECTION, SOLUTION INTRAVENOUS at 17:30

## 2020-01-01 RX ADMIN — SODIUM CHLORIDE 1000 ML: 9 INJECTION, SOLUTION INTRAVENOUS at 11:51

## 2020-01-01 RX ADMIN — Medication 1 CAPSULE: at 08:52

## 2020-01-01 RX ADMIN — HYDROCODONE BITARTRATE AND ACETAMINOPHEN 1 TABLET: 5; 325 TABLET ORAL at 09:51

## 2020-01-01 RX ADMIN — APIXABAN 5 MG: 5 TABLET, FILM COATED ORAL at 20:30

## 2020-01-01 RX ADMIN — HYDROCODONE BITARTRATE AND ACETAMINOPHEN 1 TABLET: 5; 325 TABLET ORAL at 10:50

## 2020-01-01 RX ADMIN — DEXTROSE MONOHYDRATE 12.5 G: 25 INJECTION, SOLUTION INTRAVENOUS at 18:34

## 2020-01-01 RX ADMIN — PIPERACILLIN AND TAZOBACTAM 3.38 G: 3; .375 INJECTION, POWDER, LYOPHILIZED, FOR SOLUTION INTRAVENOUS at 20:32

## 2020-01-01 RX ADMIN — Medication 1 CAPSULE: at 09:37

## 2020-01-01 RX ADMIN — GLYCOPYRROLATE 0.2 MG: 0.2 INJECTION, SOLUTION INTRAMUSCULAR; INTRAVENOUS at 15:09

## 2020-01-01 RX ADMIN — PROPOFOL 5 MCG/KG/MIN: 10 INJECTION, EMULSION INTRAVENOUS at 12:28

## 2020-01-01 RX ADMIN — WARFARIN SODIUM 5 MG: 5 TABLET ORAL at 18:46

## 2020-01-01 RX ADMIN — SODIUM CHLORIDE, PRESERVATIVE FREE 10 ML: 5 INJECTION INTRAVENOUS at 21:19

## 2020-01-01 RX ADMIN — SODIUM CHLORIDE: 9 INJECTION, SOLUTION INTRAVENOUS at 23:11

## 2020-01-01 RX ADMIN — GUAIFENESIN 400 MG: 400 TABLET ORAL at 22:21

## 2020-01-01 RX ADMIN — DEXAMETHASONE 6 MG: 4 TABLET ORAL at 08:21

## 2020-01-01 RX ADMIN — HYDROXYUREA 2000 MG: 500 CAPSULE ORAL at 09:14

## 2020-01-01 RX ADMIN — SODIUM CHLORIDE: 9 INJECTION, SOLUTION INTRAVENOUS at 15:39

## 2020-01-01 RX ADMIN — Medication 30 MCG/MIN: at 08:56

## 2020-01-01 RX ADMIN — HYDROXYUREA 1000 MG: 500 CAPSULE ORAL at 09:45

## 2020-01-01 RX ADMIN — SODIUM BICARBONATE: 84 INJECTION, SOLUTION INTRAVENOUS at 10:03

## 2020-01-01 RX ADMIN — LIDOCAINE HYDROCHLORIDE 10 ML: 10 INJECTION, SOLUTION INFILTRATION; PERINEURAL at 08:49

## 2020-01-01 RX ADMIN — FUROSEMIDE 40 MG: 10 INJECTION, SOLUTION INTRAMUSCULAR; INTRAVENOUS at 13:05

## 2020-01-01 RX ADMIN — FUROSEMIDE 40 MG: 10 INJECTION, SOLUTION INTRAMUSCULAR; INTRAVENOUS at 05:26

## 2020-01-01 RX ADMIN — VASOPRESSIN 20 UNITS: 20 INJECTION INTRAVENOUS at 17:13

## 2020-01-01 RX ADMIN — HYDROCODONE BITARTRATE AND ACETAMINOPHEN 1 TABLET: 5; 325 TABLET ORAL at 20:30

## 2020-01-01 RX ADMIN — PROMETHAZINE HYDROCHLORIDE 25 MG: 25 INJECTION INTRAMUSCULAR; INTRAVENOUS at 14:59

## 2020-01-01 RX ADMIN — VECURONIUM BROMIDE 10 MG: 1 INJECTION, POWDER, LYOPHILIZED, FOR SOLUTION INTRAVENOUS at 05:26

## 2020-01-01 RX ADMIN — PREDNISONE 10 MG: 10 TABLET ORAL at 09:10

## 2020-01-01 RX ADMIN — HYDROCORTISONE SODIUM SUCCINATE 100 MG: 100 INJECTION, POWDER, FOR SOLUTION INTRAMUSCULAR; INTRAVENOUS at 09:06

## 2020-01-01 RX ADMIN — SODIUM CHLORIDE 2448 ML: 9 INJECTION, SOLUTION INTRAVENOUS at 15:00

## 2020-01-01 RX ADMIN — SODIUM BICARBONATE 100 MEQ: 84 INJECTION, SOLUTION INTRAVENOUS at 10:31

## 2020-01-01 RX ADMIN — POLYVINYL ALCOHOL 1 DROP: 14 SOLUTION/ DROPS OPHTHALMIC at 08:53

## 2020-01-01 RX ADMIN — HYDROCODONE BITARTRATE AND ACETAMINOPHEN 1 TABLET: 5; 325 TABLET ORAL at 09:36

## 2020-01-01 RX ADMIN — HYDROCODONE BITARTRATE AND ACETAMINOPHEN 1 TABLET: 5; 325 TABLET ORAL at 04:49

## 2020-01-01 RX ADMIN — ASPIRIN 81 MG 81 MG: 81 TABLET ORAL at 10:50

## 2020-01-01 RX ADMIN — METOPROLOL TARTRATE 25 MG: 25 TABLET, FILM COATED ORAL at 08:22

## 2020-01-01 RX ADMIN — CHOLECALCIFEROL TAB 50 MCG (2000 UNIT) 2000 UNITS: 50 TAB at 09:36

## 2020-01-01 RX ADMIN — Medication 1 CAPSULE: at 09:17

## 2020-01-01 RX ADMIN — ASPIRIN 81 MG 81 MG: 81 TABLET ORAL at 09:12

## 2020-01-01 RX ADMIN — DEXAMETHASONE 6 MG: 4 TABLET ORAL at 20:29

## 2020-01-01 RX ADMIN — SERTRALINE HYDROCHLORIDE 25 MG: 25 TABLET, FILM COATED ORAL at 21:17

## 2020-01-01 RX ADMIN — VASOPRESSIN 0.04 UNITS/MIN: 20 INJECTION INTRAVENOUS at 23:53

## 2020-01-01 RX ADMIN — METOPROLOL TARTRATE 25 MG: 25 TABLET, FILM COATED ORAL at 20:30

## 2020-01-01 RX ADMIN — MINERAL OIL AND PETROLATUM: 150; 830 OINTMENT OPHTHALMIC at 08:52

## 2020-01-01 RX ADMIN — CEPHALEXIN 500 MG: 500 CAPSULE ORAL at 22:12

## 2020-01-01 RX ADMIN — PHENYLEPHRINE HYDROCHLORIDE 200 MCG/MIN: 10 INJECTION INTRAVENOUS at 11:03

## 2020-01-01 RX ADMIN — Medication 50 MCG/HR: at 11:51

## 2020-01-01 RX ADMIN — HYDROCODONE BITARTRATE AND ACETAMINOPHEN 1 TABLET: 5; 325 TABLET ORAL at 09:22

## 2020-01-01 RX ADMIN — REMDESIVIR 100 MG: 100 INJECTION, POWDER, LYOPHILIZED, FOR SOLUTION INTRAVENOUS at 20:18

## 2020-01-01 RX ADMIN — SODIUM CHLORIDE, PRESERVATIVE FREE 10 ML: 5 INJECTION INTRAVENOUS at 08:55

## 2020-01-01 RX ADMIN — SODIUM CHLORIDE 1000 ML: 9 INJECTION, SOLUTION INTRAVENOUS at 13:48

## 2020-01-01 RX ADMIN — DEXAMETHASONE 6 MG: 4 TABLET ORAL at 22:39

## 2020-01-01 RX ADMIN — HYDROCODONE BITARTRATE AND ACETAMINOPHEN 1 TABLET: 5; 325 TABLET ORAL at 20:08

## 2020-01-01 RX ADMIN — ONDANSETRON 4 MG: 2 INJECTION INTRAMUSCULAR; INTRAVENOUS at 03:05

## 2020-01-01 RX ADMIN — ACETAMINOPHEN ORAL SOLUTION 650 MG: 650 SOLUTION ORAL at 08:52

## 2020-01-01 RX ADMIN — Medication 1 CAPSULE: at 09:40

## 2020-01-01 RX ADMIN — LEVETIRACETAM: 10 INJECTION INTRAVASCULAR at 11:20

## 2020-01-01 RX ADMIN — Medication 2000 UNITS: at 14:11

## 2020-01-01 RX ADMIN — SODIUM CHLORIDE 1000 ML: 9 INJECTION, SOLUTION INTRAVENOUS at 09:54

## 2020-01-01 RX ADMIN — LEVOFLOXACIN 750 MG: 500 TABLET, FILM COATED ORAL at 22:41

## 2020-01-01 RX ADMIN — SERTRALINE HYDROCHLORIDE 25 MG: 25 TABLET, FILM COATED ORAL at 21:21

## 2020-01-01 ASSESSMENT — PAIN DESCRIPTION - LOCATION
LOCATION: ARM
LOCATION: ARM
LOCATION: GENERALIZED
LOCATION: BACK
LOCATION: GENERALIZED
LOCATION: BACK
LOCATION: HEAD
LOCATION: CHEST

## 2020-01-01 ASSESSMENT — PAIN DESCRIPTION - ONSET
ONSET: ON-GOING

## 2020-01-01 ASSESSMENT — ENCOUNTER SYMPTOMS
RHINORRHEA: 1
CONSTIPATION: 0
SORE THROAT: 0
SINUS PAIN: 1
VOMITING: 0
COUGH: 0
NAUSEA: 0
ABDOMINAL PAIN: 0
TROUBLE SWALLOWING: 0
BACK PAIN: 0
SINUS PRESSURE: 1
SHORTNESS OF BREATH: 0
DIARRHEA: 0
BLOOD IN STOOL: 0
ANAL BLEEDING: 0
VOMITING: 0
TROUBLE SWALLOWING: 0
SHORTNESS OF BREATH: 0
VOICE CHANGE: 0
COUGH: 0
VOICE CHANGE: 0
COLOR CHANGE: 0

## 2020-01-01 ASSESSMENT — PAIN DESCRIPTION - FREQUENCY
FREQUENCY: CONTINUOUS
FREQUENCY: INTERMITTENT
FREQUENCY: CONTINUOUS
FREQUENCY: INTERMITTENT
FREQUENCY: CONTINUOUS
FREQUENCY: INTERMITTENT
FREQUENCY: INTERMITTENT

## 2020-01-01 ASSESSMENT — PAIN SCALES - GENERAL
PAINLEVEL_OUTOF10: 8
PAINLEVEL_OUTOF10: 9
PAINLEVEL_OUTOF10: 0
PAINLEVEL_OUTOF10: 9
PAINLEVEL_OUTOF10: 0
PAINLEVEL_OUTOF10: 2
PAINLEVEL_OUTOF10: 10
PAINLEVEL_OUTOF10: 0
PAINLEVEL_OUTOF10: 0
PAINLEVEL_OUTOF10: 10
PAINLEVEL_OUTOF10: 0
PAINLEVEL_OUTOF10: 6
PAINLEVEL_OUTOF10: 0
PAINLEVEL_OUTOF10: 8
PAINLEVEL_OUTOF10: 0
PAINLEVEL_OUTOF10: 9
PAINLEVEL_OUTOF10: 0
PAINLEVEL_OUTOF10: 9
PAINLEVEL_OUTOF10: 0
PAINLEVEL_OUTOF10: 8
PAINLEVEL_OUTOF10: 0
PAINLEVEL_OUTOF10: 9
PAINLEVEL_OUTOF10: 0
PAINLEVEL_OUTOF10: 6
PAINLEVEL_OUTOF10: 8
PAINLEVEL_OUTOF10: 0
PAINLEVEL_OUTOF10: 8
PAINLEVEL_OUTOF10: 7
PAINLEVEL_OUTOF10: 9
PAINLEVEL_OUTOF10: 8
PAINLEVEL_OUTOF10: 6
PAINLEVEL_OUTOF10: 0
PAINLEVEL_OUTOF10: 6
PAINLEVEL_OUTOF10: 10
PAINLEVEL_OUTOF10: 4
PAINLEVEL_OUTOF10: 10
PAINLEVEL_OUTOF10: 0
PAINLEVEL_OUTOF10: 9
PAINLEVEL_OUTOF10: 0
PAINLEVEL_OUTOF10: 8
PAINLEVEL_OUTOF10: 5
PAINLEVEL_OUTOF10: 10
PAINLEVEL_OUTOF10: 0
PAINLEVEL_OUTOF10: 0
PAINLEVEL_OUTOF10: 8

## 2020-01-01 ASSESSMENT — PAIN DESCRIPTION - ORIENTATION
ORIENTATION: LOWER
ORIENTATION: LOWER
ORIENTATION: MID
ORIENTATION: LOWER
ORIENTATION: RIGHT;UPPER
ORIENTATION: RIGHT;UPPER
ORIENTATION: LOWER

## 2020-01-01 ASSESSMENT — PAIN DESCRIPTION - PAIN TYPE
TYPE: ACUTE PAIN
TYPE: CHRONIC PAIN
TYPE: ACUTE PAIN
TYPE: ACUTE PAIN
TYPE: CHRONIC PAIN
TYPE: CHRONIC PAIN
TYPE: ACUTE PAIN
TYPE: CHRONIC PAIN
TYPE: CHRONIC PAIN

## 2020-01-01 ASSESSMENT — PAIN DESCRIPTION - PROGRESSION
CLINICAL_PROGRESSION: NOT CHANGED
CLINICAL_PROGRESSION: GRADUALLY WORSENING
CLINICAL_PROGRESSION: NOT CHANGED

## 2020-01-01 ASSESSMENT — PAIN DESCRIPTION - DESCRIPTORS
DESCRIPTORS: THROBBING;CONSTANT
DESCRIPTORS: ACHING;DISCOMFORT;SORE
DESCRIPTORS: ACHING;DISCOMFORT;SORE
DESCRIPTORS: CONSTANT
DESCRIPTORS: ACHING;CONSTANT
DESCRIPTORS: CONSTANT;DISCOMFORT;THROBBING
DESCRIPTORS: ACHING;DISCOMFORT
DESCRIPTORS: CONSTANT;THROBBING
DESCRIPTORS: ACHING;DISCOMFORT
DESCRIPTORS: ACHING;DISCOMFORT
DESCRIPTORS: HEADACHE
DESCRIPTORS: ACHING;DISCOMFORT
DESCRIPTORS: CONSTANT;DISCOMFORT;THROBBING
DESCRIPTORS: ACHING;DISCOMFORT;SORE

## 2020-01-01 ASSESSMENT — PAIN - FUNCTIONAL ASSESSMENT

## 2020-01-01 ASSESSMENT — PULMONARY FUNCTION TESTS
PIF_VALUE: 31
PIF_VALUE: 35
PIF_VALUE: 40
PIF_VALUE: 25
PIF_VALUE: 30

## 2020-02-14 NOTE — ED PROVIDER NOTES
Psychiatric/Behavioral: Negative. Physical Exam  Vitals signs and nursing note reviewed. Constitutional:       General: She is not in acute distress. Appearance: She is well-developed. She is not ill-appearing, toxic-appearing or diaphoretic. HENT:      Head: Normocephalic and atraumatic. Jaw: There is normal jaw occlusion. Right Ear: External ear normal.      Left Ear: External ear normal.      Nose:      Right Nostril: Epistaxis present. No foreign body, septal hematoma or occlusion. Left Nostril: No foreign body, epistaxis, septal hematoma or occlusion. Right Turbinates: Not enlarged, swollen or pale. Left Turbinates: Not enlarged, swollen or pale. Comments: Nosebleed from right nares, small area on the posterior nasal septum that patient attempted to cauterize herself. No obvious bleeding anteriorly. Mouth/Throat:      Mouth: Mucous membranes are moist.      Pharynx: Oropharynx is clear. No oropharyngeal exudate or posterior oropharyngeal erythema. Eyes:      General: No scleral icterus. Right eye: No discharge. Left eye: No discharge. Conjunctiva/sclera: Conjunctivae normal.      Pupils: Pupils are equal, round, and reactive to light. Neck:      Musculoskeletal: Normal range of motion and neck supple. No neck rigidity or muscular tenderness. Thyroid: No thyromegaly. Vascular: No JVD. Trachea: No tracheal deviation. Cardiovascular:      Rate and Rhythm: Regular rhythm. Tachycardia present. Pulses: Normal pulses. Radial pulses are 2+ on the right side and 2+ on the left side. Heart sounds: Normal heart sounds, S1 normal and S2 normal. Heart sounds not distant. No murmur. No friction rub. No gallop. No S3 or S4 sounds. Pulmonary:      Effort: Pulmonary effort is normal. No respiratory distress. Breath sounds: Normal breath sounds.  No stridor, decreased air movement or transmitted upper airway this and had no further bleeding anteriorly or down the back of her throat. Patient continues to be non-toxic on re-evaluation. Findings were discussed with the patient and reasons to immediately return to the ED were articulated to them. They will follow-up with their PMD and otolaryngology. Patient was prescribed antibiotics and pain medications and will follow up with ENT within the next 3 to 5 days. She will call her hematology oncology physician to discuss when to restart her Coumadin. Coumadin was not reversed due to patient's strong history of blood clots and IVC filter placement. Hemoglobin also stable. EKG: This EKG is signed and interpreted by me. Rate: 86  Rhythm: Sinus  Interpretation: Normal sinus rhythm, possible left atrial enlargement  Comparison: stable as compared to patient's most recent EKG on 12-8-2019    ED Course as of Feb 14 1515 Fri Feb 14, 2020   9523 No obvious anterior bleed from the right nares. Patient does have an area that she tried cauterizing with a silver nitrate stick on the nasal septum posteriorly. She does have active slow trickle from the right nares after having her blow her nose. Patient has now had 2 sprays of Carlos-Synephrine into the right nares followed by cottonball packing soaked with Carlos-Synephrine and 1% lidocaine. Will reassess in 10 to 15 minutes. [LS]   [place] placed in the right nares,  Posterior dual bladder version. Will recheck in 15 to 20 minutes for rebleeding.    [LS]   1031 Patient reassessed. She is still bleeding around the nasal packing. INR is 4.5. We will call ENT.    [LS]   [phone] ENT resident at the bedside.     [LS]   [phone] ENT resident has evaluated the patient and added saline to the nasal packing vigil. She would like us to monitor the patient for an hour. Will likely discharge if only small amount of anterior drainage noted.   If significant bout of posterior drainage down the throat still present then will need to do further intervention.    [LS]   1158 Patient reassessed. She has no active bleeding at this time and no drainage on the back of her throat.    [LS]   [phone] ENT resident called back discussed the case. Patient is already been discharged.    [LS]   [phone] Patient just called back stating that she had a small blood-tinged tear come from her right eye. She has there is no active bleeding and it seems to have stopped. Advised the patient that she continued to have any bright red bleeding that is constant ongoing that she will need to come back in for evaluation. She states she is not currently having this. I did advise her that if anytime she feels she needs another evaluation or feels that something is not right that she should come back in to the ER for evaluation.    [LS]      ED Course User Index  [LS] Angelika Santamaria, DO          ED Course as of Feb 14 1516 Fri Feb 14, 2020   8791 No obvious anterior bleed from the right nares. Patient does have an area that she tried cauterizing with a silver nitrate stick on the nasal septum posteriorly. She does have active slow trickle from the right nares after having her blow her nose. Patient has now had 2 sprays of Carlos-Synephrine into the right nares followed by cottonball packing soaked with Carlos-Synephrine and 1% lidocaine. Will reassess in 10 to 15 minutes. [LS]   [place] placed in the right nares,  Posterior dual bladder version. Will recheck in 15 to 20 minutes for rebleeding.    [LS]   1031 Patient reassessed. She is still bleeding around the nasal packing. INR is 4.5. We will call ENT.    [LS]   [phone] ENT resident at the bedside.     [LS]   [phone] ENT resident has evaluated the patient and added saline to the nasal packing vigil. She would like us to monitor the patient for an hour. Will likely discharge if only small amount of anterior drainage noted.   If significant bout of posterior drainage down the throat still present then will need to do further intervention.    [LS]   4529 Patient reassessed. She has no active bleeding at this time and no drainage on the back of her throat.    [LS]   [phone] ENT resident called back discussed the case. Patient is already been discharged.    [LS]   [phone] Patient just called back stating that she had a small blood-tinged tear come from her right eye. She has there is no active bleeding and it seems to have stopped. Advised the patient that she continued to have any bright red bleeding that is constant ongoing that she will need to come back in for evaluation. She states she is not currently having this. I did advise her that if anytime she feels she needs another evaluation or feels that something is not right that she should come back in to the ER for evaluation.    [LS]      ED Course User Index  [LS] Emili Parada, DO       --------------------------------------------- PAST HISTORY ---------------------------------------------  Past Medical History:  has a past medical history of Anxiety, Breast disorder, History of DVT (deep vein thrombosis), Polycythemia vera(238.4), PONV (postoperative nausea and vomiting), Pulmonary embolism ([address] Recurrent epistaxis, Sweet syndrome, and Venous insufficiency of leg. Past Surgical History:  has a past surgical history that includes Colon surgery; Knee arthroscopy; shoulder surgery; other surgical history (1/28/16); other surgical history (Right, 08/25/2016); Breast lumpectomy; and Vena Cava Filter Placement (N/A, 12/10/2019). Social History:  reports that she quit smoking about 16 years ago. Her smoking use included cigarettes. She has a 5.00 pack-year smoking history. She has never used smokeless tobacco. She reports current alcohol use. She reports that she does not use drugs. Family History: family history includes Cancer in her father; Diabetes in her mother; Thyroid Disease in her sister. The patients home medications have been reviewed.     Allergies: Dilaudid [hydromorphone hcl]    -------------------------------------------------- RESULTS -------------------------------------------------  Labs:  Results for orders placed or performed during the hospital encounter of 02/14/20   Protime-INR   Result Value Ref Range    Protime 52.7 (H) 9.3 - 12.4 sec    INR 4.5    CBC Auto Differential   Result Value Ref Range    WBC 6.3 4.5 - 11.5 E9/L    RBC 3.88 3.50 - 5.50 E12/L    Hemoglobin 12.3 11.5 - 15.5 g/dL    Hematocrit 41.2 34.0 - 48.0 %    .2 (H) 80.0 - 99.9 fL    MCH 31.7 26.0 - 35.0 pg    MCHC 29.9 (L) 32.0 - 34.5 %    RDW 23.6 (H) 11.5 - 15.0 fL    Platelets 63 (L) 874 - 450 E9/L    MPV NOT CALC 7.0 - 12.0 fL    Neutrophils % 81.0 (H) 43.0 - 80.0 %    Lymphocytes % 11.0 (L) 20.0 - 42.0 %    Monocytes % 4.0 2.0 - 12.0 %    Eosinophils % 2.0 0.0 - 6.0 %    Basophils % 0.0 0.0 - 2.0 %    Neutrophils Absolute 5.10 1.80 - 7.30 E9/L    Lymphocytes Absolute 0.82 (L) 1.50 - 4.00 E9/L    Monocytes Absolute 0.25 0.10 - 0.95 E9/L    Eosinophils Absolute 0.13 0.05 - 0.50 E9/L    Basophils Absolute 0.00 0.00 - 0.20 E9/L    Atypical Lymphocytes Relative 2.0 0.0 - 4.0 %    Anisocytosis 2+     Poikilocytes 1+     Ovalocytes 1+    Platelet Confirmation   Result Value Ref Range    Platelet Confirmation CONFIRMED    EKG 12 Lead   Result Value Ref Range    Ventricular Rate 86 BPM    Atrial Rate 86 BPM    P-R Interval 146 ms    QRS Duration 86 ms    Q-T Interval 362 ms    QTc Calculation (Bazett) 433 ms    P Axis 59 degrees    R Axis 3 degrees    T Axis 33 degrees       Radiology:  No orders to display       ------------------------- NURSING NOTES AND VITALS REVIEWED ---------------------------  Date / Time Roomed:  2/14/2020  8:33 AM  ED Bed Assignment:  [facility]/[place] [id number]    The nursing notes within the ED encounter and vital signs as below have been reviewed.    BP (!) 159/79   Pulse 94   Temp 98.2 °F (36.8 °C) (Oral)   Resp 14   Ht 5' 5\" (1.651 m)   Wt 175 lb (79.4 kg) SpO2 96%   BMI 29.12 kg/m²   Oxygen Saturation Interpretation: Normal      ------------------------------------------ PROGRESS NOTES ------------------------------------------  3:16 PM  I have spoken with the patient and discussed todays results, in addition to providing specific details for the plan of care and counseling regarding the diagnosis and prognosis. Their questions are answered at this time and they are agreeable with the plan. I discussed at length with them reasons for immediate return here for re evaluation. They will followup with their Otolaryngologist and primary care physician by calling their office on Monday.      --------------------------------- ADDITIONAL PROVIDER NOTES ---------------------------------  At this time the patient is without objective evidence of an acute process requiring hospitalization or inpatient management. They have remained hemodynamically stable throughout their entire ED visit and are stable for discharge with outpatient follow-up. The plan has been discussed in detail and they are aware of the specific conditions for emergent return, as well as the importance of follow-up. Discharge Medication List as of 2/14/2020  1:05 PM      START taking these medications    Details   cephALEXin (KEFLEX) 500 MG capsule Take 1 capsule by mouth 3 times daily for 5 days, Disp-15 capsule, R-0Print      HYDROcodone-acetaminophen (NORCO) 5-325 MG per tablet Take 1 tablet by mouth every 6 hours as needed for Pain for up to 3 days. , Disp-12 tablet, R-0Print             Diagnosis:  1. Epistaxis    2. Supratherapeutic INR    3. Hypertension, unspecified type        Disposition:  Patient's disposition: Discharge to home  Patient's condition is stable.           Maximo De La Torre DO  Resident  02/14/20 [id number]

## 2020-02-14 NOTE — ED NOTES
Bed:  [place] [address]  Expected date:   Expected time:   Means of arrival:   Comments:  Zenaida Cervantes RN  02/14/20 0471

## 2020-02-14 NOTE — TELEPHONE ENCOUNTER
----- Message from Aga Baxter DO sent at 2/14/2020 11:38 AM EST -----  Regarding: Nose packing pull  Tad Armstrong,    This patient will need to be seen in the clinic on Tuesday 2/18/2020 to have her R nasal packing pulled. It was placed by ED morning of 2/14/2020/. Thanks!

## 2020-02-18 NOTE — PROGRESS NOTES
DEPARTMENT OF OTOLARYNGOLOGY   HISTORY AND PHYSICAL      PATIENT: Dixie Cain : 1955 (67 y.o.)   DATE: 2020  REFERRED BY: No referring provider defined for this encounter. HISTORY OBTAINED FROM:  patient    CHIEF COMPLAINT:  had concerns including Epistaxis (still blowing out from the left side some clots and alot drainage. stuffy). HISTORY OF PRESENT ILLNESS:                                                                                        Dixie Cain is a(n) 75 y.o. female with significant past medical history of polycythemia vera, PE anticoagulated on coumidin and also oral chemotherapy tablet presents to the office today as a new patient for evaluation of her epistaxis. . Had posterior packing with 9cm Rhino rocket. ENT Resident saw her. Still having clots and blood coming out of left nostril. She is also still having posterior nasal drip. Did resume Coumidin 2.5mg once daily yesterday. States 3 years ago had severe nose bleed, required OR cauterization, was on xarelto at that time. States since that time the right always gets dry and scabbed.      Past Medical History:   Diagnosis Date    Anxiety     history of    Breast disorder     History of DVT (deep vein thrombosis) 2013    Polycythemia vera(238.4)     follows with Dr. Krish Cage monthly, treated w Nate Min & intermittent phlebotomies    PONV (postoperative nausea and vomiting)     Pulmonary embolism ([address]     history of    Recurrent epistaxis     Sweet syndrome     Venous insufficiency of leg 2013        Past Surgical History:   Procedure Laterality Date    BREAST LUMPECTOMY      X3    COLON SURGERY      KNEE ARTHROSCOPY      OTHER SURGICAL HISTORY  16    control of nose bleed    OTHER SURGICAL HISTORY Right 2016    right sphenopalatine artery ligation    SHOULDER SURGERY      VENA CAVA FILTER PLACEMENT N/A 12/10/2019    FEMORAL VENA CAVA FILTER INSERTION performed by Kym Hui MD at [facility] OR         Current Outpatient Medications:     cephALEXin (KEFLEX) 500 MG capsule, Take 1 capsule by mouth 3 times daily for 5 days, Disp: 15 capsule, Rfl: 0    warfarin (COUMADIN) 5 MG tablet, Take 5 mg by mouth, Disp: , Rfl:     Cholecalciferol (VITAMIN D) 50 MCG (2000 UT) CAPS capsule, Take 2,000 Units by mouth daily Every day except Monday and Thursday when pt takes 11954 units. , Disp: , Rfl:     BIOTIN PO, Take by mouth, Disp: , Rfl:     vitamin D (ERGOCALCIFEROL) 38400 units CAPS capsule, Take 50,000 Units by mouth Twice a Week Monday and Thursday, Disp: , Rfl:     Probiotic Product (PROBIOTIC DAILY PO), Take by mouth daily Indications: 30 billion, Disp: , Rfl:     sertraline (ZOLOFT) 25 MG tablet, Take 25 mg by mouth every evening , Disp: , Rfl:     enoxaparin (LOVENOX) 100 MG/ML injection, Inject into the skin 2 times daily, Disp: , Rfl:     hydroxyurea (HYDREA) 500 MG chemo capsule, Take 4 capsules by mouth daily (Patient not taking: Reported on 2/18/2020), Disp: 28 capsule, Rfl: 0    Allergies   Allergen Reactions    Dilaudid [Hydromorphone Hcl] Other (See Comments)     Patient states she had facial flushing.        Family History   Problem Relation Age of Onset   Sherren Pean Cancer Father     Diabetes Mother     Thyroid Disease Sister        Social History     Socioeconomic History    Marital status:      Spouse name: Not on file    Number of children: Not on file    Years of education: Not on file    Highest education level: Not on file   Occupational History    Not on file   Social Needs    Financial resource strain: Not on file    Food insecurity:     Worry: Not on file     Inability: Not on file    Transportation needs:     Medical: Not on file     Non-medical: Not on file   Tobacco Use    Smoking status: Former Smoker     Packs/day: 0.50     Years: 10.00     Pack years: 5.00     Types: Cigarettes     Last attempt to quit: 1/16/2004     Years since quittin.1    Smokeless tobacco: Never Used   Substance and Sexual Activity    Alcohol use: Yes     Comment: Socially    Drug use: No    Sexual activity: Yes     Partners: Male   Lifestyle    Physical activity:     Days per week: Not on file     Minutes per session: Not on file    Stress: Not on file   Relationships    Social connections:     Talks on phone: Not on file     Gets together: Not on file     Attends Spiritism service: Not on file     Active member of club or organization: Not on file     Attends meetings of clubs or organizations: Not on file     Relationship status: Not on file    Intimate partner violence:     Fear of current or ex partner: Not on file     Emotionally abused: Not on file     Physically abused: Not on file     Forced sexual activity: Not on file   Other Topics Concern    Not on file   Social History Narrative    Not on file       REVIEW OF SYSTEMS:  Review of Systems  Constitutional: Negative for chills and fever. Eyes: Negative for discharge and itching. ENT: Negative for congestion, ear discharge, ear pain, hearing loss and sore throat. +PND, +epistaxis   Respiratory: Negative for chest tightness and shortness of breath. Cardiovascular: Negative for chestpain and palpitations. Gastrointestinal: Negative for abdominal distention and abdominal pain. Endocrinology: Negative for heat and cold intolerance. Neurological: Negative for focal weaknessor seizure   Skin: Negative for rash and itchiness   Psychiatric: Negative for depression and hallucinations     PHYSICAL EXAM:                                                                                                                BP (!) 156/100 (Site: Left Upper Arm, Position: Sitting, Cuff Size: Medium Adult)   Pulse 105   Wt 177 lb 3.2 oz (80.4 kg)   BMI 29.49 kg/m²   Physical Exam  Constitutional: Appears well-developed and well-nourished. Appears as stated age.    Eyes: Lids and lashes normal, pupils

## 2020-02-18 NOTE — PATIENT INSTRUCTIONS
Three days after the packing has been removed, Use over the counter Ocean Nasal Spray 3-4 times a day or any other over the counter saline nasal spray in both nostrils. Do not blow your nose for the next 7-10 days. If you have to cough or sneeze open your mouth. The packing that was put into your nose today may or may not fall out on its own if packing does fall out it is not a problem. Do not do any heavy lifting,bending over or strenuous work, for example (cutting the grass,vacuuming)    If your nosebleed starts up pinch the soft part of your nose for 5 minutes, if it does not stop, after 5 minutes pinch again for another 5 more minutes. If nosebleed continues after the 10 minutes, you can call our office during regular business hours [phone] and after business hours go to the nearest Emergency Department.

## 2020-02-21 PROBLEM — R91.8 LUNG MASS: Status: ACTIVE | Noted: 2020-01-01

## 2020-02-21 NOTE — ED PROVIDER NOTES
includes Cancer in her father; Diabetes in her mother; Thyroid Disease in her sister. The patients home medications have been reviewed. Allergies: Dilaudid [hydromorphone hcl]    ---------------------------------------------------PHYSICAL EXAM--------------------------------------    Constitutional/General: Alert and oriented x3, well appearing, non toxic in NAD  Head: Normocephalic and atraumatic  Eyes: PERRL, EOMI  Mouth: Oropharynx clear, handling secretions, no trismus  Neck: Supple, full ROM, non tender to palpation in the midline, no stridor, no crepitus, no meningeal signs  Pulmonary: Lungs clear to auscultation bilaterally, no wheezes, rales, or rhonchi. Not in respiratory distress  Cardiovascular: Tachycardic and  regular rate. Regular rhythm. No murmurs, gallops, or rubs. 2+ distal pulses  Chest: no chest wall tenderness  Abdomen: Soft. Non tender. Non distended. +BS. No rebound, guarding, or rigidity. No pulsatile masses appreciated. Musculoskeletal: Moves all extremities x 4. Warm and well perfused, no clubbing, cyanosis, or edema. Capillary refill <3 seconds  Skin: warm and dry. No rashes. Or abnormal bruising. Neurologic: GCS 15, CN 2-12 grossly intact, no focal deficits, symmetric strength 5/5 in the upper and lower extremities bilaterally  Psych: Normal Affect    -------------------------------------------------- RESULTS -------------------------------------------------  I have personally reviewed all laboratory and imaging results for this patient. Results are listed below.      LABS:  Results for orders placed or performed during the hospital encounter of 02/21/20   CBC   Result Value Ref Range    WBC 27.1 (H) 4.5 - 11.5 E9/L    RBC 3.58 3.50 - 5.50 E12/L    Hemoglobin 11.4 (L) 11.5 - 15.5 g/dL    Hematocrit 38.5 34.0 - 48.0 %    .5 (H) 80.0 - 99.9 fL    MCH 31.8 26.0 - 35.0 pg    MCHC 29.6 (L) 32.0 - 34.5 %    RDW 22.9 (H) 11.5 - 15.0 fL    Platelets 486 (H) 540 - 450 E9/L MPV 10.1 7.0 - 12.0 fL   Troponin   Result Value Ref Range    Troponin <0.01 0.00 - 0.03 ng/mL   Basic Metabolic Panel   Result Value Ref Range    Sodium 135 132 - 146 mmol/L    Potassium 4.1 3.5 - 5.0 mmol/L    Chloride 96 (L) 98 - 107 mmol/L    CO2 24 22 - 29 mmol/L    Anion Gap 15 7 - 16 mmol/L    Glucose 105 (H) 74 - 99 mg/dL    BUN 14 8 - 23 mg/dL    CREATININE 1.4 (H) 0.5 - 1.0 mg/dL    GFR Non-African American 38 >=60 mL/min/1.73    GFR African American 45     Calcium 8.9 8.6 - 10.2 mg/dL   EKG 12 Lead   Result Value Ref Range    Ventricular Rate 118 BPM    Atrial Rate 118 BPM    P-R Interval 122 ms    QRS Duration 86 ms    Q-T Interval 312 ms    QTc Calculation (Bazett) 437 ms    P Axis 44 degrees    R Axis 21 degrees    T Axis 48 degrees       RADIOLOGY:  Interpreted by Radiologist.  CTA PULMONARY W CONTRAST   Final Result      Resolution of bilateral pulmonary embolism. Persistent consolidation within the bilateral costophrenic angles   which is becoming suspicious for underlying malignancy. Further workup   is recommended. Consider PET scan for further characterization. Increasing mediastinal lymphadenopathy. Sliding hiatal hernia. Mild diffuse interstitial lung disease. IMPRESSION:      No evidence for pulmonary embolism or aortic dissection. No evidence for acute process on CTA of the chest.            EKG Interpretation  Interpreted by emergency department physician    Rhythm: sinus tachycardia  Rate: 118  Axis: normal  Conduction: normal  ST Segments: no acute change  T Waves: no acute change    Clinical Impression: sinus tachycardia  Comparison to prior EKG: None      ------------------------- NURSING NOTES AND VITALS REVIEWED ---------------------------   The nursing notes within the ED encounter and vital signs as below have been reviewed by myself.   /63   Pulse 130   Temp 98.6 °F (37 °C) (Oral)   Resp 16   Ht 5' 5\" (1.651 m)   Wt 180 lb (81.6 kg)   SpO2 97%   BMI 29.95 kg/m²   Oxygen Saturation Interpretation: Normal    The patients available past medical records and past encounters were reviewed. ------------------------------ ED COURSE/MEDICAL DECISION MAKING----------------------  Medications   0.9 % sodium chloride bolus (has no administration in time range)   0.9 % sodium chloride bolus (1,000 mLs Intravenous New Bag 2/21/20 1348)   0.9 % sodium chloride bolus (1,000 mLs Intravenous New Bag 2/21/20 1447)   iopamidol (ISOVUE-370) 76 % injection 70 mL (70 mLs Intravenous Given 2/21/20 1437)   sodium chloride flush 0.9 % injection 10 mL (10 mLs Intravenous Given 2/21/20 1435)   promethazine (PHENERGAN) injection 25 mg (25 mg Intravenous Given 2/21/20 1459)   fentaNYL (SUBLIMAZE) injection 25 mcg (25 mcg Intravenous Given 2/21/20 1458)             Medical Decision Making:    Patient history of DVT PE with labile INRs sent for evaluation for possible PE. Patient has shortness of breath and chest pain. Patient was hydrated with saline prior to CTA. CT demonstrated peripheral nodules worse from previous study. Concerning for malignancy. No pneumonia was identified. There is no PE. Patient remains tachycardic despite IV fluids. She does have a leukocytosis. I did discuss admission with patient and family they are in agreement. I did speak with on-call covering PCP who agreed to admit. Re-Evaluations:             Re-evaluation. Patients symptoms are improving      Consultations:             Dr. Yahaira Diggs will admit. Critical Care: NONE        This patient's ED course included: a personal history and physicial examination, re-evaluation prior to disposition and a personal history and physicial eaxmination    This patient has remained hemodynamically stable, improved and been closely monitored during their ED course. Counseling:    The emergency provider has spoken with the patient and spouse/SO and discussed todays results, in addition to

## 2020-02-22 NOTE — CONSULTS
[facility]  [address], [address]  [facility], [address] [place] [address]    Pulmonary Consultation      Reason for Consult:  Back pain and fever    Requesting Physician:  Marichuy Lagos DO    CHIEF COMPLAINT:  Back pain and fever    History Obtained From:  patient    HISTORY OF PRESENT ILLNESS:                The patient is a 75 y.o. female with significant past medical history of Sweet syndrome who presents with increased back pain and fever with associated sob for a day. On chronic prednisone therapy for Sweet's syndrome (10 mg daily for past 6 months, on steroids for yrs) and coumadin with IVC filter placed since December due to PE (unclear if due to failure from Xarelto or thrombocytosis). Also had epistaxis requiring packing last week. Had been doing well with level of physical activity with no issues with respiratory function or cough, claims started having problems with back pain, also mentions feeling slightly sob and weak, not much cough/congestion, and fever. Went to PCP, told heart rate was high (?) also hypoxic and sent to ER. Since here, on RA (saturating at 94%), better with back pain, not much cough/congestion, lying down in bed, ambulated some in room and tolerated.     Past Medical History:        Diagnosis Date    Anxiety     history of    Breast disorder     History of DVT (deep vein thrombosis) 12/30/2013    Polycythemia vera(238.4)     follows with Dr. Brigitte Warner monthly, treated w Hydrea & intermittent phlebotomies    PONV (postoperative nausea and vomiting)     Pulmonary embolism ([address]     history of    Recurrent epistaxis     Sweet syndrome     Venous insufficiency of leg 12/30/2013    R leg per patient       Past Surgical History:        Procedure Laterality Date    BREAST LUMPECTOMY      X3    COLON SURGERY      KNEE ARTHROSCOPY      Right per patient    OTHER SURGICAL HISTORY  1/28/16    control of nose bleed    OTHER SURGICAL HISTORY Right 08/25/2016    right sphenopalatine artery ligation    SHOULDER SURGERY      R rotator cuff per patient    VENA CAVA FILTER PLACEMENT N/A 12/10/2019    FEMORAL VENA CAVA FILTER INSERTION performed by Suzan Caraballo MD at [facility] OR       Current Medications:    Current Facility-Administered Medications: HYDROcodone-acetaminophen (NORCO) 5-325 MG per tablet 1 tablet, 1 tablet, Oral, Q6H PRN  [START ON 2/23/2020] hydroxyurea (HYDREA) chemo capsule 1,000 mg, 1,000 mg, Oral, Daily  sodium chloride flush 0.9 % injection 10 mL, 10 mL, Intravenous, 2 times per day  sodium chloride flush 0.9 % injection 10 mL, 10 mL, Intravenous, PRN  acetaminophen (TYLENOL) tablet 650 mg, 650 mg, Oral, Q4H PRN  0.9 % sodium chloride infusion, , Intravenous, Continuous  ondansetron (ZOFRAN-ODT) disintegrating tablet 4 mg, 4 mg, Oral, Q8H PRN  vitamin D tablet 2,000 Units, 2,000 Units, Oral, Daily  [START ON 2/24/2020] vitamin D (ERGOCALCIFEROL) capsule 50,000 Units, 50,000 Units, Oral, Once per day on Mon Thu  warfarin (COUMADIN) tablet 5 mg, 5 mg, Oral, Daily  lactobacillus (CULTURELLE) capsule 1 capsule, 1 capsule, Oral, Daily  predniSONE (DELTASONE) tablet 10 mg, 10 mg, Oral, Daily  sertraline (ZOLOFT) tablet 25 mg, 25 mg, Oral, Nightly    Allergies:  Dilaudid [hydromorphone hcl]    Social History:    TOBACCO:   reports that she quit smoking about 16 years ago. Her smoking use included cigarettes. She has a 5.00 pack-year smoking history. She has never used smokeless tobacco.    Family History:       Problem Relation Age of Onset    Cancer Father     Diabetes Mother     Thyroid Disease Sister        REVIEW OF SYSTEMS:    RESPIRATORY:  Sob and back pain and fever  Remainder of complete ROS is negative.     PHYSICAL EXAM:      Vitals:    /70   Pulse 92   Temp 98.2 °F (36.8 °C) (Oral)   Resp 16   Ht 5' 5\" (1.651 m)   Wt 184 lb 1.6 oz (83.5 kg)   SpO2 95%   BMI 30.64 kg/m²     EYES:  Lids and lashes normal,

## 2020-02-22 NOTE — CONSULTS
[address] [place] [address] [facility]  Consultation Note     Admit Date: 2/21/2020 12:35 PM    Reason for Consult:   Fever. Elevated WBC    Attending Physician:  Matias Fernandez DO    HISTORY OF PRESENT ILLNESS:             The history is obtained from extensive review of available past medical records. The patient is a 75 y.o. female who is known to the ID service. The patient was seen in 2016 for what seemed to be septic phlebitis but ended up doing thrombophlebitis. She also had some slightly tender subcutaneous nodules that were felt to be erythema nodosum. [facility] and was diagnosed with Sweet syndrome. She also has polycythemia vera. She has been on 10 mg of prednisone ever since. When she goes down to 5 mg daily sick syndrome exacerbates. The patient had epistaxis and had packing that was eventually removed. She was placed on Cefdinir which she recently finished for 7 days. The patient presents to the hospital on 2/21/2020 complaining of chest pain, dyspnea on exertion. She had a CT of the chest that did not reveal pulmonary embolism but showed some pleural-based nodules. She did denies any sick contacts but works as a  in a dental office. She also reports having had liquid stools x3 today. She was prescribed Cephalexin x3 doses. A respiratory panel was negative. WBC was at 27.1 but no differential was done    Past Medical History:        Diagnosis Date    Anxiety     history of    Breast disorder     History of DVT (deep vein thrombosis) 12/30/2013    Polycythemia vera(238.4)     follows with Dr. Keli Corrales monthly, treated w Susan Sender & intermittent phlebotomies    PONV (postoperative nausea and vomiting)     Pulmonary embolism ([address]     history of    Recurrent epistaxis     Sweet syndrome     Venous insufficiency of leg 12/30/2013    R leg per patient     February 2016. Admitted to the hospital with epistaxis.   She had a peripheral IV on the left arm. She was discharged home and noticed redness and tenderness over the site where she had a peripheral IV. Came back to the hospital and was treated with Doxycycline. Seen by ID for septic phlebitis. She was felt to have superficial thrombophlebitis and possible erythema nodosum. Septic phlebitis was less likely. A duplex showed occlusive superficial venous thrombosis. Antibiotics were withheld. Treated with steroids and discharged after a couple of days.     Past Surgical History:        Procedure Laterality Date    BREAST LUMPECTOMY      X3    COLON SURGERY      KNEE ARTHROSCOPY      Right per patient    OTHER SURGICAL HISTORY  1/28/16    control of nose bleed    OTHER SURGICAL HISTORY Right 08/25/2016    right sphenopalatine artery ligation    SHOULDER SURGERY      R rotator cuff per patient    VENA CAVA FILTER PLACEMENT N/A 12/10/2019    FEMORAL VENA CAVA FILTER INSERTION performed by Julienne Harding MD at [facility] OR     Current Medications:   Scheduled Meds:   [START ON 2/23/2020] hydroxyurea  1,000 mg Oral Daily    hydroxyurea  500 mg Oral Once    sodium chloride flush  10 mL Intravenous 2 times per day    vitamin D  2,000 Units Oral Daily    [START ON 2/24/2020] vitamin D  50,000 Units Oral Once per day on Mon Thu    warfarin  5 mg Oral Daily    lactobacillus  1 capsule Oral Daily    cephALEXin  500 mg Oral 4 times per day    predniSONE  10 mg Oral Daily    sertraline  25 mg Oral Nightly     Continuous Infusions:   sodium chloride 75 mL/hr at 02/22/20 1034     PRN Meds:HYDROcodone 5 mg - acetaminophen, sodium chloride flush, acetaminophen, ondansetron    Allergies:  Dilaudid [hydromorphone hcl]    Social History:   Social History     Socioeconomic History    Marital status:      Spouse name: None    Number of children: None    Years of education: None    Highest education level: None   Occupational History    None   Social Needs    Financial resource strain: None    Food insecurity:     Worry: None     Inability: None    Transportation needs:     Medical: None     Non-medical: None   Tobacco Use    Smoking status: Former Smoker     Packs/day: 0.50     Years: 10.00     Pack years: 5.00     Types: Cigarettes     Last attempt to quit: 2004     Years since quittin.1    Smokeless tobacco: Never Used   Substance and Sexual Activity    Alcohol use: Yes     Comment: Socially    Drug use: No    Sexual activity: Yes     Partners: Male   Lifestyle    Physical activity:     Days per week: None     Minutes per session: None    Stress: None   Relationships    Social connections:     Talks on phone: None     Gets together: None     Attends [employer] service: None     Active member of club or organization: None     Attends meetings of clubs or organizations: None     Relationship status: None    Intimate partner violence:     Fear of current or ex partner: None     Emotionally abused: None     Physically abused: None     Forced sexual activity: None   Other Topics Concern    None   Social History Narrative    None      Pets: None  Travel: No  Patient works at a dental office    Family History:       Problem Relation Age of Onset    Cancer Father     Diabetes Mother     Thyroid Disease Sister    . Otherwise non-pertinent to the chief complaint. REVIEW OF SYSTEMS:    Constitutional: In addition to the fever she has had profuse diaphoresis  Neurologic: Mild headache  Psychiatric: Negative  Rheumatologic: Negative   Endocrine: Negative  Hematologic: As in the HPI  Immunologic: Negative  ENT: Negative  Respiratory: Negative   Cardiovascular: As in the HPI  GI: As in the HPI  : Negative  Musculoskeletal: Negative  Skin: No rashes.      PHYSICAL EXAM:    Vitals:   BP (!) 156/73   Pulse 103   Temp 98 °F (36.7 °C) (Oral)   Resp 18   Ht 5' 5\" (1.651 m)   Wt 184 lb 1.6 oz (83.5 kg)   SpO2 96%   BMI 30.64 kg/m²   Constitutional: The patient is awake, alert, and oriented. Skin: Warm and dry. No rashes were noted. HEENT: Eyes show round, and reactive pupils. No jaundice. Moist mucous membranes, no ulcerations, no thrush. Neck: Supple to movements. No lymphadenopathy. Chest: No use of accessory muscles to breathe. Symmetrical expansion. Auscultation reveals no wheezing, crackles, or rhonchi. Cardiovascular: S1 and S2 are rhythmic and regular. No murmurs appreciated. Abdomen: Positive bowel sounds to auscultation. Benign to palpation. No masses felt. No hepatosplenomegaly. Extremities: No clubbing, no cyanosis, no edema.   Lines: peripheral      CBC+dif:  Recent Labs     02/21/20  1257 02/22/20  0445   WBC 27.1* 32.9*   HGB 11.4* 10.7*   HCT 38.5 35.5   .5* 107.9*   * 604*     Lab Results   Component Value Date    CRP 0.4 02/12/2016      No results found for: CRPHS  Lab Results   Component Value Date    SEDRATE 5 02/12/2016     Lab Results   Component Value Date    ALT 6 12/09/2019    AST 15 12/09/2019    ALKPHOS 105 (H) 12/09/2019    BILITOT <0.2 12/09/2019     Lab Results   Component Value Date     02/22/2020    K 4.2 02/22/2020    K 4.5 12/09/2019     02/22/2020    CO2 20 02/22/2020    BUN 15 02/22/2020    CREATININE 1.4 02/22/2020    GFRAA 45 02/22/2020    LABGLOM 38 02/22/2020    GLUCOSE 96 02/22/2020    PROT 8.2 12/09/2019    LABALBU 3.4 12/09/2019    CALCIUM 8.4 02/22/2020    BILITOT <0.2 12/09/2019    ALKPHOS 105 12/09/2019    AST 15 12/09/2019    ALT 6 12/09/2019       Lab Results   Component Value Date    PROTIME 24.8 02/22/2020    INR 2.1 02/22/2020       No results found for: TSH    Lab Results   Component Value Date    NITRITE neg 10/03/2019    COLORU Yellow 02/21/2020    PHUR 5.0 02/21/2020    LABCAST FEW 08/27/2019    WBCUA 0-1 02/21/2020    RBCUA 0-1 02/21/2020    BACTERIA RARE 02/21/2020    CLARITYU Clear 02/21/2020    SPECGRAV 1.010 02/21/2020    LEUKOCYTESUR Negative 02/21/2020    UROBILINOGEN 0.2 02/21/2020

## 2020-02-23 NOTE — PROGRESS NOTES
[facility]  [address], [address]  [facility], [address] [place] [address]      Pulmonary Progress Note      SUBJECTIVE:  Claims doing ok with breathing, no fever, sitting up in bed eating, claims no current problems with physical activity, on RA    OBJECTIVE    Medications    Continuous Infusions:   sodium chloride 75 mL/hr at 20 2311       Scheduled Meds:   hydroxyurea  1,000 mg Oral Daily    sodium chloride flush  10 mL Intravenous 2 times per day    vitamin D  2,000 Units Oral Daily    [START ON 2020] vitamin D  50,000 Units Oral Once per day on Mon Thu    warfarin  5 mg Oral Daily    lactobacillus  1 capsule Oral Daily    predniSONE  10 mg Oral Daily    sertraline  25 mg Oral Nightly       PRN Meds:HYDROcodone 5 mg - acetaminophen, sodium chloride flush, acetaminophen, ondansetron    Physical    VITALS:  BP (!) 141/81   Pulse 105   Temp 97.4 °F (36.3 °C) (Oral)   Resp 18   Ht 5' 5\" (1.651 m)   Wt 186 lb 11.2 oz (84.7 kg)   SpO2 95%   BMI 31.07 kg/m²     24HR INTAKE/OUTPUT:      Intake/Output Summary (Last 24 hours) at 2020 1152  Last data filed at 2020 0855  Gross per 24 hour   Intake 2942 ml   Output 600 ml   Net 2342 ml       24HR PULSE OXIMETRY RANGE:    SpO2  Av.8 %  Min: 95 %  Max: 97 %    General appearance: alert, appears stated age and cooperative  Lungs: clear to auscultation bilaterally  Heart: regular rate and rhythm, S1, S2 normal, no murmur, click, rub or gallop  Abdomen: soft, non-tender; bowel sounds normal; no masses,  no organomegaly  Extremities: extremities normal, atraumatic, no cyanosis or edema  Neurologic: Mental status: Alert, oriented, thought content appropriate    Data    CBC:   Recent Labs     20  0445 20  1854 20  0625   WBC 32.9* 28.4* 24.7*   HGB 10.7* 9.7* 9.7*   HCT 35.5 32.4* 33.0*   .9* 109.5* 110.0*   * 598* 618*       BMP:  Recent Labs 02/21/20  1257 02/22/20  0445    136   K 4.1 4.2   CL 96* 103   CO2 24 20*   BUN 14 15   CREATININE 1.4* 1.4*    ALB:3,BILIDIR:3,BILITOT:3,ALKPHOS:3)@    PT/INR:   Recent Labs     02/21/20 2005 02/22/20  0445 02/23/20  0625   PROTIME 22.3* 24.8* 28.8*   INR 1.9 2.1 2.4       ABG:   No results for input(s): PH, PO2, PCO2, HCO3, BE, O2SAT, METHB, O2HB, COHB, O2CON, HHB, THB in the last 72 hours. Radiology/Other tests reviewed: none    Assessment:     Active Problems:    Lung mass  Resolved Problems:    * No resolved hospital problems. *  sweet syndrome  Hx of PE on anticoagulation      Plan:       1. Cont with anticoagulation  2. Feeling well, no antibiotics, as per ID  3. Cont with prednisone as current dose  4. On RA and no lung medications, observe  5. Will discuss with Dr Jossy Noel, may consider repeat CT chest in 1-2 months or biopsy (will need off coumadin for this)        Thanks for letting us see this patient in consultation. Please contact us with any questions. Office [phone] or after hours through [employer], x [phone].

## 2020-02-23 NOTE — PROGRESS NOTES
[address] [place] [address] [facility]  Progress Note    CC: feeling much better today    Face to face encounter   SUBJECTIVE:  Patient is tolerating medications. No reported adverse drug reactions. ROS: No nausea, vomiting. Had loose stool after breakfast. No shortness of breath no fevers. Family in room    Medications:  Scheduled Meds:   hydroxyurea  1,000 mg Oral Daily    sodium chloride flush  10 mL Intravenous 2 times per day    vitamin D  2,000 Units Oral Daily    [START ON 2/24/2020] vitamin D  50,000 Units Oral Once per day on Mon Thu    warfarin  5 mg Oral Daily    lactobacillus  1 capsule Oral Daily    predniSONE  10 mg Oral Daily    sertraline  25 mg Oral Nightly     Continuous Infusions:   sodium chloride 75 mL/hr at 02/22/20 2311     PRN Meds:HYDROcodone 5 mg - acetaminophen, sodium chloride flush, acetaminophen, ondansetron  OBJECTIVE:  Patient Vitals for the past 24 hrs:   BP Temp Temp src Pulse Resp SpO2 Weight   02/23/20 0511 -- -- -- -- -- -- 186 lb 11.2 oz (84.7 kg)   02/22/20 2309 122/71 98.4 °F (36.9 °C) Oral 91 16 -- --   02/22/20 1957 -- -- -- -- -- 96 % --   02/22/20 1952 -- -- -- -- -- 97 % --   02/22/20 1557 112/70 98.2 °F (36.8 °C) Oral 92 16 95 % --   02/22/20 0936 (!) 156/73 98 °F (36.7 °C) Oral 103 18 96 % --     Constitutional: The patient is awake, alert, and oriented. Sitting up at bedside. Skin: Warm and dry. No rashes were noted. Head: Eyes show round, and reactive pupils. No jaundice. Mouth: Moist mucous membranes, no ulcerations, no thrush. Neck: Supple to movements. No lymphadenopathy. Chest: No use of accessory muscles to breathe. Symmetrical expansion. Auscultation reveals no wheezing, crackles, or rhonchi. On room air  Cardiovascular: S1 and S2 are rhythmic and regular. No murmurs appreciated. Abdomen: Positive bowel sounds to auscultation. Benign to palpation. No masses felt. No hepatosplenomegaly.   Extremities: No clubbing, no cyanosis, no edema. PIV    Laboratory and Tests Review:  Lab Results   Component Value Date    WBC 24.7 (H) 02/23/2020    WBC 28.4 (H) 02/22/2020    WBC 32.9 (H) 02/22/2020    HGB 9.7 (L) 02/23/2020    HCT 33.0 (L) 02/23/2020    .0 (H) 02/23/2020     (H) 02/23/2020     Lab Results   Component Value Date    NEUTROABS 20.25 (H) 02/23/2020    NEUTROABS 25.28 (H) 02/22/2020    NEUTROABS 5.10 02/14/2020     Lab Results   Component Value Date    CRP 21.8 (H) 02/22/2020    CRP 0.4 02/12/2016     Lab Results   Component Value Date    SEDRATE 93 (H) 02/22/2020    SEDRATE 5 02/12/2016     Lab Results   Component Value Date    ALT 6 12/09/2019    AST 15 12/09/2019    ALKPHOS 105 (H) 12/09/2019    BILITOT <0.2 12/09/2019     Lab Results   Component Value Date     02/22/2020    K 4.2 02/22/2020    K 4.5 12/09/2019     02/22/2020    CO2 20 02/22/2020    BUN 15 02/22/2020    CREATININE 1.4 02/22/2020    GFRAA 45 02/22/2020    LABGLOM 38 02/22/2020    GLUCOSE 96 02/22/2020    PROT 8.2 12/09/2019    LABALBU 3.4 12/09/2019    CALCIUM 8.4 02/22/2020    BILITOT <0.2 12/09/2019    ALKPHOS 105 12/09/2019    AST 15 12/09/2019    ALT 6 12/09/2019     Radiology:  Reviewed     Microbiology:   resp panel- negative     ASSESSMENT:  · Fevers- improved. Cultures were negative. Etiology not established  · Leukocytosis   · History of Sweet syndrome, on steroids  · History of polycythemia vera  · Diarrhea. Possible C. difficile    PLAN:  · Monitor off atbs   · Check cultures  · Monitor labs  · Monitor vitals    Татьяна Coles APR[facility]  9:29 AM  2/23/2020     Patient seen and examined. I had a face to face encounter with the patient. Agree with exam.  Assessment and plan as outlined above and directed by me. Addition and corrections were done as deemed appropriate. My exam and plan include: The patient is certainly feeling better today. No new complaints. All cultures are negative so far.   Continue to observe off antibiotics. If she continues to improve she can be discharged tomorrow.   Discussed with Dr. Franky Strong  2/23/2020

## 2020-02-23 NOTE — PROGRESS NOTES
2339    sodium chloride flush 0.9 % injection 10 mL, 10 mL, Intravenous, 2 times per day, Geovanna Fernández DO, 10 mL at 02/23/20 0855    sodium chloride flush 0.9 % injection 10 mL, 10 mL, Intravenous, PRN, Geovanna Fernández DO    acetaminophen (TYLENOL) tablet 650 mg, 650 mg, Oral, Q4H PRN, Geovanna Fernández DO    0.9 % sodium chloride infusion, , Intravenous, Continuous, Geovanna Fernández DO, Last Rate: 75 mL/hr at 02/22/20 2311    ondansetron (ZOFRAN-ODT) disintegrating tablet 4 mg, 4 mg, Oral, Q8H PRN, Geovanna Fernández DO    vitamin D tablet 2,000 Units, 2,000 Units, Oral, Daily, Radha Kessler MD, 2,000 Units at 02/23/20 0856    [START ON 2/24/2020] vitamin D (ERGOCALCIFEROL) capsule 50,000 Units, 50,000 Units, Oral, Once per day on Mon Thu, Radha Kessler MD    warfarin (COUMADIN) tablet 5 mg, 5 mg, Oral, Daily, Radha Kessler MD, 5 mg at 02/22/20 1846    lactobacillus (CULTURELLE) capsule 1 capsule, 1 capsule, Oral, Daily, Radha Kessler MD, 1 capsule at 02/23/20 1680    predniSONE (DELTASONE) tablet 10 mg, 10 mg, Oral, Daily, Geovanna Fernández DO, 10 mg at 02/23/20 9308    sertraline (ZOLOFT) tablet 25 mg, 25 mg, Oral, Nightly, Geovanna Fernández DO, 25 mg at 02/22/20 1955    Assessment:    Patient Active Problem List   Diagnosis    DVT, recurrent, lower extremity, acute (HCC)    Polycythemia vera ([address]    Venous insufficiency of leg    History of DVT (deep vein thrombosis)    Epistaxis    Septic thrombophlebitis    Bilateral pulmonary embolism ([address]    PE (pulmonary [facility]boembolism) [facility]    Lung mass       Plan:  1. Dyspnea- improved  2. Pleural based nodules- possible Bx, pulmonary on case  3. Acute febrile illness/leukocytosis- no further fever, WBC decreased to 24.7 K. She feels better overall. Doubt active Sweet's syndrome, no skin changes. No evidence of obvious bacterial infection. ? Viral infection. Does have some minor diarrhea. She is being observed off antibiotics. ID on case  4.  Sweet's

## 2020-02-23 NOTE — H&P
[address] [address]                              HISTORY AND PHYSICAL    PATIENT NAME: Ant Jackson                  :        1955  MED REC NO:   [id number]                            ROOM:       3480  ACCOUNT NO:   [de-identified]                           ADMIT DATE: 2020  PROVIDER:     Delonte Galeana MD    DATE OF ADMISSION:  2020    CHIEF COMPLAINT:  Shortness of breath. HISTORY OF PRESENT ILLNESS:  There was an initial report of chest pain,  but in discussion with the patient, this was actually back pain and she  called it spasm in her back. She did not have any actual angina or  pleuritic-type chest pain. She was short of breath and was tachycardic  at the time of her admission. She had a pulmonary embolism back in  2019. This was treated with Lovenox, as this PE had occurred while  the patient was on Xarelto. She also had an IVC filter placed at that  time and that IVC filter remains in place at this time. She was  recently switched from Lovenox over to Coumadin. Her INR was slightly  subtherapeutic on admission at 1.6. Due to obvious concerns about  recurrent PE, a CTA of her chest was ordered. The CTA of her chest did  not reveal any evidence of new pulmonary embolism and demonstrated  resolution of previous noted pulmonary embolism. There was a report of  possible masses in the costophrenic angles of her lungs. There was also  report of some minimal interstitial changes and mild lymphadenopathy. The patient has been on fluctuating doses of Hydrea for essential  thrombocytosis. Review of her lab work showed that her platelet count  recently decreased into the 70s and her Hydrea was significantly reduced  to 500 mg daily. She follows with Dr. Monica Paredes as an outpatient. Presently, her platelet count is back up around 600,000.   It is also  noted that her white count, which presumably related to ______ process  that is causing her fever. It has overall improved. We will continue  to monitor this. 5.  Lung masses. The CT scan reported bilateral costophrenic angle  masses that they felt were suspicious for malignancy. She does not have  any other signs of pneumonia, though she does have an elevated fever and  white count. We will appreciate Pulmonary's input on this report. 6.  Chronic anemia. The patient has a baseline hemoglobin of around 11  and this is stable. 7.  Chronic kidney disease stage III. Her creatinine is around 1.4 and  this is stable.         Sherrie Pavon MD    D: 02/22/2020 12:01:57       T: 02/22/2020 12:13:07     [id number]  Job#: [id number]     Doc#: [id number]    CC:  Mary Johnson DO

## 2020-02-24 NOTE — PROGRESS NOTES
Subjective: The patient is awake and alert up in the chair. Had an episode of shortness of breath this morning, has subsided since. She is worried about her platelet count today. Says her platelets were in the 800,000 range when she ended up with her PE, was on Xarelto at that time. No problems overnight. Denies chest pain, angina, dyspnea or abdominal discomfort. No nausea or vomiting. No fevers or chills. No GI or  blood loss. Tolerating diet. Objective:    /89   Pulse 98   Temp 98 °F (36.7 °C) (Oral)   Resp 18   Ht 5' 5\" (1.651 m)   Wt 186 lb 11.2 oz (84.7 kg)   SpO2 98%   BMI 31.07 kg/m²     General: NAD, pleasant   HEENT: No thrush or mucositis, EOMI, PERRLA  Heart:  RRR, no murmurs, gallops, or rubs.   Lungs:  CTA bilaterally, no wheeze, rales or rhonchi  Abd: bowel sounds present, nontender, nondistended, no masses  Extrem:  No clubbing, cyanosis, or edema  Lymphatics: No palpable adenopathy in cervical and supraclavicular regions  Skin: Intact, no petechia or purpura    CBC with Differential:    Lab Results   Component Value Date    WBC 19.5 02/24/2020    RBC 3.03 02/24/2020    HGB 9.8 02/24/2020    HCT 33.0 02/24/2020     02/24/2020    .9 02/24/2020    MCH 32.3 02/24/2020    MCHC 29.7 02/24/2020    RDW 22.2 02/24/2020    NRBC 0.0 02/23/2020    SEGSPCT 82 12/29/2013    BLASTSPCT 1.0 12/10/2019    METASPCT 0.9 02/22/2020    LYMPHOPCT 9.0 02/24/2020    MONOPCT 5.0 02/24/2020    MYELOPCT 2.0 12/08/2019    BASOPCT 2.0 02/24/2020    MONOSABS 0.98 02/24/2020    LYMPHSABS 1.76 02/24/2020    EOSABS 0.00 02/24/2020    BASOSABS 0.39 02/24/2020     CMP:    Lab Results   Component Value Date     02/22/2020    K 4.2 02/22/2020    K 4.5 12/09/2019     02/22/2020    CO2 20 02/22/2020    BUN 15 02/22/2020    CREATININE 1.4 02/22/2020    GFRAA 45 02/22/2020    LABGLOM 38 02/22/2020    GLUCOSE 96 02/22/2020    PROT 8.2 12/09/2019    LABALBU 3.4 12/09/2019    CALCIUM 8.4 02/22/2020    BILITOT <0.2 12/09/2019    ALKPHOS 105 12/09/2019    AST 15 12/09/2019    ALT 6 12/09/2019         Current Facility-Administered Medications:     HYDROcodone-acetaminophen (NORCO) 5-325 MG per tablet 1 tablet, 1 tablet, Oral, Q6H PRN, Ira Barron MD, 1 tablet at 02/22/20 2860    hydroxyurea (HYDREA) chemo capsule 1,000 mg, 1,000 mg, Oral, Daily, Melisa Murillo MD, 1,000 mg at 02/24/20 0912    sodium chloride flush 0.9 % injection 10 mL, 10 mL, Intravenous, 2 times per day, Derek Scott DO, 10 mL at 02/24/20 0900    sodium chloride flush 0.9 % injection 10 mL, 10 mL, Intravenous, PRN, Derek Hunteroner DO    acetaminophen (TYLENOL) tablet 650 mg, 650 mg, Oral, Q4H PRN, Derek Ratliffr DO    ondansetron (ZOFRAN-ODT) disintegrating tablet 4 mg, 4 mg, Oral, Q8H PRN, Derek Hunteronecassandra DO    vitamin D tablet 2,000 Units, 2,000 Units, Oral, Daily, Breanna Hernandez MD, 2,000 Units at 02/24/20 0910    vitamin D (ERGOCALCIFEROL) capsule 50,000 Units, 50,000 Units, Oral, Once per day on Mon Thu, Breanna Hernandez MD, 50,000 Units at 02/24/20 2006    warfarin (COUMADIN) tablet 5 mg, 5 mg, Oral, Daily, Breanna Hernandez MD, 5 mg at 02/23/20 1752    lactobacillus (CULTURELLE) capsule 1 capsule, 1 capsule, Oral, Daily, Breanna Hernandez MD, 1 capsule at 02/24/20 0910    predniSONE (DELTASONE) tablet 10 mg, 10 mg, Oral, Daily, Derek Scott DO, 10 mg at 02/24/20 6712    sertraline (ZOLOFT) tablet 25 mg, 25 mg, Oral, Nightly, Derek Scott DO, 25 mg at 02/23/20 2117    Assessment:    Active Problems:    Lung mass  Resolved Problems:    * No resolved hospital problems. *    77 year old female known to [facility] with myeloproliferative disorder with leukocytosis/thrombocytosis/polycythemia diagnosed in 2007. She has been on hydrea for a long time and it was recently held when her platelet count dropped in the 70,000 range this month. She was placed on coumadin for prior PE (transitioned from lovenox).

## 2020-02-24 NOTE — PROGRESS NOTES
Pulmonary Progress Note    Admit Date: 2020  Hospital day                               PCP: Jorge Echevarria DO    Chief Complaint (s):  Patient Active Problem List   Diagnosis    DVT, recurrent, lower extremity, acute ([address]    Polycythemia vera ([address]    Venous insufficiency of leg    History of DVT (deep vein thrombosis)    Epistaxis    Septic thrombophlebitis    Bilateral pulmonary embolism ([address]    PE (pulmonary thromboembolism) ([address]    Lung mass       Subjective:  · Brent Varela is off the floor for testing this a.m. Entire chart is reviewed. Films are below. Vitals:  VITALS:  BP (!) 149/83   Pulse 98   Temp 98 °F (36.7 °C) (Oral)   Resp 18   Ht 5' 5\" (1.651 m)   Wt 186 lb 11.2 oz (84.7 kg)   SpO2 98%   BMI 31.07 kg/m²     24HR INTAKE/OUTPUT:      Intake/Output Summary (Last 24 hours) at 2020 1051  Last data filed at 2020 0194  Gross per 24 hour   Intake 300 ml   Output --   Net 300 ml       24HR PULSE OXIMETRY RANGE:    SpO2  Av.5 %  Min: 95 %  Max: 98 %    Medications:  IV:      Scheduled Meds:   hydroxyurea  1,000 mg Oral Daily    sodium chloride flush  10 mL Intravenous 2 times per day    vitamin D  2,000 Units Oral Daily    vitamin D  50,000 Units Oral Once per day on Mon Thu    warfarin  5 mg Oral Daily    lactobacillus  1 capsule Oral Daily    predniSONE  10 mg Oral Daily    sertraline  25 mg Oral Nightly       Diet:   DIET GENERAL;     EXAM:  General: No distress. Alert. Eyes: PERRL. No sclera icterus. No conjunctival injection. ENT: No discharge. Pharynx clear. Neck: Trachea midline. Normal thyroid. Resp: No accessory muscle use. No rales. No wheezing. No rhonchi. CV: Regular rate. Regular rhythm. No murmur or rub. Abd: Non-tender. Non-distended. No masses. No organomegaly. Normal bowel sounds. Skin: Warm and dry. No nodule on exposed extremities. No rash on exposed extremities.   Ext: No cyanosis, clubbing, edema  Lymph: No cervical LAD. No supraclavicular LAD. M/S: No cyanosis. No joint deformity. No clubbing. Neuro: Awake. Follows commands. Positive pupils/gag/corneals. Normal pain response. Results:  CBC:   Recent Labs     02/22/20 1854 02/23/20  0625 02/24/20  0720   WBC 28.4* 24.7* 19.5*   HGB 9.7* 9.7* 9.8*   HCT 32.4* 33.0* 33.0*   .5* 110.0* 108.9*   * 618* 814*     BMP:   Recent Labs     02/21/20 1257 02/22/20  0445    136   K 4.1 4.2   CL 96* 103   CO2 24 20*   BUN 14 15   CREATININE 1.4* 1.4*     LIVER PROFILE: No results for input(s): AST, ALT, LIPASE, BILIDIR, BILITOT, ALKPHOS in the last 72 hours. Invalid input(s): AMYLASE,  ALB  PT/INR:   Recent Labs     02/21/20 2005 02/22/20 0445 02/23/20  0625   PROTIME 22.3* 24.8* 28.8*   INR 1.9 2.1 2.4     APTT: No results for input(s): APTT in the last 72 hours. Pathology:  1. N/A      Microbiology:  1. None    Recent ABG:   No results for input(s): PH, PO2, PCO2, HCO3, BE, O2SAT, METHB, O2HB, COHB, O2CON, HHB, THB in the last 72 hours. Recent Films:  CTA PULMONARY W CONTRAST   Final Result      Resolution of bilateral pulmonary embolism. Persistent consolidation within the bilateral costophrenic angles   which is becoming suspicious for underlying malignancy. Further workup   is recommended. Consider PET scan for further characterization. Increasing mediastinal lymphadenopathy. Sliding hiatal hernia. Mild diffuse interstitial lung disease. IMPRESSION:      No evidence for pulmonary embolism or aortic dissection. No evidence for acute process on CTA of the chest.          Assessment:  1. Right-sided and left-sided pleural changes: These appear to be stable from September of last year and may reflect residua from pulmonary emboli. There are no new pulmonary emboli. 2. Sweet syndrome  3. Myelodysplastic syndrome    Plan:  1. Consider a CT scan of the chest in 6 months.     Care reviewed with the staff and the patient's family as available. Please note that voice recognition technology was used in the preparation of this note and make therefore it may contain inadvertent transcription errors. Abbie Guthrie M.D., F.C.C.P.     [facility], [address] [place] [address], [address] [place] [address], [facility]

## 2020-02-24 NOTE — PROGRESS NOTES
Subjective: The patient is awake and alert. transient shortness of breath yesterday  Objective:    /89   Pulse 90   Temp 97.2 °F (36.2 °C) (Oral)   Resp 20   Ht 5' 5\" (1.651 m)   Wt 186 lb 11.2 oz (84.7 kg)   SpO2 95%   BMI 31.07 kg/m²     Heart:  RRR, no murmurs, gallops, or rubs.   Lungs:  CTA bilaterally, no wheeze, rales or rhonchi  Abd: bowel sounds present, nontender, nondistended, no masses  Extrem:  No clubbing, cyanosis, or edema    CBC with Differential:    Lab Results   Component Value Date    WBC 24.7 02/23/2020    RBC 3.00 02/23/2020    HGB 9.7 02/23/2020    HCT 33.0 02/23/2020     02/23/2020    .0 02/23/2020    MCH 32.3 02/23/2020    MCHC 29.4 02/23/2020    RDW 22.8 02/23/2020    NRBC 0.0 02/23/2020    SEGSPCT 82 12/29/2013    BLASTSPCT 1.0 12/10/2019    METASPCT 0.9 02/22/2020    LYMPHOPCT 6.1 02/23/2020    MONOPCT 11.3 02/23/2020    MYELOPCT 2.0 12/08/2019    BASOPCT 0.9 02/23/2020    MONOSABS 2.72 02/23/2020    LYMPHSABS 1.48 02/23/2020    EOSABS 0.00 02/23/2020    BASOSABS 0.22 02/23/2020     CMP:    Lab Results   Component Value Date     02/22/2020    K 4.2 02/22/2020    K 4.5 12/09/2019     02/22/2020    CO2 20 02/22/2020    BUN 15 02/22/2020    CREATININE 1.4 02/22/2020    GFRAA 45 02/22/2020    LABGLOM 38 02/22/2020    GLUCOSE 96 02/22/2020    PROT 8.2 12/09/2019    LABALBU 3.4 12/09/2019    CALCIUM 8.4 02/22/2020    BILITOT <0.2 12/09/2019    ALKPHOS 105 12/09/2019    AST 15 12/09/2019    ALT 6 12/09/2019     PT/INR:    Lab Results   Component Value Date    PROTIME 28.8 02/23/2020    INR 2.4 02/23/2020     @cktotal:3,ckmb:3,ckmbindex:3,troponini:3@  U/A:    Lab Results   Component Value Date    NITRU Negative 02/21/2020    COLORU Yellow 02/21/2020    PHUR 5.0 02/21/2020    LABCAST FEW 08/27/2019    WBCUA 0-1 02/21/2020    RBCUA 0-1 02/21/2020    BACTERIA RARE 02/21/2020    CLARITYU Clear 02/21/2020    SPECGRAV 1.010 02/21/2020    UROBILINOGEN 0.2

## 2020-02-24 NOTE — CARE COORDINATION
CM met with pt at bedside to discuss role, anticipated LOS & current plan of care. Reports being independent & lives in 1 story home with . Does not use/need equipment to get around & does not use O2. No hx JOHAN & reports having [facility] home care when she had her 1st blood clot. Discussed DX & was using lovenox at home until 3 weeks ago when she was switched to coumadin. Reports having filter & declines need for home following this admission. Will continue to follow.

## 2020-02-24 NOTE — PROGRESS NOTES
02/24/2020    HCT 33.0 (L) 02/24/2020    .9 (H) 02/24/2020     (H) 02/24/2020     Lab Results   Component Value Date    NEUTROABS 16.38 (H) 02/24/2020    NEUTROABS 20.25 (H) 02/23/2020    NEUTROABS 25.28 (H) 02/22/2020     Lab Results   Component Value Date    CRP 21.8 (H) 02/22/2020    CRP 0.4 02/12/2016     Lab Results   Component Value Date    SEDRATE 93 (H) 02/22/2020    SEDRATE 5 02/12/2016     Lab Results   Component Value Date    ALT 6 12/09/2019    AST 15 12/09/2019    ALKPHOS 105 (H) 12/09/2019    BILITOT <0.2 12/09/2019     Lab Results   Component Value Date     02/22/2020    K 4.2 02/22/2020    K 4.5 12/09/2019     02/22/2020    CO2 20 02/22/2020    BUN 15 02/22/2020    CREATININE 1.4 02/22/2020    GFRAA 45 02/22/2020    LABGLOM 38 02/22/2020    GLUCOSE 96 02/22/2020    PROT 8.2 12/09/2019    LABALBU 3.4 12/09/2019    CALCIUM 8.4 02/22/2020    BILITOT <0.2 12/09/2019    ALKPHOS 105 12/09/2019    AST 15 12/09/2019    ALT 6 12/09/2019     Radiology:  Reviewed     Microbiology:   Respiratory panel: Negative    ASSESSMENT:  · Fevers. Etiology not established. They have resolved. This could have been a viral infection, not detected on a respiratory panel. Cultures were negative. · Leukocytosis associated to myeloproliferative disorder. On Hydrea. WBC coming down.   Platelets increasing  · History of Sweet syndrome, on steroids  · History of polycythemia vera  · Diarrhea associated to the above, resolving    PLAN:  · Continue to observe off antibiotics    Discussed with daylin Shirley  2:46 PM  2/24/2020

## 2020-02-25 NOTE — PROGRESS NOTES
Pulmonary Progress Note    Admit Date: 2020  Hospital day                               PCP: Viviane De La Cruz DO    Chief Complaint (s):  Patient Active Problem List   Diagnosis    DVT, recurrent, lower extremity, acute ([address]    Polycythemia vera ([address]    Venous insufficiency of leg    History of DVT (deep vein thrombosis)    Epistaxis    Septic thrombophlebitis    Bilateral pulmonary embolism ([address]    PE (pulmonary thromboembolism) ([address]    Lung mass       Subjective:  · Soni Carlos is awake and alert this a.m. Breathing comfortably. Films are reviewed with the patient. Vitals:  VITALS:  BP (!) 183/83   Pulse 112   Temp 99.8 °F (37.7 °C) (Oral)   Resp 18   Ht 5' 5\" (1.651 m)   Wt 185 lb (83.9 kg)   SpO2 93%   BMI 30.79 kg/m²     24HR INTAKE/OUTPUT:      Intake/Output Summary (Last 24 hours) at 2020 1133  Last data filed at 2020 0964  Gross per 24 hour   Intake 480 ml   Output --   Net 480 ml       24HR PULSE OXIMETRY RANGE:    SpO2  Av %  Min: 93 %  Max: 97 %    Medications:  IV:      Scheduled Meds:   [START ON 2020] hydroxyurea  1,500 mg Oral Daily    sodium chloride flush  10 mL Intravenous 2 times per day    vitamin D  2,000 Units Oral Daily    vitamin D  50,000 Units Oral Once per day on Mon Thu    warfarin  5 mg Oral Daily    lactobacillus  1 capsule Oral Daily    predniSONE  10 mg Oral Daily    sertraline  25 mg Oral Nightly       Diet:   DIET GENERAL;     EXAM:  General: No distress. Alert. Eyes: PERRL. No sclera icterus. No conjunctival injection. ENT: No discharge. Pharynx clear. Neck: Trachea midline. Normal thyroid. Resp: No accessory muscle use. No rales. No wheezing. No rhonchi. CV: Regular rate. Regular rhythm. No murmur or rub. Abd: Non-tender. Non-distended. No masses. No organomegaly. Normal bowel sounds. Skin: Warm and dry. No nodule on exposed extremities. No rash on exposed extremities.   Ext: No cyanosis, clubbing, edema  Lymph: No cervical LAD. No supraclavicular LAD. M/S: No cyanosis. No joint deformity. No clubbing. Neuro: Awake. Follows commands. Positive pupils/gag/corneals. Normal pain response. Results:  CBC:   Recent Labs     02/23/20  0625 02/24/20  0720 02/25/20  0635   WBC 24.7* 19.5* 19.0*   HGB 9.7* 9.8* 10.3*   HCT 33.0* 33.0* 34.6   .0* 108.9* 108.5*   * 814* 930*     BMP:   Recent Labs     02/25/20  0752      K 3.4*      CO2 23   BUN 14   CREATININE 1.3*     LIVER PROFILE:   Recent Labs     02/25/20  0752   AST 17   ALT <5   BILITOT <0.2   ALKPHOS 105*     PT/INR:   Recent Labs     02/23/20  0625   PROTIME 28.8*   INR 2.4     APTT: No results for input(s): APTT in the last 72 hours. Pathology:  1. N/A      Microbiology:  1. None    Recent ABG:   No results for input(s): PH, PO2, PCO2, HCO3, BE, O2SAT, METHB, O2HB, COHB, O2CON, HHB, THB in the last 72 hours. Recent Films:  CTA PULMONARY W CONTRAST   Final Result      Resolution of bilateral pulmonary embolism. Persistent consolidation within the bilateral costophrenic angles   which is becoming suspicious for underlying malignancy. Further workup   is recommended. Consider PET scan for further characterization. Increasing mediastinal lymphadenopathy. Sliding hiatal hernia. Mild diffuse interstitial lung disease. IMPRESSION:      No evidence for pulmonary embolism or aortic dissection. No evidence for acute process on CTA of the chest.          Assessment:  1. Right-sided and left-sided pleural changes: These appear to be stable from September of last year and may reflect residua from pulmonary emboli. There are no new pulmonary emboli. 2. Sweet syndrome  3. Myelodysplastic syndrome    Plan:  1. Consider a CT scan of the chest in 6 months. Care reviewed with the staff and the patient's family as available.      Please note that voice recognition technology was used in the

## 2020-02-25 NOTE — PROGRESS NOTES
[facility] Quality Flow/Interdisciplinary Rounds Progress Note        Quality Flow Rounds held on February 25, 2020    Disciplines Attending:  Bedside Nurse, ,  and Nursing Unit Leadership    Dheeraj De La Cruz was admitted on 2/21/2020 12:35 PM    Anticipated Discharge Date:  Expected Discharge Date: 02/22/20(estimate)    Disposition:    Brent Score:  Brent Scale Score: 22    Readmission Risk              Risk of Unplanned Readmission:        17           Discussed patient goal for the day, patient clinical progression, and barriers to discharge. The following Goal(s) of the Day/Commitment(s) have been identified:  Check echocardiogram, check hem-oc plan.       [facility]  February 25, 2020

## 2020-02-25 NOTE — PROGRESS NOTES
[address] [place] [address] [facility]  Progress Note    CC: feeling much better today    Face to face encounter   SUBJECTIVE:  The patient had an episode of dyspnea. She was given diuretics. Altogether she feels better. No nausea or vomiting. No chest discomfort. Review of systems: As above, otherwise unremarkable. Medications:  Scheduled Meds:   [START ON 2/26/2020] hydroxyurea  1,500 mg Oral Daily    potassium chloride  20 mEq Oral BID WC    sodium chloride flush  10 mL Intravenous 2 times per day    vitamin D  2,000 Units Oral Daily    vitamin D  50,000 Units Oral Once per day on Mon Thu    warfarin  5 mg Oral Daily    lactobacillus  1 capsule Oral Daily    predniSONE  10 mg Oral Daily    sertraline  25 mg Oral Nightly     Continuous Infusions:    PRN Meds:HYDROcodone 5 mg - acetaminophen, sodium chloride flush, acetaminophen, ondansetron  OBJECTIVE:  Patient Vitals for the past 24 hrs:   BP Temp Temp src Pulse Resp SpO2 Weight   02/25/20 1305 (!) 151/88 -- -- 111 -- -- --   02/25/20 0739 (!) 183/83 99.8 °F (37.7 °C) Oral 112 18 93 % --   02/25/20 0607 -- -- -- -- -- -- 185 lb (83.9 kg)   02/24/20 2340 (!) 170/93 96.1 °F (35.6 °C) Oral (!) 18 18 97 % --   02/24/20 1649 (!) 150/84 98.5 °F (36.9 °C) Oral 90 16 -- --     Constitutional: The patient is awake, alert, and oriented. Sitting up at bedside. Skin: Warm and dry. No rashes were noted. Head: Eyes show round, and reactive pupils. No jaundice. Mouth: Moist mucous membranes, no ulcerations, no thrush. Neck: Supple. Chest: No use of accessory muscles to breathe. Symmetrical expansion. Auscultation reveals crackles on bases posteriorly. Cardiovascular: Heart sounds rhythmic and regular. Abdomen: Soft and benign to palpation. Positive bowel sounds. Extremities: No edema.   PIV    Laboratory and Tests Review:  Lab Results   Component Value Date    WBC 19.0 (H) 02/25/2020    WBC 19.5 (H) 02/24/2020    WBC 24.7 (H) 02/23/2020    HGB 10.3 (L) 02/25/2020    HCT 34.6 02/25/2020    .5 (H) 02/25/2020     (H) 02/25/2020     Lab Results   Component Value Date    NEUTROABS 16.34 (H) 02/25/2020    NEUTROABS 16.38 (H) 02/24/2020    NEUTROABS 20.25 (H) 02/23/2020     Lab Results   Component Value Date    CRP 21.8 (H) 02/22/2020    CRP 0.4 02/12/2016     Lab Results   Component Value Date    SEDRATE 93 (H) 02/22/2020    SEDRATE 5 02/12/2016     Lab Results   Component Value Date    ALT <5 02/25/2020    AST 17 02/25/2020    ALKPHOS 105 (H) 02/25/2020    BILITOT <0.2 02/25/2020     Lab Results   Component Value Date     02/25/2020    K 3.4 02/25/2020    K 4.5 12/09/2019     02/25/2020    CO2 23 02/25/2020    BUN 14 02/25/2020    CREATININE 1.3 02/25/2020    GFRAA 50 02/25/2020    LABGLOM 41 02/25/2020    GLUCOSE 83 02/25/2020    PROT 8.9 02/25/2020    LABALBU 3.5 02/25/2020    CALCIUM 9.1 02/25/2020    BILITOT <0.2 02/25/2020    ALKPHOS 105 02/25/2020    AST 17 02/25/2020    ALT <5 02/25/2020     Radiology:  Reviewed     Microbiology:   Respiratory panel: Negative    ASSESSMENT:  · Fevers. Etiology not established. They have resolved. This could have been a viral infection, not detected on a respiratory panel. Cultures were negative. · Leukocytosis with thrombocytosis associated to myeloproliferative disorder. On Hydrea. WBC coming down.     · History of Sweet syndrome, on steroids  · History of polycythemia vera  · Diarrhea associated to the above, resolving    PLAN:  · Continue to observe off antibiotics  · 2D echo    Lizzeth Bergman  2:35 PM  2/25/2020

## 2020-02-25 NOTE — PROGRESS NOTES
changes. Leukocytosis and thrombocytosis may be due to infection but also due to hydrea being held recently. Pulmonary consulted for the consolidative opacities to see if any suspicion for underlying malignancy, previously changes in December felt to be possibly due to pulmonary infarcts from PE. Fevers have resolved. Viral panel negative. Plan: Will increase Hydrea to 1500 mg daily, platelets up to 663 today. CBC with diff daily  Has hx of PE, continue coumadin   I.D following patient off ABX. Pt having some SOB with palpitations earlier this morning, 2D Echo ordered per primary along with labs and ambulatory pulse ox. proBNP was 2,842, was 314 December 2019.    Will continue to follow.        Electronically signed by ALFREDA Ignacio NP on 2/25/2020 at 10:57 AM

## 2020-02-26 NOTE — PROGRESS NOTES
[facility] Quality Flow/Interdisciplinary Rounds Progress Note        Quality Flow Rounds held on February 26, 2020    Disciplines Attending:  Bedside Nurse, ,  and Nursing Unit Leadership    Petr Holt was admitted on 2/21/2020 12:35 PM    Anticipated Discharge Date:  Expected Discharge Date: 02/22/20(estimate)    Disposition:    Brent Score:  Brent Scale Score: 23    Readmission Risk              Risk of Unplanned Readmission:        20           Discussed patient goal for the day, patient clinical progression, and barriers to discharge.   The following Goal(s) of the Day/Commitment(s) have been identified:  Other  Check with hemoc, poss discharge       Douglas Bowie  February 26, 2020

## 2020-02-26 NOTE — PROGRESS NOTES
[address] [place] [address] [facility]  [facility]  Progress Note    CC: feeling much better today    Face to face encounter   SUBJECTIVE:  The patient feels very good today. No dyspnea. No cough. No fevers. Review of systems: As above, otherwise unremarkable. Medications:  Scheduled Meds:   hydroxyurea  1,500 mg Oral Daily    sodium chloride flush  10 mL Intravenous 2 times per day    vitamin D  2,000 Units Oral Daily    vitamin D  50,000 Units Oral Once per day on Mon Thu    warfarin  5 mg Oral Daily    lactobacillus  1 capsule Oral Daily    predniSONE  10 mg Oral Daily    sertraline  25 mg Oral Nightly     Continuous Infusions:    PRN Meds:HYDROcodone 5 mg - acetaminophen, sodium chloride flush, acetaminophen, ondansetron  OBJECTIVE:  Patient Vitals for the past 24 hrs:   BP Temp Temp src Pulse Resp SpO2   02/26/20 0900 119/75 97.7 °F (36.5 °C) Oral 106 16 93 %   02/25/20 2353 124/69 98.3 °F (36.8 °C) Oral 95 16 93 %   02/25/20 2001 -- 98.1 °F (36.7 °C) Oral -- -- --   02/25/20 1532 (!) 139/95 98.3 °F (36.8 °C) Oral 106 18 --     Constitutional: The patient is awake, alert, and oriented. Sitting in chair. Skin: Warm and dry. No rashes were noted. Head: Eyes show round, and reactive pupils. No jaundice. Mouth: Moist mucous membranes, no ulcerations, no thrush. Neck: Supple. Chest: No use of accessory muscles to breathe. Symmetrical expansion. No crackles. Cardiovascular: Heart sounds rhythmic and regular. Abdomen: Soft and benign to palpation. Positive bowel sounds. Extremities: No edema.   PIV    Laboratory and Tests Review:  Lab Results   Component Value Date    WBC 22.8 (H) 02/26/2020    WBC 19.0 (H) 02/25/2020    WBC 19.5 (H) 02/24/2020    HGB 11.3 (L) 02/26/2020    HCT 37.7 02/26/2020    .5 (H) 02/26/2020    PLT 1,177 (H) 02/26/2020     Lab Results   Component Value Date    NEUTROABS 16.87 (H) 02/26/2020    NEUTROABS 16.34 (H) 02/25/2020    NEUTROABS 16.38 (H) 02/24/2020

## 2020-02-26 NOTE — PROGRESS NOTES
recognition technology was used in the preparation of this note and make therefore it may contain inadvertent transcription errors. Aba Montana M.D., FLULA.     [facility], [address]

## 2020-02-26 NOTE — PROGRESS NOTES
Elevated INR 6  INR ? 3.5 Notification: Pharmacy Progress Note    Recent Labs     02/26/20  0542   INR 6.0*       INR Goal: 2 - 3 (ACCP recommended for VTE (DVT, PE, etc.) A fib, AMI, etc)    Warfarin Indication: DVT    Your patient is currently taking the following drugs that may cause a drug interaction (annotated with X):      x Home Med?  Meds Increasing INR     Allopurinol      Amiodarone/Propafenone/Dronedarone     Androgens     Chemotherapy (BBW: Capecitabine)     Ciprofloxacin/Levofloxacin     Clarithromycin/Erythromycin/Azithromycin     Fluconazole/Itraconazole/Voriconazole/Ketoconazole     Metronidazole     Phenytoin (Variable)      Statins/Fenofibrate/Gemfibrozil   X X Steroids (Variable/Dose Dependent)     Sulfamethoxazole/Trimethoprim     Tramadol    X X Other: SERTRALINE       Comments regarding medication interactions:             Comments     Hold coumadin 2/26-Dr. Antoinette De León         (Please be aware that most other anticoagulants can cause an increase in the INR.)      Snicere Michel RP, [facility]  2/26/2020 3:21 PM

## 2020-02-26 NOTE — PROGRESS NOTES
Elevated INR 6  INR ? 3.5 Notification: Pharmacy Progress Note    Recent Labs     02/26/20  0542   INR 6.0*       INR Goal: 2 - 3 (ACCP recommended for VTE (DVT, PE, etc.) A fib, AMI, etc)    Warfarin Indication: DVT    Your patient is currently taking the following drugs that may cause a drug interaction (annotated with X):      x Home Med?  Meds Increasing INR     Allopurinol      Amiodarone/Propafenone/Dronedarone     Androgens     Chemotherapy (BBW: Capecitabine)     Ciprofloxacin/Levofloxacin     Clarithromycin/Erythromycin/Azithromycin     Fluconazole/Itraconazole/Voriconazole/Ketoconazole     Metronidazole     Phenytoin (Variable)      Statins/Fenofibrate/Gemfibrozil   X X Steroids (Variable/Dose Dependent)     Sulfamethoxazole/Trimethoprim     Tramadol    X X Other: SERTRALINE       Comments regarding medication interactions:             Comments     Hold coumadin 2/26-Dr. Gibran Ty         (Please be aware that most other anticoagulants can cause an increase in the INR.)      Rhina Jackson formerly [facility], [facility]  2/26/2020 3:21 PM

## 2020-02-26 NOTE — PROGRESS NOTES
Subjective: The patient is awake and alert. No problems overnight. Denies chest pain, angina, and dyspnea. Denies abdominal pain. Tolerating diet. No nausea or vomiting. Objective:    /69   Pulse 95   Temp 98.3 °F (36.8 °C) (Oral)   Resp 16   Ht 5' 5\" (1.651 m)   Wt 185 lb (83.9 kg)   SpO2 93%   BMI 30.79 kg/m²     Heart:  RRR, no murmurs, gallops, or rubs.   Lungs:  CTA bilaterally, no wheeze, rales or rhonchi  Abd: bowel sounds present, nontender, nondistended, no masses  Extrem:  No clubbing, cyanosis, or edema    CBC with Differential:    Lab Results   Component Value Date    WBC 19.0 02/25/2020    RBC 3.19 02/25/2020    HGB 10.3 02/25/2020    HCT 34.6 02/25/2020     02/25/2020    .5 02/25/2020    MCH 32.3 02/25/2020    MCHC 29.8 02/25/2020    RDW 22.0 02/25/2020    NRBC 1.0 02/25/2020    SEGSPCT 82 12/29/2013    BLASTSPCT 1.0 12/10/2019    METASPCT 0.0 02/25/2020    LYMPHOPCT 7.0 02/25/2020    MONOPCT 4.0 02/25/2020    MYELOPCT 2.0 12/08/2019    BASOPCT 2.0 02/25/2020    MONOSABS 0.76 02/25/2020    LYMPHSABS 1.52 02/25/2020    EOSABS 0.00 02/25/2020    BASOSABS 0.38 02/25/2020     CMP:    Lab Results   Component Value Date     02/25/2020    K 3.4 02/25/2020    K 4.5 12/09/2019     02/25/2020    CO2 23 02/25/2020    BUN 14 02/25/2020    CREATININE 1.3 02/25/2020    GFRAA 50 02/25/2020    LABGLOM 41 02/25/2020    GLUCOSE 83 02/25/2020    PROT 8.9 02/25/2020    LABALBU 3.5 02/25/2020    CALCIUM 9.1 02/25/2020    BILITOT <0.2 02/25/2020    ALKPHOS 105 02/25/2020    AST 17 02/25/2020    ALT <5 02/25/2020     PT/INR:    Lab Results   Component Value Date    PROTIME 28.8 02/23/2020    INR 2.4 02/23/2020     @cktotal:3,ckmb:3,ckmbindex:3,troponini:3@  U/A:    Lab Results   Component Value Date    NITRU Negative 02/21/2020    COLORU Yellow 02/21/2020    PHUR 5.0 02/21/2020    LABCAST FEW 08/27/2019    WBCUA 0-1 02/21/2020    RBCUA 0-1 02/21/2020    BACTERIA RARE

## 2020-02-27 NOTE — PROGRESS NOTES
Pt states that if she is taken off of coumadin she would prefer to be placed on eliquis. She reports that she was on xarelto in the past and got clots in the lungs and kidney and was told by a vascular surgeon that \"if you're on 20 mg of xarelto you have to take it with food or it doesn't work. \"  Also states that lovenox was tried as well, which led to multiple lump in her skin all over her body which needed to be Ultrasounded by Dr. Kassandra Anderson - results still pending.

## 2020-02-27 NOTE — PROGRESS NOTES
Subjective: The patient is awake and alert. No problems overnight. Denies chest pain, angina, and dyspnea. Denies abdominal pain. Tolerating diet. No nausea or vomiting. Objective:    BP (!) 140/88   Pulse 91   Temp 98.3 °F (36.8 °C) (Oral)   Resp 16   Ht 5' 5\" (1.651 m)   Wt 182 lb 4.8 oz (82.7 kg)   SpO2 94%   BMI 30.34 kg/m²     Heart:  RRR, no murmurs, gallops, or rubs.   Lungs:  CTA bilaterally, no wheeze, rales or rhonchi  Abd: bowel sounds present, nontender, nondistended, no masses  Extrem:  No clubbing, cyanosis, or edema    CBC with Differential:    Lab Results   Component Value Date    WBC 25.0 02/27/2020    RBC 3.44 02/27/2020    HGB 11.2 02/27/2020    HCT 37.1 02/27/2020    PLT 1,258 02/27/2020    .8 02/27/2020    MCH 32.6 02/27/2020    MCHC 30.2 02/27/2020    RDW 21.9 02/27/2020    NRBC 0.0 02/26/2020    SEGSPCT 82 12/29/2013    BLASTSPCT 1.0 12/10/2019    METASPCT 0.0 02/25/2020    LYMPHOPCT 8.8 02/26/2020    MONOPCT 15.0 02/26/2020    MYELOPCT 2.0 12/08/2019    BASOPCT 2.7 02/26/2020    MONOSABS 3.42 02/26/2020    LYMPHSABS 2.05 02/26/2020    EOSABS 0.00 02/26/2020    BASOSABS 0.62 02/26/2020     CMP:    Lab Results   Component Value Date     02/26/2020    K 3.8 02/26/2020    K 4.5 12/09/2019    CL 96 02/26/2020    CO2 25 02/26/2020    BUN 16 02/26/2020    CREATININE 1.4 02/26/2020    GFRAA 45 02/26/2020    LABGLOM 38 02/26/2020    GLUCOSE 86 02/26/2020    PROT 8.1 02/26/2020    LABALBU 3.5 02/26/2020    CALCIUM 9.3 02/26/2020    BILITOT <0.2 02/26/2020    ALKPHOS 101 02/26/2020    AST 17 02/26/2020    ALT <5 02/26/2020     PT/INR:    Lab Results   Component Value Date    PROTIME 51.9 02/27/2020    INR 4.2 02/27/2020     @cktotal:3,ckmb:3,ckmbindex:3,troponini:3@  U/A:    Lab Results   Component Value Date    NITRU Negative 02/21/2020    COLORU Yellow 02/21/2020    PHUR 5.0 02/21/2020    LABCAST FEW 08/27/2019    WBCUA 0-1 02/21/2020    RBCUA 0-1 02/21/2020    BACTERIA RARE 02/21/2020    CLARITYU Clear 02/21/2020    SPECGRAV 1.010 02/21/2020    UROBILINOGEN 0.2 02/21/2020    BILIRUBINUR Negative 02/21/2020    BILIRUBINUR neg 10/03/2019    BLOODU TRACE-INTACT 02/21/2020    GLUCOSEU Negative 02/21/2020    KETUA Negative 02/21/2020        Assessment:    Patient Active Problem List   Diagnosis    DVT, recurrent, lower extremity, acute (HCC)    Polycythemia vera ([address]    Venous insufficiency of leg    History of DVT (deep vein thrombosis)    Epistaxis    Septic thrombophlebitis    Bilateral pulmonary embolism ([address]    PE (pulmonary thromboembolism) ([address]    Lung mass       Plan:    Myeloproliferative disorder  plt of 1.2 million today  Continue hem/onc follow up    RUE swelling  For us of right upper arm today      Alix Worthington DO  6:52 AM  2/27/2020

## 2020-02-27 NOTE — PROGRESS NOTES
[address] [place] [address] [facility]  [facility]  Progress Note    CC: feeling much better today    Face to face encounter   SUBJECTIVE:  She was found to have a clot in the right arm. She now has a red slightly tender raised area in the right arm. Otherwise, feels much better. Review of systems: As above, otherwise unremarkable. Medications:  Scheduled Meds:   hydroxyurea  2,000 mg Oral Daily    sodium chloride flush  10 mL Intravenous 2 times per day    vitamin D  2,000 Units Oral Daily    vitamin D  50,000 Units Oral Once per day on Mon Thu    warfarin  5 mg Oral Daily    lactobacillus  1 capsule Oral Daily    predniSONE  10 mg Oral Daily    sertraline  25 mg Oral Nightly     Continuous Infusions:    PRN Meds:HYDROcodone 5 mg - acetaminophen, sodium chloride flush, acetaminophen, ondansetron  OBJECTIVE:  Patient Vitals for the past 24 hrs:   BP Temp Temp src Pulse Resp SpO2 Weight   02/27/20 0824 110/73 97.8 °F (36.6 °C) -- 120 18 94 % --   02/27/20 0203 -- -- -- -- -- -- 182 lb 4.8 oz (82.7 kg)   02/26/20 2327 (!) 140/88 98.3 °F (36.8 °C) Oral 91 16 -- --   02/26/20 1932 -- -- -- -- -- 94 % --   02/26/20 1604 (!) 141/87 98.7 °F (37.1 °C) Oral 97 16 94 % --     Constitutional: The patient is awake, alert, and oriented. Sitting in chair. Skin: Warm and dry. No rashes were noted. Head: Eyes show round, and reactive pupils. No jaundice. Mouth: Moist mucous membranes, no ulcerations, no thrush. Neck: Supple. Chest: No use of accessory muscles to breathe. Symmetrical expansion. No crackles. Cardiovascular: Heart sounds rhythmic and regular. Abdomen: Soft and benign to palpation. Positive bowel sounds. Extremities: Palpable cord to right antecubital.  Proximal to this is a oblong, raised, erythematous patch.   PIV    Laboratory and Tests Review:  Lab Results   Component Value Date    WBC 25.0 (H) 02/27/2020    WBC 22.8 (H) 02/26/2020    WBC 19.0 (H) 02/25/2020    HGB 11.2 (L) 02/27/2020

## 2020-02-27 NOTE — PROGRESS NOTES
Dr. Oneal Fleming notified of [address] results. Requested hem/onc be notified regarding blood thinner/theraputic INR. No discharge today. [facility] called and message left for Dr. Chrystal Singh. Awaiting callback/orders.

## 2020-02-27 NOTE — PROGRESS NOTES
Patient is on a cart in the hallway going down the stairs. I guess the plans are to send her home today which is fine but I have asked her to go take 1000 twice a day of Hydrea at this point. She has taken this amount in the past.  I told her that she will need to see us on Monday to recheck her counts.

## 2020-02-28 NOTE — PROGRESS NOTES
Subjective: The patient is awake and alert sitting up in the chair. Having 8/10 pain in her right upper arm, has some relief from [address].  No problems overnight. Denies chest pain, angina, dyspnea or abdominal discomfort. No nausea or vomiting. No fevers or chills. Tolerating diet. Objective:    BP (!) 142/79   Pulse 83   Temp 98.3 °F (36.8 °C) (Oral)   Resp 18   Ht 5' 5\" (1.651 m)   Wt 182 lb 8 oz (82.8 kg)   SpO2 96%   BMI 30.37 kg/m²     General: NAD, pleasant  HEENT: No thrush or mucositis, EOMI, PERRLA  Heart:  RRR, no murmurs, gallops, or rubs.   Lungs:  CTA bilaterally, no wheeze, rales or rhonchi  Abd: bowel sounds present, nontender, nondistended, no masses  Extrem: +1 BLE edema, no clubbing or cyanosis  Lymphatics: No palpable adenopathy in cervical and supraclavicular regions  Skin: Erythema, warmth to RUE, intact, no petechia or purpura    CBC with Differential:    Lab Results   Component Value Date    WBC 25.2 02/28/2020    RBC 3.46 02/28/2020    HGB 11.1 02/28/2020    HCT 37.1 02/28/2020    PLT 1,241 02/28/2020    .2 02/28/2020    MCH 32.1 02/28/2020    MCHC 29.9 02/28/2020    RDW 22.5 02/28/2020    NRBC 0.0 02/28/2020    SEGSPCT 82 12/29/2013    BLASTSPCT 4.0 02/27/2020    METASPCT 0.0 02/25/2020    LYMPHOPCT 14.9 02/28/2020    MONOPCT 6.1 02/28/2020    MYELOPCT 2.0 12/08/2019    BASOPCT 0.9 02/28/2020    MONOSABS 1.51 02/28/2020    LYMPHSABS 4.03 02/28/2020    EOSABS 0.00 02/28/2020    BASOSABS 0.23 02/28/2020     CMP:    Lab Results   Component Value Date     02/26/2020    K 3.8 02/26/2020    K 4.5 12/09/2019    CL 96 02/26/2020    CO2 25 02/26/2020    BUN 16 02/26/2020    CREATININE 1.4 02/26/2020    GFRAA 45 02/26/2020    LABGLOM 38 02/26/2020    GLUCOSE 86 02/26/2020    PROT 8.1 02/26/2020    LABALBU 3.5 02/26/2020    CALCIUM 9.3 02/26/2020    BILITOT <0.2 02/26/2020    ALKPHOS 101 02/26/2020    AST 17 02/26/2020    ALT <5 02/26/2020        Current Facility-Administered Medications:     methylPREDNISolone sodium (SOLU-MEDROL) injection 40 mg, 40 mg, Intravenous, Daily, Darrall Search, DO    aspirin chewable tablet 81 mg, 81 mg, Oral, Daily, Nick Barker,     hydroxyurea (HYDREA) chemo capsule 2,000 mg, 2,000 mg, Oral, Daily, Arian Solano MD, 2,000 mg at 02/27/20 5649    HYDROcodone-acetaminophen (NORCO) 5-325 MG per tablet 1 tablet, 1 tablet, Oral, Q6H PRN, Galina Mujica MD, 1 tablet at 02/28/20 0449    sodium chloride flush 0.9 % injection 10 mL, 10 mL, Intravenous, 2 times per day, Darrall Search, DO, 10 mL at 02/27/20 2103    sodium chloride flush 0.9 % injection 10 mL, 10 mL, Intravenous, PRN, Darrall Search, DO    acetaminophen (TYLENOL) tablet 650 mg, 650 mg, Oral, Q4H PRN, Darrall Search, DO    ondansetron (ZOFRAN-ODT) disintegrating tablet 4 mg, 4 mg, Oral, Q8H PRN, Darrall Search, DO    vitamin D tablet 2,000 Units, 2,000 Units, Oral, Daily, Mikey Figueroa MD, 2,000 Units at 02/27/20 2380    vitamin D (ERGOCALCIFEROL) capsule 50,000 Units, 50,000 Units, Oral, Once per day on Mon Thu, Mikey Figueroa MD, 50,000 Units at 02/27/20 5606    warfarin (COUMADIN) tablet 5 mg, 5 mg, Oral, Daily, Mikey Figueroa MD, Stopped at 02/26/20 1800    lactobacillus (CULTURELLE) capsule 1 capsule, 1 capsule, Oral, Daily, Mikey Figueroa MD, 1 capsule at 02/27/20 0940    sertraline (ZOLOFT) tablet 25 mg, 25 mg, Oral, Nightly, Darrall Search, DO, 25 mg at 02/27/20 2103    Assessment:    Active Problems:    Lung mass  Resolved Problems:    * No resolved hospital problems. *    77 year old female known to [facility] with myeloproliferative disorder with leukocytosis/thrombocytosis/polycythemia diagnosed in 2007. She has been on hydrea for a long time and it was recently held when her platelet count dropped in the 70,000 range this month. She was placed on coumadin for prior PE (transitioned from lovenox).  Now admitted with febrile illness, Ct chest with

## 2020-02-28 NOTE — PROGRESS NOTES
cyanosis, clubbing, edema surrounding right elbow  Lymph: No cervical LAD. No supraclavicular LAD. M/S: No cyanosis. No joint deformity. No clubbing. Neuro: Awake. Follows commands. Positive pupils/gag/corneals. Normal pain response. Results:  CBC:   Recent Labs     02/26/20 0542 02/27/20  0550 02/28/20  0455   WBC 22.8* 25.0* 25.2*   HGB 11.3* 11.2* 11.1*   HCT 37.7 37.1 37.1   .5* 107.8* 107.2*   PLT 1,177* 1,258* 1,241*     BMP:   Recent Labs     02/26/20 0542      K 3.8   CL 96*   CO2 25   BUN 16   CREATININE 1.4*     LIVER PROFILE:   Recent Labs     02/26/20  0542   AST 17   ALT <5   BILITOT <0.2   ALKPHOS 101     PT/INR:   Recent Labs     02/26/20 0542 02/27/20  0550 02/28/20  0455   PROTIME 74.1* 51.9* 33.1*   INR 6.0* 4.2 2.7     APTT: No results for input(s): APTT in the last 72 hours. Pathology:  1. N/A      Microbiology:  1. None    Recent ABG:   No results for input(s): PH, PO2, PCO2, HCO3, BE, O2SAT, METHB, O2HB, COHB, O2CON, HHB, THB in the last 72 hours. Recent Films:  US DUP UPPER EXTREMITY RIGHT VENOUS   Final Result   Superficial thrombophlebitis involving the cephalic and median   antecubital veins. CTA PULMONARY W CONTRAST   Final Result      Resolution of bilateral pulmonary embolism. Persistent consolidation within the bilateral costophrenic angles   which is becoming suspicious for underlying malignancy. Further workup   is recommended. Consider PET scan for further characterization. Increasing mediastinal lymphadenopathy. Sliding hiatal hernia. Mild diffuse interstitial lung disease. IMPRESSION:      No evidence for pulmonary embolism or aortic dissection. No evidence for acute process on CTA of the chest.          Assessment:  1. Right-sided and left-sided pleural changes:  These appear to be stable from September of last year and may reflect residua from pulmonary emboli. Ayana Raji are no new pulmonary emboli. 2. Sweet syndrome  3. Myelodysplastic syndrome     Plan:  1. CT scan of the chest in 6 months  2. Okay to discharge from our standpoint. Care reviewed with the staff and the patient's family as available. Please note that voice recognition technology was used in the preparation of this note and make therefore it may contain inadvertent transcription errors. Gio Diehl M.D., F.C.C.P.     [facility] [address] [facility], [address] [place] [address], [address] [place] [address], [facility]

## 2020-02-28 NOTE — PROGRESS NOTES
Subjective: The patient is awake and alert. No problems overnight. Denies chest pain, angina, and dyspnea. Denies abdominal pain. Tolerating diet. No nausea or vomiting. Objective:    BP (!) 142/79   Pulse 83   Temp 98.3 °F (36.8 °C) (Oral)   Resp 18   Ht 5' 5\" (1.651 m)   Wt 182 lb 8 oz (82.8 kg)   SpO2 96%   BMI 30.37 kg/m²     Heart:  RRR, no murmurs, gallops, or rubs.   Lungs:  CTA bilaterally, no wheeze, rales or rhonchi  Abd: bowel sounds present, nontender, nondistended, no masses  Extrem:  No clubbing, cyanosis, or edema    CBC with Differential:    Lab Results   Component Value Date    WBC 25.2 02/28/2020    RBC 3.46 02/28/2020    HGB 11.1 02/28/2020    HCT 37.1 02/28/2020    PLT 1,241 02/28/2020    .2 02/28/2020    MCH 32.1 02/28/2020    MCHC 29.9 02/28/2020    RDW 22.5 02/28/2020    NRBC 0.0 02/28/2020    SEGSPCT 82 12/29/2013    BLASTSPCT 4.0 02/27/2020    METASPCT 0.0 02/25/2020    LYMPHOPCT 14.9 02/28/2020    MONOPCT 6.1 02/28/2020    MYELOPCT 2.0 12/08/2019    BASOPCT 0.9 02/28/2020    MONOSABS 1.51 02/28/2020    LYMPHSABS 4.03 02/28/2020    EOSABS 0.00 02/28/2020    BASOSABS 0.23 02/28/2020     CMP:    Lab Results   Component Value Date     02/26/2020    K 3.8 02/26/2020    K 4.5 12/09/2019    CL 96 02/26/2020    CO2 25 02/26/2020    BUN 16 02/26/2020    CREATININE 1.4 02/26/2020    GFRAA 45 02/26/2020    LABGLOM 38 02/26/2020    GLUCOSE 86 02/26/2020    PROT 8.1 02/26/2020    LABALBU 3.5 02/26/2020    CALCIUM 9.3 02/26/2020    BILITOT <0.2 02/26/2020    ALKPHOS 101 02/26/2020    AST 17 02/26/2020    ALT <5 02/26/2020     PT/INR:    Lab Results   Component Value Date    PROTIME 33.1 02/28/2020    INR 2.7 02/28/2020     @cktotal:3,ckmb:3,ckmbindex:3,troponini:3@  U/A:    Lab Results   Component Value Date    NITRU Negative 02/21/2020    COLORU Yellow 02/21/2020    PHUR 5.0 02/21/2020    LABCAST FEW 08/27/2019    WBCUA 0-1 02/21/2020    RBCUA 0-1 02/21/2020    BACTERIA

## 2020-02-29 NOTE — PROGRESS NOTES
02/26/2020            US DUP UPPER EXTREMITY RIGHT VENOUS   Final Result   Superficial thrombophlebitis involving the cephalic and median   antecubital veins. CTA PULMONARY W CONTRAST   Final Result      Resolution of bilateral pulmonary embolism. Persistent consolidation within the bilateral costophrenic angles   which is becoming suspicious for underlying malignancy. Further workup   is recommended. Consider PET scan for further characterization. Increasing mediastinal lymphadenopathy. Sliding hiatal hernia. Mild diffuse interstitial lung disease. IMPRESSION:      No evidence for pulmonary embolism or aortic dissection.       No evidence for acute process on CTA of the chest.            Current Facility-Administered Medications:     sterile water injection, , , ,     methylPREDNISolone sodium (SOLU-MEDROL) injection 40 mg, 40 mg, Intravenous, Daily, Cowgill Bitter, DO, 40 mg at 02/29/20 0909    aspirin chewable tablet 81 mg, 81 mg, Oral, Daily, Cowgill Bitter, DO, 81 mg at 02/29/20 6973    hydroxyurea (HYDREA) chemo capsule 2,000 mg, 2,000 mg, Oral, Daily, Radha Cole MD, 2,000 mg at 02/29/20 0914    HYDROcodone-acetaminophen (NORCO) 5-325 MG per tablet 1 tablet, 1 tablet, Oral, Q6H PRN, Funmi Neff MD, 1 tablet at 02/28/20 2330    sodium chloride flush 0.9 % injection 10 mL, 10 mL, Intravenous, 2 times per day, Cowgill Bitter, DO, 10 mL at 02/29/20 0912    sodium chloride flush 0.9 % injection 10 mL, 10 mL, Intravenous, PRN, Andrea Bitter, DO, 10 mL at 02/28/20 0206    acetaminophen (TYLENOL) tablet 650 mg, 650 mg, Oral, Q4H PRN, Andrea Bitter, DO    ondansetron (ZOFRAN-ODT) disintegrating tablet 4 mg, 4 mg, Oral, Q8H PRN, Andrea Bitter, DO    vitamin D tablet 2,000 Units, 2,000 Units, Oral, Daily, Phuong Gomes MD, 2,000 Units at 02/29/20 0911    vitamin D (ERGOCALCIFEROL) capsule 50,000 Units, 50,000 Units, Oral, Once per day on Mon Thu, Phuong Gomes MD,

## 2020-02-29 NOTE — PROGRESS NOTES
CLINICAL PHARMACY NOTE: MEDS TO [address] Select Patient?: No  Total # of Prescriptions Filled: 2   The following medications were delivered to the patient:  · Methylprednisolone 4 tbpk  · Aspirin 81mg  Total # of Interventions Completed: 5  Time Spent (min): 45    Additional Documentation:

## 2020-02-29 NOTE — PROGRESS NOTES
Call placed to Dr. Francy Zelaya regarding discharge-she returned call-ok to discharge -patient has a follow up appointment on Monday with Dr. Francy Zelaya to get blood work and she will adjust medications accordingly. Call placed to Dr. Ashley Smith on Oncology recommendation-ok to discharge will submit.   Electronically signed by Isabelle Haynes RN on 2/29/2020 at 11:25 AM

## 2020-02-29 NOTE — PLAN OF CARE
Problem: Falls - Risk of:  Goal: Will remain free from falls  Description  Will remain free from falls  Outcome: Met This Shift  Goal: Absence of physical injury  Description  Absence of physical injury  Outcome: Met This Shift     Problem: Intellectual/Education/Knowledge Deficit  Goal: Teaching initiated upon admission  Outcome: Ongoing

## 2020-02-29 NOTE — PROGRESS NOTES
[facility] Quality Flow/Interdisciplinary Rounds Progress Note        Quality Flow Rounds held on February 29, 2020    Disciplines Attending:  Bedside Nurse and Nursing Unit Leadership    Corbin Dowling was admitted on 2/21/2020 12:35 PM    Anticipated Discharge Date:  Expected Discharge Date: 02/22/20(estimate)    Disposition:    Brent Score:  Brent Scale Score: 21    Readmission Risk              Risk of Unplanned Readmission:        17           Discussed patient goal for the day, patient clinical progression, and barriers to discharge. The following Goal(s) of the Day/Commitment(s) have been identified:  Discharge.       Bib Pastor  February 29, 2020

## 2020-02-29 NOTE — PROGRESS NOTES
(H) 02/29/2020    WBC 25.2 (H) 02/28/2020    WBC 25.0 (H) 02/27/2020    HGB 11.4 (L) 02/29/2020    HCT 39.0 02/29/2020    .9 (H) 02/29/2020    PLT 1,296 (H) 02/29/2020     Lab Results   Component Value Date    NEUTROABS 19.40 (H) 02/28/2020    NEUTROABS 21.00 (H) 02/27/2020    NEUTROABS 16.87 (H) 02/26/2020     Lab Results   Component Value Date    CRP 21.8 (H) 02/22/2020    CRP 0.4 02/12/2016     Lab Results   Component Value Date    SEDRATE 93 (H) 02/22/2020    SEDRATE 5 02/12/2016     Lab Results   Component Value Date    ALT <5 02/26/2020    AST 17 02/26/2020    ALKPHOS 101 02/26/2020    BILITOT <0.2 02/26/2020     Lab Results   Component Value Date     02/26/2020    K 3.8 02/26/2020    K 4.5 12/09/2019    CL 96 02/26/2020    CO2 25 02/26/2020    BUN 16 02/26/2020    CREATININE 1.4 02/26/2020    GFRAA 45 02/26/2020    LABGLOM 38 02/26/2020    GLUCOSE 86 02/26/2020    PROT 8.1 02/26/2020    LABALBU 3.5 02/26/2020    CALCIUM 9.3 02/26/2020    BILITOT <0.2 02/26/2020    ALKPHOS 101 02/26/2020    AST 17 02/26/2020    ALT <5 02/26/2020     Radiology:  Reviewed     Microbiology:   Respiratory panel: Negative    ASSESSMENT:  · Fevers. Etiology not established. They have resolved. This could have been a viral infection, not detected on a respiratory panel. Cultures were negative. · Leukocytosis with thrombocytosis associated to myeloproliferative disorder. On Hydrea. WBC coming down. · History of Sweet syndrome, on low-dose prednisone. Retrospectively, she may have had an exacerbation.   The patch of erythema over the right biceps does not look like thrombophlebitis but rather neutrophilic dermatosis  · History of polycythemia vera  · Diarrhea associated to the above, resolving    PLAN:  · Continue to observe off antibiotics  · Ok to discharge from ID point of view    Su Jaime  11:10 AM  2/29/2020

## 2020-03-03 NOTE — PROGRESS NOTES
Outpatient Medications:     azelastine (ASTELIN) 0.1 % nasal spray, 2 sprays by Nasal route 2 times daily Use in each nostril as directed, Disp: 1 Bottle, Rfl: 1    mupirocin (BACTROBAN) 2 % ointment, Apply nasally 3 times daily, Disp: 22 g, Rfl: 0    aspirin 81 MG chewable tablet, Take 1 tablet by mouth daily, Disp: 30 tablet, Rfl: 3    hydroxyurea (HYDREA) 500 MG chemo capsule, Take 4 capsules by mouth daily, Disp: 120 capsule, Rfl: 0    predniSONE (DELTASONE) 10 MG tablet, Take 1 tablet by mouth daily Resume 10 mg daily once medrol dose pack completed, Disp: 30 tablet, Rfl: 0    warfarin (COUMADIN) 5 MG tablet, Take 5 mg by mouth, Disp: , Rfl:     Cholecalciferol (VITAMIN D) 50 MCG (2000 UT) CAPS capsule, Take 2,000 Units by mouth daily Every day except Monday and Thursday when pt takes 95174 units. , Disp: , Rfl:     BIOTIN PO, Take by mouth, Disp: , Rfl:     vitamin D (ERGOCALCIFEROL) 98572 units CAPS capsule, Take 50,000 Units by mouth Twice a Week Monday and Thursday, Disp: , Rfl:     Probiotic Product (PROBIOTIC DAILY PO), Take by mouth daily Indications: 30 billion, Disp: , Rfl:     sertraline (ZOLOFT) 25 MG tablet, Take 25 mg by mouth every evening , Disp: , Rfl:   Dilaudid [hydromorphone hcl]  Social History     Tobacco Use    Smoking status: Former Smoker     Packs/day: 0.50     Years: 10.00     Pack years: 5.00     Types: Cigarettes     Last attempt to quit: 2004     Years since quittin.1    Smokeless tobacco: Never Used   Substance Use Topics    Alcohol use: Yes     Comment: Socially    Drug use: No     Family History   Problem Relation Age of Onset    Cancer Father     Diabetes Mother     Thyroid Disease Sister        Review of Systems   Constitutional: Negative for chills and fever. HENT: Positive for congestion, rhinorrhea, sinus pressure and sinus pain. Negative for ear discharge, hearing loss, tinnitus, trouble swallowing and voice change.     Respiratory: Negative for cough and shortness of breath. Cardiovascular: Negative for chest pain and palpitations. Gastrointestinal: Negative for vomiting. Skin: Negative for rash. Allergic/Immunologic: Negative for environmental allergies. Neurological: Negative for dizziness and headaches. Hematological: Does not bruise/bleed easily. All other systems reviewed and are negative. BP (!) 140/88   Ht 5' 5\" (1.651 m)   Wt 178 lb (80.7 kg)   BMI 29.62 kg/m²   Physical Exam  Vitals signs and nursing note reviewed. Constitutional:       Appearance: She is well-developed. HENT:      Head: Normocephalic and atraumatic. Right Ear: Tympanic membrane, ear canal and external ear normal.      Left Ear: Tympanic membrane, ear canal and external ear normal.      Nose: No nasal deformity, septal deviation, signs of injury or laceration. Right Turbinates: Enlarged and swollen. Left Turbinates: Enlarged and swollen. Right Sinus: No maxillary sinus tenderness or frontal sinus tenderness. Left Sinus: No maxillary sinus tenderness or frontal sinus tenderness. Mouth/Throat:      Lips: No lesions. Mouth: No oral lesions. Dentition: Normal dentition. Does not have dentures. No dental tenderness or gingival swelling. Tongue: No lesions. Tongue does not deviate from midline. Palate: No mass and lesions. Pharynx: No pharyngeal swelling, oropharyngeal exudate, posterior oropharyngeal erythema or uvula swelling. Eyes:      Conjunctiva/sclera: Conjunctivae normal.      Pupils: Pupils are equal, round, and reactive to light. Neck:      Musculoskeletal: Normal range of motion and neck supple. Cardiovascular:      Rate and Rhythm: Normal rate. Pulmonary:      Effort: Pulmonary effort is normal. No respiratory distress. Breath sounds: Normal breath sounds. Abdominal:      General: There is no distension. Tenderness: There is no abdominal tenderness. There is no guarding. Musculoskeletal: Normal range of motion. Skin:     General: Skin is warm and dry. Neurological:      Mental Status: She is alert and oriented to person, place, and time. Psychiatric:         Behavior: Behavior normal.         Thought Content: Thought content normal.         Judgment: Judgment normal.         IMPRESSION/PLAN:  Patient seen and examined, history of nasal steroids which is failing she has right-sided nasal septal ulcer with significant crusting which she admits to manipulating and continue to cause irritation recommendations are for the patient to start Bactroban ointment applied with a Q-tip twice daily for 2 weeks and then she may switch to nasal Astelin proper use is reviewed today for chronic rhinitis and congestion. Follow-up in 6 months, or sooner with any noted increase in symptoms or issues. Dr. Junito Yu.  Otolaryngology Facial Plastic Surgery  :  [facility] Otolaryngology/Facial Plastic Surgery Res[facility]  Associate Clinical Professor:  ADRIAN Walters  [facility]

## 2020-04-24 NOTE — TELEPHONE ENCOUNTER
Patient is a new referral. Spoke to  patient to  discuss referral information and schedule an appointment.  Appointment given as May 14, 2020

## 2020-04-29 NOTE — DISCHARGE SUMMARY
Patient ID:  Tianna Escalante  [id number]  67 y.o.  1955    Admit date: 2/21/2020    Discharge date and time: 2/29/2020  1:10 PM     Admission Diagnoses: Lung mass [R91.8]  Lung mass [R91.8]  Lung mass [R91.8]  Lung mass [R91.8]    Discharge Diagnoses: Active Problems:    Lung mass  Resolved Problems:    * No resolved hospital problems. *        Consults: pulmonary/intensive care and ID    Procedures: none    Hospital Course: Patient presented to hospital short of breath. She has history of pulmonary embolism and myeloproliferative disorder. She presented with back pain. CTA showed questionable lung mass. She is on chronic steroids and anticoagulation. Her platelet count consistently above 750. She was started on iv solumedrol for superficial thrombophlebitis. Fevers were felt to be cause of tachycardia on presentation. No infectious etiology ever found for fevers. She improved on steroids and discharged in good condition.     Discharge Exam:  See progress note from today    Disposition: home    Patient Instructions:   Discharge Medication List as of 2/29/2020 11:33 AM      START taking these medications    Details   aspirin 81 MG chewable tablet Take 1 tablet by mouth daily, Disp-30 tablet, R-3Normal      methylPREDNISolone (MEDROL DOSEPACK) 4 MG tablet Take by mouth as directed per dose pack directions, Disp-21 tablet, R-0Normal         CONTINUE these medications which have CHANGED    Details   hydroxyurea (HYDREA) 500 MG chemo capsule Take 4 capsules by mouth daily, Disp-120 capsule, R-0Normal      predniSONE (DELTASONE) 10 MG tablet Take 1 tablet by mouth daily Resume 10 mg daily once medrol dose pack completed, Disp-30 tablet, R-0Normal         CONTINUE these medications which have NOT CHANGED    Details   warfarin (COUMADIN) 5 MG tablet Take 5 mg by mouthHistorical Med      Cholecalciferol (VITAMIN D) 50 MCG (2000 UT) CAPS capsule Take 2,000 Units by mouth daily Every day except Monday and Thursday when pt takes 38159 units. Historical Med      BIOTIN PO Take by mouthHistorical Med      vitamin D (ERGOCALCIFEROL) 03651 units CAPS capsule Take 50,000 Units by mouth Twice a Week Monday and ThursdayHistorical Med      Probiotic Product (PROBIOTIC DAILY PO) Take by mouth daily Indications: 30 billion      sertraline (ZOLOFT) 25 MG tablet Take 25 mg by mouth every evening          STOP taking these medications       cephALEXin (KEFLEX) 500 MG capsule Comments:   Reason for Stopping:         enoxaparin (LOVENOX) 100 MG/ML injection Comments:   Reason for Stopping:             Activity: activity as tolerated  Diet: regular diet    Follow-up with Mj in several week.     Note that over 30 minutes was spent in preparing discharge papers, discussing discharge with patient, medication review, etc.    Signed:  Chester Guerrero  4/29/2020  7:11 AM

## 2020-08-05 NOTE — PROGRESS NOTES
OR     Current Medications:   Prior to Admission medications    Medication Sig Start Date End Date Taking? Authorizing Provider   apixaban (ELIQUIS) 5 MG TABS tablet Take 5 mg by mouth 2 times daily   Yes Historical Provider, MD   azelastine (ASTELIN) 0.1 % nasal spray 2 sprays by Nasal route 2 times daily Use in each nostril as directed 3/9/20  Yes Luli Cedillo,    aspirin 81 MG chewable tablet Take 1 tablet by mouth daily 3/1/20  Yes Gilberto Carson MD   hydroxyurea (HYDREA) 500 MG chemo capsule Take 4 capsules by mouth daily 3/1/20  Yes Gilberto Carson MD   predniSONE (DELTASONE) 10 MG tablet Take 1 tablet by mouth daily Resume 10 mg daily once medrol dose pack completed 2/7/20  Yes Gilberto Carson MD   warfarin (COUMADIN) 5 MG tablet Take 5 mg by mouth   Yes Historical Provider, MD   Cholecalciferol (VITAMIN D) 50 MCG (2000 UT) CAPS capsule Take 2,000 Units by mouth daily Every day except Monday and Thursday when pt takes 88052 units.    Yes Historical Provider, MD   BIOTIN PO Take by mouth   Yes Historical Provider, MD   vitamin D (ERGOCALCIFEROL) 04091 units CAPS capsule Take 50,000 Units by mouth Twice a Week Monday and Thursday 10/12/18  Yes Historical Provider, MD   Probiotic Product (PROBIOTIC DAILY PO) Take by mouth daily Indications: 30 billion   Yes Historical Provider, MD   sertraline (ZOLOFT) 25 MG tablet Take 25 mg by mouth every evening    Yes Historical Provider, MD     Allergies:  Dilaudid [hydromorphone hcl]    Social History     Socioeconomic History    Marital status:      Spouse name: Not on file    Number of children: Not on file    Years of education: Not on file    Highest education level: Not on file   Occupational History    Not on file   Social Needs    Financial resource strain: Not on file    Food insecurity     Worry: Not on file     Inability: Not on file    Transportation needs     Medical: Not on file     Non-medical: Not on file   Tobacco Use    Smoking status: Former Smoker     Packs/day: 0.50     Years: 10.00     Pack years: 5.00     Types: Cigarettes     Last attempt to quit: 2004     Years since quittin.5    Smokeless tobacco: Never Used   Substance and Sexual Activity    Alcohol use: Yes     Comment: Socially    Drug use: No    Sexual activity: Yes     Partners: Male   Lifestyle    Physical activity     Days per week: Not on file     Minutes per session: Not on file    Stress: Not on file   Relationships    Social connections     Talks on phone: Not on file     Gets together: Not on file     Attends Christianity service: Not on file     Active member of club or organization: Not on file     Attends meetings of clubs or organizations: Not on file     Relationship status: Not on file    Intimate partner violence     Fear of current or ex partner: Not on file     Emotionally abused: Not on file     Physically abused: Not on file     Forced sexual activity: Not on file   Other Topics Concern    Not on file   Social History Narrative    Not on file        Family History   Problem Relation Age of Onset    Cancer Father     Diabetes Mother     Thyroid Disease Sister        REVIEW OF SYSTEMS:    Constitutional: Negative for activity change, appetite change, chills, diaphoresis, fatigue, fever and unexpected weight change. HEENT: Negative for congestion, ear discharge, ear pain, hearing loss, nosebleeds, rhinorrhea, sinus pain, sore throat, tinnitus, trouble swallowing and voice change. Eyes: Negative for photophobia, pain, discharge, redness, itching and visual disturbance. Respiratory: Negative for apnea, cough, chest tightness, shortness of breath and wheezing. Cardiovascular: Negative for chest pain, palpitations and leg swelling. Gastrointestinal: Negative for abdominal distention, abdominal pain, blood in stool, constipation, diarrhea, nausea and vomiting.    Endocrine: Negative for cold intolerance, heat intolerance, polydipsia, polyphagia and polyuria. Genitourinary: Negative for decreased urine volume, difficulty urinating, dysuria, frequency, hematuria and urgency. Musculoskeletal: Negative for arthralgias, back pain, gait problem, joint swelling and neck pain. Skin: Negative for color change, pallor, rash and wound. Allergic/Immunologic: Negative for environmental allergies, food allergies and immunocompromised state. Neurological: Negative for dizziness, syncope, facial asymmetry, speech difficulty, weakness, light-headedness, numbness and headaches. Hematological: Negative for adenopathy. Does not bruise/bleed easily. Psychiatric/Behavioral: Negative for agitation, confusion, sleep disturbance and suicidal ideas. The patient is not nervous/anxious. Vascular: See history of present illness  Skin: History of lower extremity discoloration          PHYSICAL EXAM:  Vitals:    08/05/20 0802   BP: 124/80   Resp: 16     General Appearance: alert and oriented to person, place and time, well developed and well- nourished, in no acute distress  Skin: warm and dry, no rash or erythema, the right anterior shin has an area that is a little bit warm to touch. This does not look cellulitic. There are some varicose veins underneath. There is no palpable cords.   Head: normocephalic and atraumatic  Eyes: extraocular eye movements intact, conjunctivae normal  ENT: external ear and ear canal normal bilaterally, nose without deformity  Pulmonary/Chest: clear to auscultation bilaterally- no wheezes, rales or rhonchi, normal air movement, no respiratory distress  Cardiovascular: normal rate, regular rhythm, normal S1 and S2, no murmurs, no carotid bruits  Abdomen: soft, non-tender, non-distended, normal bowel sounds, no masses or organomegaly  Musculoskeletal: normal range of motion, no joint swelling, deformity or tenderness  Neurologic: no cranial nerve deficit, gait, coordination and speech normal  Extremities: Bilateral palpable brachial and radial pulses. Bilateral palpable dorsalis pedis and posterior tibials. Motor and sensation are intact in the upper and lower extremities. There is no deficit. Problem List Items Addressed This Visit     Polycythemia vera ([facility] [address] - Primary (Chronic)    DVT, recurrent, lower extremity, acute (HCC)    Venous insufficiency of leg    Bilateral pulmonary embolism (HCC)          #1 history of DVT, history of pulmonary embolus. She is been on multiple anticoagulants. She has a history of polycythemia. She is developed blood clots while on anticoagulation in the past at least twice that we know of. From our standpoint she feels better relieving the inferior vena cava filter in place which I think is a reasonable option secondary to these issues. She does know that certainly there could be complications with regards to the filter in particular filter migration perforation erosion fracture and/or thrombosis. She will remain on anticoagulation long-term. We will get a baseline KUB to evaluate the filters position. Right now no plans for an inferior vena cava filter retrieval.  Also compression stockings would be of benefit long-term for this patient. No follow-ups on file.

## 2020-08-21 NOTE — TELEPHONE ENCOUNTER
Left message regarding x-ray results (filter in good location). Asked to call with questions or any problems. Keep follow up appointment.

## 2020-11-29 PROBLEM — Z20.822 SUSPECTED COVID-19 VIRUS INFECTION: Status: ACTIVE | Noted: 2020-01-01

## 2020-11-29 NOTE — ED NOTES
Bed: 11  Expected date: 11/29/20  Expected time:   Means of arrival:   Comments:  Garland Glez RN  11/29/20 1023

## 2020-11-29 NOTE — ED NOTES
Daughter called and wants update on information. States to call her instead of . Amandeep Ferrara.  Ana Peels, [place]  11/29/20 0021

## 2020-11-29 NOTE — ED PROVIDER NOTES
12/10/2019). Social History:  reports that she quit smoking about 16 years ago. Her smoking use included cigarettes. She has a 5.00 pack-year smoking history. She has never used smokeless tobacco. She reports current alcohol use. She reports that she does not use drugs. Family History: family history includes Cancer in her father; Diabetes in her mother; Thyroid Disease in her sister. The patients home medications have been reviewed. Allergies: Dilaudid [hydromorphone hcl]        ---------------------------------------------------PHYSICAL EXAM--------------------------------------    Constitutional/General: Alert and oriented x3, somewhat ill-appearing. Mildly dehydrated. Head: Normocephalic and atraumatic  Eyes: PERRL, EOMI, conjunctive normal, sclera non icteric  Mouth: Oropharynx clear, handling secretions, no trismus, no asymmetry of the posterior oropharynx or uvular edema  Neck: Supple, full ROM, non tender to palpation in the midline, no stridor, no crepitus, no meningeal signs  Respiratory: Lungs clear to auscultation bilaterally, no wheezes, rales, or rhonchi. Not in respiratory distress. No tachypnea or accessory muscle use. Cardiovascular:  Regular rate. Regular rhythm. No murmurs, gallops, or rubs. 2+ distal pulses  Chest: No chest wall tenderness  GI:  Abdomen Soft, Non tender, Non distended. +BS. No organomegaly, no palpable masses,  No rebound, guarding, or rigidity. No Ryan sign. No McBurney's point tenderness. No CVA tenderness bilaterally   musculoskeletal: Moves all extremities x 4. Warm and well perfused, no clubbing, cyanosis, or edema. Capillary refill <3 seconds  Integument: skin warm and dry. No rashes.    Lymphatic: no lymphadenopathy noted  Neurologic: GCS 15, no focal deficits, symmetric strength 5/5 in the upper and lower extremities bilaterally  Psychiatric: Normal Affect    -------------------------------------------------- RESULTS -------------------------------------------------  I have personally reviewed all laboratory and imaging results for this patient. Results are listed below.      LABS:  Results for orders placed or performed during the hospital encounter of 11/29/20   CBC auto differential   Result Value Ref Range    WBC 25.3 (H) 4.5 - 11.5 E9/L    RBC 3.44 (L) 3.50 - 5.50 E12/L    Hemoglobin 10.4 (L) 11.5 - 15.5 g/dL    Hematocrit 35.8 34.0 - 48.0 %    .1 (H) 80.0 - 99.9 fL    MCH 30.2 26.0 - 35.0 pg    MCHC 29.1 (L) 32.0 - 34.5 %    RDW 21.8 (H) 11.5 - 15.0 fL    Platelets 908 452 - 867 E9/L    MPV 13.2 (H) 7.0 - 12.0 fL    Neutrophils % 81.0 (H) 43.0 - 80.0 %    Lymphocytes % 4.0 (L) 20.0 - 42.0 %    Monocytes % 13.0 (H) 2.0 - 12.0 %    Eosinophils % 0.0 0.0 - 6.0 %    Basophils % 0.0 0.0 - 2.0 %    Neutrophils Absolute 20.49 (H) 1.80 - 7.30 E9/L    Lymphocytes Absolute 1.52 1.50 - 4.00 E9/L    Monocytes Absolute 3.29 (H) 0.10 - 0.95 E9/L    Eosinophils Absolute 0.00 (L) 0.05 - 0.50 E9/L    Basophils Absolute 0.00 0.00 - 0.20 E9/L    Atypical Lymphocytes Relative 2.0 0.0 - 4.0 %    nRBC 1.0 /100 WBC    Anisocytosis 2+     Poikilocytes 1+     Schistocytes 1+     Ovalocytes 1+    Comprehensive Metabolic Panel w/ Reflex to MG   Result Value Ref Range    Sodium 127 (L) 132 - 146 mmol/L    Potassium reflex Magnesium 3.7 3.5 - 5.0 mmol/L    Chloride 94 (L) 98 - 107 mmol/L    CO2 12 (L) 22 - 29 mmol/L    Anion Gap 21 (H) 7 - 16 mmol/L    Glucose 107 (H) 74 - 99 mg/dL    BUN 73 (H) 8 - 23 mg/dL    CREATININE 3.4 (H) 0.5 - 1.0 mg/dL    GFR Non-African American 13 >=60 mL/min/1.73    GFR African American 16     Calcium 7.9 (L) 8.6 - 10.2 mg/dL    Total Protein 7.9 6.4 - 8.3 g/dL    Alb 3.0 (L) 3.5 - 5.2 g/dL    Total Bilirubin 0.3 0.0 - 1.2 mg/dL    Alkaline Phosphatase 116 (H) 35 - 104 U/L    ALT 13 0 - 32 U/L    AST 69 (H) 0 - 31 U/L   Lactate, Sepsis   Result Value Ref Range    Lactic Acid, Sepsis 1.4 0.5 - 1.9 mmol/L   URINALYSIS REVIEWED ---------------------------   The nursing notes within the ED encounter and vital signs as below have been reviewed by myself. /78   Pulse 85   Temp 98.4 °F (36.9 °C)   Resp 16   Wt 180 lb (81.6 kg)   SpO2 95%   BMI 29.95 kg/m²   Oxygen Saturation Interpretation: Normal    The patients available past medical records and past encounters were reviewed. ------------------------------ ED COURSE/MEDICAL DECISION MAKING----------------------  Medications   0.9 % sodium chloride IV bolus 2,448 mL (has no administration in time range)   cefTRIAXone (ROCEPHIN) 1 g in sterile water 10 mL IV syringe (has no administration in time range)   metronidazole (FLAGYL) 500 mg in NaCl 100 mL IVPB premix (has no administration in time range)   0.9 % sodium chloride bolus (1,000 mLs Intravenous New Bag 11/29/20 0954)   ondansetron (ZOFRAN) injection 4 mg (4 mg Intravenous Given 11/29/20 0954)   ketorolac (TORADOL) injection 15 mg (15 mg Intravenous Given 11/29/20 0954)         ED COURSE:       Medical Decision Making:    Patient is a pleasant 8year-old female states she has been feeling well for the past week. She states she has had low-grade fevers generalized malaise not eating and drinking as well. She had upper respiratory symptoms with cough but no chest pain or shortness of breath. She also reports some urinary complaints. Patient is mildly hypotensive on arrival at 95/58. She is given IV fluids. We will do a septic work-up here will check urine chest x-ray respiratory panel to check for Covid. No known sick contacts. Differential diagnosis includes viral pneumonia, URI, Covid infection, dehydration, UTI, pyelonephritis, dehydration, electrolyte abnormality, ACS, MI, dysrhythmia, sepsis      Patient had an EKG in emergency room that showed normal sinus rhythm 87 beats minute no signs of any ST changes. Patient had a urinalysis negative for acute infection.   She had a CBC with a white count elevated 25.3, but this is chronic for her she does have. Myeloproliferative disease and polycythemia vera. She did have a metabolic panel that was significantly abnormal.  Her CO2 was 12 anion gap was 21 and creatinine elevated with DORYS to 3.4 I do feel this is due to significant dehydration with her being sick and poor p.o. intake over the course of the past week normal lactic acid 1.4 blood cultures were sent and troponin was negativeChest x-ray showed scattered bilateral airspace disease I did do a CT of the chest does show glass opacities in both lungs consistent with likely Covid pneumonia. Respiratory swab was sent CT abdomen pelvis showed mild diverticulitis no signs of abscess and free air. She was started on IV Rocephin and Flagyl. Again, blood cultures were sent. After 2 L of IV fluid blood pressure improved 105/78 resting pulse is 85. Vital signs appear stable at this time she will be admitted. I spoke to PCP Dr. Tyler Pedro. Patient is not hypoxic here but will be followed for Covid pneumonia which is likely at this time. Repeat chemistry will be followed for correction of electrolytes with further hydration. This patient has remained hemodynamically stable during their ED course. Re-Evaluations:             Re-evaluation. Patients symptoms are improving    Re-examination  11/29/20   9:44 AM EST          Vital Signs:   Vitals:    11/29/20 1430 11/29/20 1445 11/29/20 1508 11/29/20 1543   BP: (!) 94/57  (!) 99/58 105/78   Pulse: 91  85 85   Resp:   16    Temp:   98.4 °F (36.9 °C)    SpO2:   95%    Weight:  180 lb (81.6 kg)           CRITICAL CARE:  42 MINUTES. Please note that the withdrawal or failure to initiate urgent interventions for this patient would likely result in a life threatening deterioration or permanent disability. Accordingly this patient received the above mentioned time, excluding separately billable procedures.            Consultations:             I spoke to Dr. Trish Gordillo and he will admit the patient to his service. Counseling: The emergency provider has spoken with the patient and discussed todays results, in addition to providing specific details for the plan of care and counseling regarding the diagnosis and prognosis. Questions are answered at this time and they are agreeable with the plan.       --------------------------------- IMPRESSION AND DISPOSITION ---------------------------------    IMPRESSION  1. Suspected COVID-19 virus infection    2. Dehydration    3. DORYS (acute kidney injury) ([facility] [address]    4. Diverticulitis of colon    5. Hypotension due to hypovolemia        DISPOSITION  Disposition: Admit to telemetry  Patient condition is stable    NOTE: This report was transcribed using voice recognition software.  Every effort was made to ensure accuracy; however, inadvertent computerized transcription errors may be present        Abisai Raygoza MD  11/29/20 9712

## 2020-11-30 PROBLEM — U07.1 COVID-19: Status: ACTIVE | Noted: 2020-01-01

## 2020-11-30 NOTE — PROGRESS NOTES
Pharmacy Documentation     Medication: Remdesivir     Date: 11/30/20  Physician: Dr. Krishna Lucia consulted to initiate remdesivir. Patient does not currently meet [facility] Remdesivir Criteria for Use to initiate remdesivir based on renal function. Pharmacy will sign off. Please re-consult if the clinical condition changes and patient meets criteria for initiation based on [facility] Remdesivir Criteria for Use.      Thank you for this consult,  Zaheer Licea

## 2020-11-30 NOTE — PROGRESS NOTES
Doctor called due to another consult put in for infectious disease. Peng Bain He was told that they will not take consult because patient is not on remdesivir. Pharmacy stated two different times patient does not qualify due to kidney function.

## 2020-11-30 NOTE — PROGRESS NOTES
Called ID  [phone] to give consult from Dr. Gomez Downs for Covid with Renal Failure.   Jane Abrams  11/30/2020  5:08 PM

## 2020-11-30 NOTE — PROGRESS NOTES
Pharmacy Documentation     Medication: Remdesivir     Date: 11-  Physician: Jessika Fuentes consulted to initiate remdesivir. Patient does not currently meet [facility] Remdesivir Criteria for Use to initiate remdesivir based on eGFR > 30. Pharmacy will sign off. Please re-consult if the clinical condition changes and patient meets criteria for initiation based on [facility] Remdesivir Criteria for Use.      Thank you for this consult,  Victor Manuel Dickens, [facility]  11/30/2020  6:30 AM

## 2020-11-30 NOTE — PROGRESS NOTES
Infectious Disease would like Hospitalist to see patent before taking consult for Covid with Renal Failure.   Ramone Christianson  11/30/2020  10:29 AM

## 2020-11-30 NOTE — PROGRESS NOTES
Received call from [address]. Pt is to remain NPO after breakfast for US this afternoon.

## 2020-11-30 NOTE — CONSULTS
[address] [place] [address] [facility]  Consultation Note     Admit Date: 11/29/2020  8:48 AM    Reason for Consult:   Covid with renal failure    Attending Physician:  Garret Tenorio DO    HISTORY OF PRESENT ILLNESS:             The history is obtained from extensive review of available past medical records. The patient is a 77 y.o. female who is known to the ID service. The patient has a history of myelodysplastic syndrome. She lives at home with her  and does not go out except to go shopping once a week. She is careful about wearing masks at all times. She recently had a skin cancer removed from the right nostril. 1 week prior to the admission she came down with a cough, temperatures of 103 °F, headache, loss of taste and smell,, dyspnea on exertion, myalgias and diarrhea. She came to the hospital on 11/29/2020. CT showed bilateral groundglass peripheral patchy infiltrates. She was placed on oral Dexamethasone. Remdesivir was ordered because of her creatinine clearance it was denied. ID was asked to weigh in. Past Medical History:        Diagnosis Date    Anxiety     history of    Breast disorder     History of DVT (deep vein thrombosis) 12/30/2013    Polycythemia vera(238.4)     follows with Dr. Ford Prim monthly, treated w Hydrea & intermittent phlebotomies    PONV (postoperative nausea and vomiting)     Pulmonary embolism ([address]     history of    Recurrent epistaxis     Sweet syndrome     Venous insufficiency of leg 12/30/2013    R leg per patient     February 2020. Admitted to the hospital complaining of chest pain, dyspnea on exertion. CT of the chest was negative for PE but showed pleural-based nodules. She reported loose stools and was prescribed Cephalexin. Respiratory panel was negative but the white count was 27.1. Seen by ID for an elevated WBC count. She had fevers. The WBC was associated to myeloproliferative disorder. The diarrhea improved.   She was oriented. She seems to be dyspneic, especially on exertion. Skin: Warm and dry. No rashes were noted. HEENT: Eyes show round, and reactive pupils. No jaundice. Moist mucous membranes, no ulcerations, no thrush. Scab right nostril noted. Neck: Supple to movements. No lymphadenopathy. Chest: No use of accessory muscles to breathe. Symmetrical expansion. Auscultation reveals scattered crackles on bases posteriorly. Cardiovascular: S1 and S2 are rhythmic and regular. No murmurs appreciated. Abdomen: Positive bowel sounds to auscultation. Benign to palpation. No masses felt. No hepatosplenomegaly. Extremities: No clubbing, no cyanosis, no edema.   Lines: peripheral      CBC+dif:  Recent Labs     11/29/20  0937 11/30/20  1420   WBC 25.3* 21.1*   HGB 10.4* 9.7*   HCT 35.8 32.4*   .1* 104.2*    399   NEUTROABS 20.49* 19.41*     Lab Results   Component Value Date    CRP 21.8 (H) 02/22/2020    CRP 0.4 02/12/2016      No results found for: CRP  Lab Results   Component Value Date    SEDRATE 93 (H) 02/22/2020    SEDRATE 5 02/12/2016     Lab Results   Component Value Date    ALT 16 11/30/2020    AST 82 (H) 11/30/2020    ALKPHOS 91 11/30/2020    BILITOT 0.3 11/30/2020     Lab Results   Component Value Date     11/30/2020    K 4.1 11/30/2020    K 3.9 11/29/2020     11/30/2020    CO2 12 11/30/2020    BUN 78 11/30/2020    CREATININE 3.5 11/30/2020    GFRAA 16 11/30/2020    LABGLOM 13 11/30/2020    GLUCOSE 131 11/30/2020    PROT 7.3 11/30/2020    LABALBU 2.7 11/30/2020    CALCIUM 8.0 11/30/2020    BILITOT 0.3 11/30/2020    ALKPHOS 91 11/30/2020    AST 82 11/30/2020    ALT 16 11/30/2020       Lab Results   Component Value Date    PROTIME 26.7 11/29/2020    INR 2.3 11/29/2020       No results found for: TSH    Lab Results   Component Value Date    NITRITE neg 10/03/2019    COLORU Yellow 11/29/2020    PHUR 5.0 11/29/2020    LABCAST FEW 08/27/2019    WBCUA 2-5 11/29/2020    RBCUA 5-10 11/29/2020 BACTERIA MODERATE 11/29/2020    CLARITYU Clear 11/29/2020    SPECGRAV >=1.030 11/29/2020    LEUKOCYTESUR Negative 11/29/2020    UROBILINOGEN 0.2 11/29/2020    BILIRUBINUR MODERATE 11/29/2020    BILIRUBINUR neg 10/03/2019    BLOODU LARGE 11/29/2020    GLUCOSEU Negative 11/29/2020    AMORPHOUS MODERATE 11/29/2020       No results found for: HCO3, BE, O2SAT, PH, THGB, PCO2, PO2, TCO2  Radiology:      Microbiology:  Pending  No results for input(s): BC in the last 72 hours. No results for input(s): ORG in the last 72 hours. No results for input(s): [address] [place] in the last 72 hours. No results for input(s): STREPNEUMAGU in the last 72 hours. No results for input(s): LP1UAG in the last 72 hours. No results for input(s): ASO in the last 72 hours. No results for input(s): CULTRESP in the last 72 hours. No results for input(s): PROCAL in the last 72 hours. Assessment:  · SARS-CoV-2 infection  · Viral pneumonia associated to the above  · Probable bacterial pneumonia right lower lobe  · Myeloproliferative disorder  · Lymphopenia associated to viral infection  · Immunocompromised host  · Chronic kidney disease    Plan:    · Start Levofloxacin  · Start Remdesivir. I explained to the patient that we have used this drug in over a dozen patients with chronic kidney disease and we have had great results. Although there is a relative contraindication, I believe the benefit of using the drug outweighs the risks. We will be monitoring LFTs and creatinine clearance closely  · Check cultures, baseline ESR, CRP, IL-6, procalcitonin  · Nares screen for MRSA  · Urinary antigens  · Monitor labs  · Will follow with you    Thank you for having us see this patient in consultation. I will be discussing this case with the treating physicians.     Tova Elizaldebina  5:32 PM  11/30/2020

## 2020-11-30 NOTE — PROGRESS NOTES
Nurse note reviewed  Pharmacy requested ID consult, in fact called office to have placed-after ID called first time  Appreciate nursing help with this

## 2020-11-30 NOTE — PROGRESS NOTES
Subjective:    Per bedside nurse, she is now on 3L NC. Patient is confused in her room wetting the bed which is not like the patient at all per daughter.     Objective:    BP (!) 140/61   Pulse 150   Temp 101 °F (38.3 °C) (Oral)   Resp 24   Ht 5' 5\" (1.651 m)   Wt 180 lb (81.6 kg)   SpO2 94%   BMI 29.95 kg/m²     Limited exam due to COVID-19       CBC with Differential:    Lab Results   Component Value Date    WBC 25.3 11/29/2020    RBC 3.44 11/29/2020    HGB 10.4 11/29/2020    HCT 35.8 11/29/2020     11/29/2020    .1 11/29/2020    MCH 30.2 11/29/2020    MCHC 29.1 11/29/2020    RDW 21.8 11/29/2020    NRBC 1.0 11/29/2020    SEGSPCT 82 12/29/2013    BLASTSPCT 4.0 02/27/2020    METASPCT 0.9 02/29/2020    LYMPHOPCT 4.0 11/29/2020    MONOPCT 13.0 11/29/2020    MYELOPCT 0.9 02/29/2020    BASOPCT 0.0 11/29/2020    MONOSABS 3.29 11/29/2020    LYMPHSABS 1.52 11/29/2020    EOSABS 0.00 11/29/2020    BASOSABS 0.00 11/29/2020     CMP:    Lab Results   Component Value Date     11/29/2020    K 3.9 11/29/2020    CL 99 11/29/2020    CO2 12 11/29/2020    BUN 75 11/29/2020    CREATININE 3.7 11/29/2020    GFRAA 15 11/29/2020    LABGLOM 12 11/29/2020    GLUCOSE 101 11/29/2020    PROT 7.2 11/29/2020    LABALBU 2.6 11/29/2020    CALCIUM 7.3 11/29/2020    BILITOT 0.3 11/29/2020    ALKPHOS 88 11/29/2020    AST 76 11/29/2020    ALT 13 11/29/2020         Current Facility-Administered Medications:     apixaban (ELIQUIS) tablet 5 mg, 5 mg, Oral, BID, Jerrlyn Poag Mj, DO, 5 mg at 11/30/20 0930    vitamin D (CHOLECALCIFEROL) tablet 2,000 Units, 2,000 Units, Oral, Daily, Pauline Singleton DO, 2,000 Units at 11/30/20 0930    hydroxyurea (HYDREA) chemo capsule 1,500 mg, 1,500 mg, Oral, Daily, Pauline Singleton DO, 1,500 mg at 11/30/20 0900    sertraline (ZOLOFT) tablet 25 mg, 25 mg, Oral, QPM, Nick Barker DO    guaiFENesin tablet 400 mg, 400 mg, Oral, TID, Shashank Barker DO, 400 mg at 11/30/20 0930    lactobacillus (CULTURELLE) capsule 1 capsule, 1 capsule, Oral, Daily, Kathrynn [place], DO, 1 capsule at 11/30/20 0930    dexamethasone (DECADRON) tablet 6 mg, 6 mg, Oral, Daily, Kathrynn [place], DO, 6 mg at 11/30/20 0930    ondansetron (ZOFRAN) injection 4 mg, 4 mg, Intravenous, Q6H PRN, Kathrynn [place], DO, 4 mg at 11/30/20 0305    HYDROcodone-acetaminophen (NORCO) 5-325 MG per tablet 1 tablet, 1 tablet, Oral, Q6H PRN, Kathrynn [place], DO, 1 tablet at 11/30/20 0305    acetaminophen (TYLENOL) tablet 650 mg, 650 mg, Oral, Q6H PRN, Kathrynn [place], DO, 650 mg at 11/30/20 0930    sodium bicarbonate 150 mEq in dextrose 5 % 1,000 mL infusion, , Intravenous, Continuous, Nahum Mccallum MD, Last Rate: 125 mL/hr at 11/30/20 1154    metoprolol tartrate (LOPRESSOR) tablet 25 mg, 25 mg, Oral, BID, Blanquita Feeler Mj, DO, 25 mg at 11/30/20 1016    Assessment:    Active Problems:    COVID-19  Resolved Problems:    * No resolved hospital problems. *    75 year old female known to [facility] with myeloproliferative disorder with leukocytosis/thrombocytosis/polycythemia diagnosed in 2007. She is currently on Hydrea 500 mg 2 tabs daily and Eliquis on lifelong anticoagulation because of a history of recurrent DVT and PE in the setting of polycythemia vera. She recently is being further evaluated for monoclonal gammopathy-needing bone marrow biopsy in the future. She presented with body aches, fatigue and fever with cough. Found to be COVID-19 +. He has been sick for the past week having fevers up to 101, generalized body aches, nasal congestion as well as a dry cough. She also felt like she has had some dysuria. Labs on admission show WBC 25.3, RBC 3.44, RADHA globin 10.4, hematocrit 35.8, .1, platelets 356, sodium 127, BUN 73, creatinine 3.4. Urinalysis appears to have a UTI.  CT the chest showed multifocal bilateral groundglass and semisolid pulmonary infiltrates most consistent with COVID-19 pneumonia, CT the abdomen/pelvis showed mild mild

## 2020-11-30 NOTE — H&P
CHIEF COMPLAINT:  weakness      HISTORY OF PRESENT ILLNESS:      The patient is a 77 y.o. female patient presents with weakness. She has not felt well since last week. Believes it is days since was able to eat and drink  Profound nausea. Some shortness of breath. No chest pain. Intermittent fever. Tested postive for covid 19 in ER.   Feels little better this am    Past Medical History:    Past Medical History:   Diagnosis Date    Anxiety     history of    Breast disorder     History of DVT (deep vein thrombosis) 12/30/2013    Polycythemia vera(238.4)     follows with Dr. Jana Rothman monthly, treated w [facility] [place] & intermittent phlebotomies    PONV (postoperative nausea and vomiting)     Pulmonary embolism ([address]     history of    Recurrent epistaxis     Sweet syndrome     Venous insufficiency of leg 12/30/2013    R leg per patient       Past Surgical History:    Past Surgical History:   Procedure Laterality Date    BREAST LUMPECTOMY      X3    COLON SURGERY      KNEE ARTHROSCOPY      Right per patient    OTHER SURGICAL HISTORY  1/28/16    control of nose bleed    OTHER SURGICAL HISTORY Right 08/25/2016    right sphenopalatine artery ligation    SHOULDER SURGERY      R rotator cuff per patient    VENA CAVA FILTER PLACEMENT N/A 12/10/2019    FEMORAL VENA CAVA FILTER INSERTION performed by Titi Rod MD at [facility] OR       Medications Prior to Admission:    Medications Prior to Admission: guaiFENesin (MUCINEX) 600 MG extended release tablet, Take 600 mg by mouth 2 times daily  apixaban (ELIQUIS) 5 MG TABS tablet, Take 5 mg by mouth 2 times daily  hydroxyurea (HYDREA) 500 MG chemo capsule, Take 4 capsules by mouth daily (Patient taking differently: Take 1,500 mg by mouth daily )  predniSONE (DELTASONE) 10 MG tablet, Take 1 tablet by mouth daily Resume 10 mg daily once medrol dose pack completed  Cholecalciferol (VITAMIN D) 50 MCG (2000 UT) CAPS capsule, Take 2,000 Units by mouth daily   BIOTIN PO, Take by LYMPHOPCT 4.0 11/29/2020    MONOPCT 13.0 11/29/2020    MYELOPCT 0.9 02/29/2020    BASOPCT 0.0 11/29/2020    MONOSABS 3.29 11/29/2020    LYMPHSABS 1.52 11/29/2020    EOSABS 0.00 11/29/2020    BASOSABS 0.00 11/29/2020     CMP:    Lab Results   Component Value Date     11/29/2020    K 3.9 11/29/2020    CL 99 11/29/2020    CO2 12 11/29/2020    BUN 75 11/29/2020    CREATININE 3.7 11/29/2020    GFRAA 15 11/29/2020    LABGLOM 12 11/29/2020    GLUCOSE 101 11/29/2020    PROT 7.2 11/29/2020    LABALBU 2.6 11/29/2020    CALCIUM 7.3 11/29/2020    BILITOT 0.3 11/29/2020    ALKPHOS 88 11/29/2020    AST 76 11/29/2020    ALT 13 11/29/2020     PT/INR:    Lab Results   Component Value Date    PROTIME 26.7 11/29/2020    INR 2.3 11/29/2020     @cktotal:3,ckmb:3,ckmbindex:3,troponini:3@  U/A:    Lab Results   Component Value Date    NITRU Negative 11/29/2020    COLORU Yellow 11/29/2020    PHUR 5.0 11/29/2020    LABCAST FEW 08/27/2019    WBCUA 2-5 11/29/2020    RBCUA 5-10 11/29/2020    BACTERIA MODERATE 11/29/2020    CLARITYU Clear 11/29/2020    SPECGRAV >=1.030 11/29/2020    UROBILINOGEN 0.2 11/29/2020    BILIRUBINUR MODERATE 11/29/2020    BILIRUBINUR neg 10/03/2019    BLOODU LARGE 11/29/2020    GLUCOSEU Negative 11/29/2020    KETUA TRACE 11/29/2020    AMORPHOUS MODERATE 11/29/2020          ASSESSMENT:      Covid 19    PLAN:    Covid 19-  Decadron and remidisivir adjusted to renal dose  Will continue eliquis at 5 despite renal failure, believe benfits outweigh risk as she clotted on therpuetic anticoagulation in past  Us of kidneys and legs    DORYS  IVF for now  Check Fe na and urine sodium appears dry  Tachycardia will add IVF bolus    Paige Maria DO  6:29 AM  11/30/2020

## 2020-12-01 NOTE — CARE COORDINATION
Social Work / Discharge Planning :COVID POSITIVE: Patient plan HOME at discharge. SW continues to speak to patient daughter Lestine Cushing daily at length. ID and Nephro is now on case and patient has started Remdesivir. Patient continues on 02 at 2 liters at 92%. SW to follow.  Electronically signed by NIKO Morrow on 12/1/20 at 10:51 AM EST

## 2020-12-01 NOTE — PROGRESS NOTES
[address] [place] [address] [facility]  Progress Note    SUBJECTIVE:  Chief Complaint   Patient presents with    Fever    Flank Pain     pt states she thinks she has a UTI    Fatigue     The patient is feeling somewhat better today. Tolerating antiviral and antibiotics. No nausea or vomiting. Review of systems:  As stated above in the chief complaint, otherwise negative. Medications:  Scheduled Meds:   vitamin D  2,000 Units Oral Daily    sertraline  25 mg Oral QPM    guaiFENesin  400 mg Oral TID    lactobacillus  1 capsule Oral Daily    metoprolol tartrate  25 mg Oral BID    dexamethasone  6 mg Oral 2 times per day    levoFLOXacin  750 mg Oral Every Other Day    remdesivir IVPB  100 mg Intravenous Q24H    hydroxyurea  1,000 mg Oral Daily     Continuous Infusions:   heparin (PORCINE) Infusion 18 Units/kg/hr (20 1036)    sodium bicarbonate infusion 125 mL/hr at 20 1003     PRN Meds:heparin (porcine), heparin (porcine), HYDROcodone 5 mg - acetaminophen, ondansetron, acetaminophen, sodium chloride    OBJECTIVE:  /70   Pulse 93   Temp 98.1 °F (36.7 °C) (Axillary)   Resp 18   Ht 5' 5\" (1.651 m)   Wt 180 lb (81.6 kg)   SpO2 92%   BMI 29.95 kg/m²   Temp  Av.4 °F (36.9 °C)  Min: 98.1 °F (36.7 °C)  Max: 98.9 °F (37.2 °C)  Constitutional: The patient is awake, alert, and oriented. No distress. 2 L nasal cannula. Skin: Warm and dry. No rashes were noted. HEENT: Round and reactive pupils. Moist mucous membranes. No ulcerations or thrush. Neck: Supple to movements. Chest: No use of accessory muscles to breathe. Symmetrical expansion. Crackles on right base posteriorly. Cardiovascular: S1 and S2 are rhythmic and regular. No murmurs appreciated. Abdomen: Positive bowel sounds to auscultation. Benign to palpation. No masses felt. Extremities: No clubbing, no cyanosis, no edema.   Lines: peripheral    Laboratory and Tests Review:  Lab Results

## 2020-12-01 NOTE — PROGRESS NOTES
Subjective:    Patient more comfortable today, still needing 02 support. Now on hepatin gtt and bicarb gtt. She is eating and drinking.      Objective:    /70   Pulse 93   Temp 98.1 °F (36.7 °C) (Axillary)   Resp 18   Ht 5' 5\" (1.651 m)   Wt 180 lb (81.6 kg)   SpO2 92%   BMI 29.95 kg/m²     Limited exam due to COVID-19       CBC with Differential:    Lab Results   Component Value Date    WBC 13.6 12/01/2020    RBC 2.89 12/01/2020    HGB 8.8 12/01/2020    HCT 29.3 12/01/2020     12/01/2020    .4 12/01/2020    MCH 30.4 12/01/2020    MCHC 30.0 12/01/2020    RDW 21.0 12/01/2020    NRBC 1.0 11/29/2020    SEGSPCT 82 12/29/2013    BLASTSPCT 4.0 02/27/2020    METASPCT 0.9 02/29/2020    LYMPHOPCT 1.0 12/01/2020    MONOPCT 3.0 12/01/2020    MYELOPCT 0.9 02/29/2020    BASOPCT 0.0 12/01/2020    MONOSABS 0.41 12/01/2020    LYMPHSABS 0.41 12/01/2020    EOSABS 0.00 12/01/2020    BASOSABS 0.00 12/01/2020     CMP:    Lab Results   Component Value Date     12/01/2020     12/01/2020    K 3.8 12/01/2020    K 4.1 12/01/2020     12/01/2020     12/01/2020    CO2 18 12/01/2020    CO2 15 12/01/2020    BUN 75 12/01/2020    BUN 74 12/01/2020    CREATININE 2.9 12/01/2020    CREATININE 2.8 12/01/2020    GFRAA 20 12/01/2020    GFRAA 20 12/01/2020    LABGLOM 16 12/01/2020    LABGLOM 17 12/01/2020    GLUCOSE 147 12/01/2020    GLUCOSE 140 12/01/2020    PROT 6.8 12/01/2020    LABALBU 2.5 12/01/2020    CALCIUM 8.0 12/01/2020    CALCIUM 7.9 12/01/2020    BILITOT <0.2 12/01/2020    ALKPHOS 97 12/01/2020    AST 75 12/01/2020    ALT 16 12/01/2020         Current Facility-Administered Medications:     heparin (porcine) injection 6,530 Units, 80 Units/kg, Intravenous, PRN, Nettie Méndez DO    heparin (porcine) injection 3,260 Units, 40 Units/kg, Intravenous, PRN, Nettie Méndez DO    heparin 25,000 units in dextrose 5% 250 mL infusion, 18 Units/kg/hr, Intravenous, Continuous, Nettie Méndez DO, Last with myeloproliferative disorder with leukocytosis/thrombocytosis/polycythemia diagnosed in 2007. 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 She recently is being further evaluated for monoclonal gammopathy-needing bone marrow biopsy in the future. She presented with body aches, fatigue and fever with cough. Found to be COVID-19 +. He has been sick for the past week having fevers up to 101, generalized body aches, nasal congestion as well as a dry cough. She also felt like she has had some dysuria. Labs on admission show WBC 25.3, RBC 3.44, RADHA globin 10.4, hematocrit 35.8, .1, platelets 700, sodium 127, BUN 73, creatinine 3.4. UTI +. CT the chest showed multifocal bilateral groundglass and semisolid pulmonary infiltrates most consistent with COVID-19 pneumonia, CT the abdomen/pelvis showed mild mild diverticulitis noted in the distal descending colon sigmoid colon junction, there is no abscesses or free air, splenomegaly, 8 mm nonobstructing calculus within the right renal pelvis. CXR shows scattered bilateral airspace opacities may present represent infectious process. Plan:  Monitor CBC daily  Continue hydrea  Hold hydroxyurea if there is worsening anemia- hemoglobin less than 9 g/dL.   Continue IVF  D/C eliquis and started heparin gtt, watching renal function closely   Full supportive care    Electronically signed by ALFREDA Velazquez NP on 12/1/2020 at 1:17 PM

## 2020-12-01 NOTE — PROGRESS NOTES
[facility] MD Marilou No, MD Edgar Luke, MD Vj Perez, MD Francoise Greco, CNP   Jimena Harrison, MICK  Progress Note    12/1/2020    SUBJECTIVE:   12/1: Resting comfortably  No new complaint. Still O2 dependent, though comfortable on room air. Bicarb drip running. Heparin dripstarted. (-) c/o's  (-) sob/lu/cp/palp Appetite ok    PROBLEM LIST:    Active Problems:    COVID-19  Resolved Problems:    * No resolved hospital problems.  *         DIET:    DIET GENERAL;     MEDS (scheduled):    vitamin D  2,000 Units Oral Daily    sertraline  25 mg Oral QPM    guaiFENesin  400 mg Oral TID    lactobacillus  1 capsule Oral Daily    metoprolol tartrate  25 mg Oral BID    dexamethasone  6 mg Oral 2 times per day    levoFLOXacin  750 mg Oral Every Other Day    remdesivir IVPB  100 mg Intravenous Q24H    hydroxyurea  1,000 mg Oral Daily       MEDS (infusions):   heparin (PORCINE) Infusion 18 Units/kg/hr (12/01/20 1036)    sodium bicarbonate infusion 125 mL/hr at 12/01/20 1003       MEDS (prn):  heparin (porcine), heparin (porcine), HYDROcodone 5 mg - acetaminophen, ondansetron, acetaminophen, sodium chloride    PHYSICAL EXAM:     Patient Vitals for the past 24 hrs:   BP Temp Temp src Pulse Resp SpO2   12/01/20 0930 133/70 98.1 °F (36.7 °C) Axillary 93 18 92 %   11/30/20 2214 (!) 127/59 98.3 °F (36.8 °C) Oral 98 19 92 %   11/30/20 2208 -- -- -- -- -- (!) 87 %   11/30/20 1630 -- 98.9 °F (37.2 °C) -- -- -- --   @      Intake/Output Summary (Last 24 hours) at 12/1/2020 1457  Last data filed at 12/1/2020 1412  Gross per 24 hour   Intake 1947.66 ml   Output 950 ml   Net 997.66 ml         Wt Readings from Last 3 Encounters:   11/30/20 180 lb (81.6 kg)   11/03/20 190 lb (86.2 kg)   08/05/20 190 lb (86.2 kg)       PE:  Due to COVID-19 isolation protocol, did not conduct physical examination, though discussed her physical examination with her bedside RN.      DATA:    Recent Labs     11/29/20  0937 11/30/20  1420 12/01/20  0545   WBC 25.3* 21.1* 13.6*   HGB 10.4* 9.7* 8.8*   HCT 35.8 32.4* 29.3*   .1* 104.2* 101.4*    399 437     Recent Labs     11/29/20  1622 11/30/20  1420 12/01/20  0545   * 132 134  134   K 3.9 4.1 3.8  4.1   CL 99 101 101  101   CO2 12* 12* 15*  18*   BUN 75* 78* 74*  75*   CREATININE 3.7* 3.5* 2.8*  2.9*   ALT 13 16 16   AST 76* 82* 75*   BILITOT 0.3 0.3 <0.2   ALKPHOS 88 91 97       No results found for: LABPROT      ASSESSMENT:  1. Acute kidney injury, due to prerenal azotemia secondary to hemodynamic derangement due to sepsis, volume depletion. 2.  Chronic kidney disease stage IIIb. Baseline creatinine 1.3 to 1.4.    3.  Metabolic acidosis due to #1 and #2.    4.  COVID 19 infection with pneumonia. 5.  History of bilateral DVTs. History of PEs. She is chronically on Eliquis. 6.  History of myeloproliferative disorder. She receives hydroxyurea 500 mg daily b.i.d.          RECOMMENDATIONS:   1. Continue isotonic bicarbonate drip  2.   Follow labs, UO    Electronically signed by Deana Sommers MD on 12/1/2020 Detail Level: Simple Detail Level: Zone

## 2020-12-01 NOTE — CONSULTS
[address] [address]                                  CONSULTATION    PATIENT NAME: Ruben Bonilla                  :        1955  MED REC NO:   [id number]                            ROOM:       0002  ACCOUNT NO:   [de-identified]                           ADMIT DATE: 2020  PROVIDER:     Abundio Olsen MD    CONSULT DATE:  2020    REASON FOR CONSULTATION:  Acute kidney injury. HISTORY OF PRESENT ILLNESS:  The patient is a 53-year-old female  admitted with a chief complaint of weakness and shortness of breath. She has been found to have COVID with pneumonia. Nephrology is asked to  see due to acute kidney injury. The patient has a history of myeloproliferative disorder with  polycythemia diagnosed in . She did receive Hydrea 500 mg twice a  day along with Eliquis. She has a history of DVT, history of PE,  history of polycythemia vera among other medical problems. She is currently on isolation for COVID-19 with pneumonia. Nephrology is asked to see for DORYS. Creatinine up to 3.7 today. Her baseline creatinine seems to be around  1.3 to 1.4. She is also significantly acidotic with bicarbonate down to  12. Per report, she has been having fatigue, fever, and cough for the last  week. She had CT chest on admission showing multifocal bilateral ground-glass  opacities. She had also CT abdomen and pelvis showing mild  diverticulitis in the distal descending colon. REVIEW OF SYSTEMS:  Discussed with RN at bedside. Twelve-point done and  negative except for those mentioned above. ALLERGIES:  Include DILAUDID. PAST MEDICAL HISTORY:  Includes anxiety, history of DVT, pulmonary  embolism. PAST SURGICAL HISTORY:  Includes breast lumpectomy. SOCIAL HISTORY:  Quit smoking 16 years ago. FAMILY HISTORY:  Includes cancer in the father and diabetes.     MEDICATIONS:  Include Eliquis, biotin, Zoloft. PHYSICAL EXAMINATION:  VITAL SIGNS:  Blood pressure is 140/61, heart rate 123. GENERAL:  No face-to-face encounter done with the patient. Physical  exam finding was discussed with RN.    BLOOD WORK:  Sodium 129, potassium 3.9, CO2 of 12, BUN 75, creatinine  3.7, albumin 2.6. ASSESSMENT:  1. Acute kidney injury. 2.  Chronic kidney disease. Baseline creatinine 1.3 to 1.4.  3.  Metabolic acidosis due to #1 and #2.  4.  COVID with pneumonia. 5.  History of bilateral DVTs. History of PEs. She is chronically on  Eliquis. 6.  History of myeloproliferative disorder. She receives hydroxyurea  500 mg daily b.i.d. PLAN:  The patient is in acute kidney injury. At this point, this is  felt to be due to volume depletion in context of the pneumonia and  sepsis. COVID-19 can lead to acute kidney injury in multiple mechanism. RECOMMENDATIONS:  At this point, we recommend:  1. Change IV fluid to sodium bicarbonate 150 mEq in D5W at 150 mL an  hour. 2.  Check urine electrolytes. 3.  Avoid nephrotoxins. 4.  Follow up serial BMPs and urine output. 5.  Follow up closely the need for renal replacement therapy.     Thank you very much for your referral.        Stanford Hill MD    D: 11/30/2020 15:24:35       T: 11/30/2020 19:24:04     PARISH/JOSE [email or web address]  Job#: [id number]     Doc#: [id number]    CC:

## 2020-12-01 NOTE — PROGRESS NOTES
Subjective: The patient is awake and alert. Aches severe  Temps to 101  More short of breath  Objective:    BP (!) 127/59   Pulse 98   Temp 98.3 °F (36.8 °C) (Oral)   Resp 19   Ht 5' 5\" (1.651 m)   Wt 180 lb (81.6 kg)   SpO2 92%   BMI 29.95 kg/m²     Heart:  RRR, no murmurs, gallops, or rubs.   Lungs:  CTA bilaterally, no wheeze, rales or rhonchi  Abd: bowel sounds present, nontender, nondistended, no masses  Extrem:  No clubbing, cyanosis, or edema    CBC with Differential:    Lab Results   Component Value Date    WBC 21.1 11/30/2020    RBC 3.11 11/30/2020    HGB 9.7 11/30/2020    HCT 32.4 11/30/2020     11/30/2020    .2 11/30/2020    MCH 31.2 11/30/2020    MCHC 29.9 11/30/2020    RDW 21.5 11/30/2020    NRBC 1.0 11/29/2020    SEGSPCT 82 12/29/2013    BLASTSPCT 4.0 02/27/2020    METASPCT 0.9 02/29/2020    LYMPHOPCT 3.0 11/30/2020    MONOPCT 5.0 11/30/2020    MYELOPCT 0.9 02/29/2020    BASOPCT 0.0 11/30/2020    MONOSABS 1.06 11/30/2020    LYMPHSABS 0.63 11/30/2020    EOSABS 0.00 11/30/2020    BASOSABS 0.00 11/30/2020     CMP:    Lab Results   Component Value Date     11/30/2020    K 4.1 11/30/2020    K 3.9 11/29/2020     11/30/2020    CO2 12 11/30/2020    BUN 78 11/30/2020    CREATININE 3.5 11/30/2020    GFRAA 16 11/30/2020    LABGLOM 13 11/30/2020    GLUCOSE 131 11/30/2020    PROT 7.3 11/30/2020    LABALBU 2.7 11/30/2020    CALCIUM 8.0 11/30/2020    BILITOT 0.3 11/30/2020    ALKPHOS 91 11/30/2020    AST 82 11/30/2020    ALT 16 11/30/2020     PT/INR:    Lab Results   Component Value Date    PROTIME 26.7 11/29/2020    INR 2.3 11/29/2020     @cktotal:3,ckmb:3,ckmbindex:3,troponini:3@  U/A:    Lab Results   Component Value Date    NITRU Negative 11/29/2020    COLORU Yellow 11/29/2020    PHUR 5.0 11/29/2020    LABCAST FEW 08/27/2019    WBCUA 2-5 11/29/2020    RBCUA 5-10 11/29/2020    BACTERIA MODERATE 11/29/2020    CLARITYU Clear 11/29/2020    SPECGRAV >=1.030 11/29/2020 UROBILINOGEN 0.2 11/29/2020    BILIRUBINUR MODERATE 11/29/2020    BILIRUBINUR neg 10/03/2019    BLOODU LARGE 11/29/2020    GLUCOSEU Negative 11/29/2020    KETUA TRACE 11/29/2020    AMORPHOUS MODERATE 11/29/2020        Assessment:    Patient Active Problem List   Diagnosis    DVT, recurrent, lower extremity, acute (HCC)    Polycythemia vera ([address]    Venous insufficiency of leg    History of DVT (deep vein thrombosis)    Epistaxis    Septic thrombophlebitis    Bilateral pulmonary embolism ([address]    PE (pulmonary thromboembolism) ([address]    Lung mass    COVID-19       Plan:    Covid 19 with DORYS-  Await today's labs  VERY MUCH appreciate ID help with remdesivir  More short of breath today  Follow for fluid overload  Encourage prone postioning    Bacterial pneumonia  RLL  procalcitonin very high now on quinolone    Polycythemia-  On hydroxyurea  Monitor counts    HX of multiple thromboembolic events  Will check labs  May need IV heparin        David Miguel DO  6:54 AM  12/1/2020

## 2020-12-02 NOTE — PROGRESS NOTES
[facility] MD Hiro Wu, MD Sheila Gray, MD Sylvia Rhoades, GISELLA Harrison, MICK  Progress Note    12/2/2020    SUBJECTIVE:   12/1: Resting comfortably  No new complaint. Still O2 dependent, though comfortable on room air. Bicarb drip running. Heparin dripstarted. (-) c/o's  (-) sob/lu/cp/palp Appetite ok  12/2: Status quo. Feeling better. Ongoing fatigue, generalized weakness, poor exercise tolerance. Continued poor intake. PROBLEM LIST:    Active Problems:    COVID-19  Resolved Problems:    * No resolved hospital problems.  *         DIET:    DIET GENERAL;     MEDS (scheduled):    dexamethasone  6 mg Oral 2 times per day    vitamin D  2,000 Units Oral Daily    sertraline  25 mg Oral QPM    guaiFENesin  400 mg Oral TID    lactobacillus  1 capsule Oral Daily    metoprolol tartrate  25 mg Oral BID    levoFLOXacin  750 mg Oral Every Other Day    remdesivir IVPB  100 mg Intravenous Q24H    hydroxyurea  1,000 mg Oral Daily       MEDS (infusions):   heparin (PORCINE) Infusion 19 Units/kg/hr (12/02/20 1409)    sodium bicarbonate infusion 125 mL/hr at 12/02/20 1134       MEDS (prn):  guaiFENesin-dextromethorphan, heparin (porcine), heparin (porcine), HYDROcodone 5 mg - acetaminophen, ondansetron, acetaminophen, sodium chloride    PHYSICAL EXAM:     Patient Vitals for the past 24 hrs:   BP Temp Temp src Pulse Resp SpO2 Height Weight   12/02/20 0815 109/68 96.9 °F (36.1 °C) Axillary 73 18 92 % -- --   12/02/20 0000 122/60 97.9 °F (36.6 °C) Oral 82 18 92 % 5' 5\" (1.651 m) 188 lb (85.3 kg)   @      Intake/Output Summary (Last 24 hours) at 12/2/2020 1449  Last data filed at 12/2/2020 0509  Gross per 24 hour   Intake 2824.68 ml   Output 500 ml   Net 2324.68 ml         Wt Readings from Last 3 Encounters:   12/02/20 188 lb (85.3 kg)   11/03/20 190 lb (86.2 kg)   08/05/20 190 lb (86.2 kg)       PE:  Due to COVID-19 isolation protocol, did not conduct physical examination, though discussed her physical examination with her bedside RN. DATA:    Recent Labs     11/30/20  1420 12/01/20  0545 12/02/20  0311   WBC 21.1* 13.6* 8.3   HGB 9.7* 8.8* 8.6*   HCT 32.4* 29.3* 28.1*   .2* 101.4* 99.3    437 470*     Recent Labs     11/30/20  1420 12/01/20  0545 12/02/20  0311    134  134 134   K 4.1 3.8  4.1 4.0    101  101 95*   CO2 12* 15*  18* 23   BUN 78* 74*  75* 63*   CREATININE 3.5* 2.8*  2.9* 2.0*   ALT 16 16 16   AST 82* 75* 77*   BILITOT 0.3 <0.2 <0.2   ALKPHOS 91 97 72       No results found for: LABPROT      ASSESSMENT:  1. Acute kidney injury, due to prerenal azotemia secondary to hemodynamic derangement due to sepsis, volume depletion. 2.  Chronic kidney disease stage IIIb. Baseline creatinine 1.3 to 1.4.    3.  Metabolic acidosis due to #1 and #2.    4.  COVID 19 infection with pneumonia. 5.  History of bilateral DVTs. History of PEs. She is chronically on Eliquis. 6.  History of myeloproliferative disorder. She receives hydroxyurea 500 mg daily b.i.d.     Renal function improving  Nonoliguric  Acidosis improving  On heparin drip       RECOMMENDATIONS:   1. Continue IV fluid at conservative rate, though will change to NS  2.   Follow labs, UO    Electronically signed by Reba Wilson MD on 12/2/2020

## 2020-12-02 NOTE — PROGRESS NOTES
[address] [place] [address] [facility]  Progress Note    SUBJECTIVE:  Chief Complaint   Patient presents with    Fever    Flank Pain     pt states she thinks she has a UTI    Fatigue     The patient continues to improve slowly. Minimal dyspnea. Minimal cough. Not tolerating antibiotics and antiviral.    Review of systems:  As stated above in the chief complaint, otherwise negative. Medications:  Scheduled Meds:   dexamethasone  6 mg Oral 2 times per day    vitamin D  2,000 Units Oral Daily    sertraline  25 mg Oral QPM    guaiFENesin  400 mg Oral TID    lactobacillus  1 capsule Oral Daily    metoprolol tartrate  25 mg Oral BID    levoFLOXacin  750 mg Oral Every Other Day    remdesivir IVPB  100 mg Intravenous Q24H    hydroxyurea  1,000 mg Oral Daily     Continuous Infusions:   sodium chloride      heparin (PORCINE) Infusion 19 Units/kg/hr (20 1409)     PRN Meds:guaiFENesin-dextromethorphan, heparin (porcine), heparin (porcine), HYDROcodone 5 mg - acetaminophen, ondansetron, acetaminophen, sodium chloride    OBJECTIVE:  /68   Pulse 73   Temp 96.9 °F (36.1 °C) (Axillary)   Resp 18   Ht 5' 5\" (1.651 m)   Wt 188 lb (85.3 kg)   SpO2 92%   BMI 31.28 kg/m²   Temp  Av.4 °F (36.3 °C)  Min: 96.9 °F (36.1 °C)  Max: 97.9 °F (36.6 °C)  Constitutional: The patient is awake, alert, and oriented. No distress. 2 L nasal cannula. Skin: Warm and dry. No rashes were noted. Lying on the couch. HEENT: Round and reactive pupils. Moist mucous membranes. No ulcerations or thrush. Neck: Supple to movements. Chest: No respiratory distress. No crackles heard today. Cardiovascular: Heart sounds rhythmic and regular. Abdomen: Positive bowel sounds to auscultation. Benign to palpation. No masses felt. Extremities: No edema.   Lines: peripheral    Laboratory and Tests Review:  Lab Results   Component Value Date    WBC 8.3 2020    WBC 13.6 (H) 2020    WBC 21.1 (H) 11/30/2020    HGB 8.6 (L) 12/02/2020    HCT 28.1 (L) 12/02/2020    MCV 99.3 12/02/2020     (H) 12/02/2020     Lab Results   Component Value Date    NEUTROABS 7.89 (H) 12/02/2020    NEUTROABS 12.78 (H) 12/01/2020    NEUTROABS 19.41 (H) 11/30/2020     No results found for: CRPHS  Lab Results   Component Value Date    ALT 16 12/02/2020    AST 77 (H) 12/02/2020    ALKPHOS 72 12/02/2020    BILITOT <0.2 12/02/2020     Lab Results   Component Value Date     12/02/2020    K 4.0 12/02/2020    CL 95 12/02/2020    CO2 23 12/02/2020    BUN 63 12/02/2020    CREATININE 2.0 12/02/2020    CREATININE 2.9 12/01/2020    CREATININE 2.8 12/01/2020    GFRAA 30 12/02/2020    LABGLOM 25 12/02/2020    GLUCOSE 138 12/02/2020    PROT 7.0 12/02/2020    LABALBU 2.4 12/02/2020    CALCIUM 7.8 12/02/2020    BILITOT <0.2 12/02/2020    ALKPHOS 72 12/02/2020    AST 77 12/02/2020    ALT 16 12/02/2020     Lab Results   Component Value Date    CRP 7.2 (H) 12/02/2020    CRP 15.2 (H) 12/01/2020    CRP 18.7 (H) 11/30/2020     Lab Results   Component Value Date    SEDRATE 89 (H) 11/30/2020    SEDRATE 93 (H) 02/22/2020    SEDRATE 5 02/12/2016     Radiology:      Microbiology:   Blood cultures 11/30/2020: Negative so far  Respiratory panel: SARS-CoV-2 detected     Recent Labs     11/30/20  1900   PROCAL 8.63*       ASSESSMENT:  · SARS-CoV-2 infection with viral pneumonia  · Probable superimposed bacterial pneumonia right lower lobe  · Myeloproliferative disorder  · Lymphopenia  · Immunocompromised host  · CKD    PLAN:  · Continue Remdesivir, day 3  · Continue Levofloxacin, day 3 of 7  · Anticoagulation  · Steroids  · The patient can go home from ID standpoint    Nathaly Truong  2:56 PM  12/2/2020

## 2020-12-02 NOTE — PROGRESS NOTES
Subjective:    Patient more comfortable today, still needing 02 on 2L. Per nursing staff patient is complaining of chronic back pain. Herparin gtt. She is eating and drinking.      Objective:    /68   Pulse 73   Temp 96.9 °F (36.1 °C) (Axillary)   Resp 18   Ht 5' 5\" (1.651 m)   Wt 188 lb (85.3 kg)   SpO2 92%   BMI 31.28 kg/m²     Limited exam due to COVID-19       CBC with Differential:    Lab Results   Component Value Date    WBC 8.3 12/02/2020    RBC 2.83 12/02/2020    HGB 8.6 12/02/2020    HCT 28.1 12/02/2020     12/02/2020    MCV 99.3 12/02/2020    MCH 30.4 12/02/2020    MCHC 30.6 12/02/2020    RDW 21.1 12/02/2020    NRBC 0.0 12/02/2020    SEGSPCT 82 12/29/2013    BLASTSPCT 4.0 02/27/2020    METASPCT 0.9 02/29/2020    LYMPHOPCT 2.6 12/02/2020    MONOPCT 2.6 12/02/2020    MYELOPCT 0.9 02/29/2020    BASOPCT 0.0 12/02/2020    MONOSABS 0.25 12/02/2020    LYMPHSABS 0.25 12/02/2020    EOSABS 0.00 12/02/2020    BASOSABS 0.00 12/02/2020     CMP:    Lab Results   Component Value Date     12/02/2020    K 4.0 12/02/2020    CL 95 12/02/2020    CO2 23 12/02/2020    BUN 63 12/02/2020    CREATININE 2.0 12/02/2020    GFRAA 30 12/02/2020    LABGLOM 25 12/02/2020    GLUCOSE 138 12/02/2020    PROT 7.0 12/02/2020    LABALBU 2.4 12/02/2020    CALCIUM 7.8 12/02/2020    BILITOT <0.2 12/02/2020    ALKPHOS 72 12/02/2020    AST 77 12/02/2020    ALT 16 12/02/2020         Current Facility-Administered Medications:     heparin (porcine) injection 6,530 Units, 80 Units/kg, Intravenous, PRN, [place], DO, 6,530 Units at 12/02/20 0517    heparin (porcine) injection 3,260 Units, 40 Units/kg, Intravenous, PRN, Freeman Em DO    heparin 25,000 units in dextrose 5% 250 mL infusion, 18 Units/kg/hr, Intravenous, Continuous, Freeman Em DO, Last Rate: 18 mL/hr at 12/02/20 0518, 22 Units/kg/hr at 12/02/20 0518    HYDROcodone-acetaminophen (NORCO) 5-325 MG per tablet 1 tablet, 1 tablet, Oral, Q6H PRN, Kayley Gannon Mj, DO, 1 tablet at 12/02/20 0103    dexamethasone (DECADRON) tablet 6 mg, 6 mg, Oral, 2 times per day, Anusha German MD, 6 mg at 12/02/20 1860    vitamin D (CHOLECALCIFEROL) tablet 2,000 Units, 2,000 Units, Oral, Daily, Marina Sherri, DO, 2,000 Units at 12/02/20 4281    sertraline (ZOLOFT) tablet 25 mg, 25 mg, Oral, QPM, Delmus Daft Mj, DO, 25 mg at 12/02/20 0003    guaiFENesin tablet 400 mg, 400 mg, Oral, TID, Marina Sherri, DO, 400 mg at 12/02/20 3752    lactobacillus (CULTURELLE) capsule 1 capsule, 1 capsule, Oral, Daily, Marina Sherri, DO, 1 capsule at 12/02/20 8803    [facility]ron [facility]) injection 4 mg, 4 mg, Intravenous, Q6H PRN, Marina Sherri, DO, 4 mg at 11/30/20 0305    acetaminophen (TYLENOL) tablet 650 mg, 650 mg, Oral, Q6H PRN, Marina Sherri, DO, 650 mg at 11/30/20 0930    sodium bicarbonate 150 mEq in dextrose 5 % 1,000 mL infusion, , Intravenous, Continuous, Suellen Sandhoff, MD, Last Rate: 125 mL/hr at 12/02/20 1134    metoprolol tartrate (LOPRESSOR) tablet 25 mg, 25 mg, Oral, BID, Marina Sherri, DO, 25 mg at 12/02/20 3504    levoFLOXacin (LEVAQUIN) tablet 750 mg, 750 mg, Oral, Every Other Day, Anusha German MD, 750 mg at 11/30/20 2219    [COMPLETED] remdesivir 200 mg in sodium chloride 0.9 % 250 mL IVPB, 200 mg, Intravenous, Once, Stopped at 11/30/20 1930 **FOLLOWED BY** remdesivir 100 mg in sodium chloride 0.9 % 250 mL IVPB, 100 mg, Intravenous, Q24H, Anusha German MD, Stopped at 12/02/20 0034    0.9 % sodium chloride bolus, 30 mL, Intravenous, PRN, Anusha German MD    hydroxyurea [facility]) chemo capsule 1,000 mg, 1,000 mg, Oral, Daily, Saravanan Fung MD, 1,000 mg at 12/02/20 0900    Assessment:    Active Problems:    COVID-19  Resolved Problems:    * No resolved hospital problems. *    75 year old female known to [facility] with myeloproliferative disorder with leukocytosis/thrombocytosis/polycythemia diagnosed in 2007.  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 She recently is being further evaluated for monoclonal gammopathy-needing bone marrow biopsy in the future. She presented with body aches, fatigue and fever with cough. Found to be COVID-19 +. He has been sick for the past week having fevers up to 101, generalized body aches, nasal congestion as well as a dry cough. She also felt like she has had some dysuria. Labs on admission show WBC 25.3, RBC 3.44, RADHA globin 10.4, hematocrit 35.8, .1, platelets 428, sodium 127, BUN 73, creatinine 3.4. UTI +. CT the chest showed multifocal bilateral groundglass and semisolid pulmonary infiltrates most consistent with COVID-19 pneumonia, CT the abdomen/pelvis showed mild mild diverticulitis noted in the distal descending colon sigmoid colon junction, there is no abscesses or free air, splenomegaly, 8 mm nonobstructing calculus within the right renal pelvis. CXR shows scattered bilateral airspace opacities may present represent infectious process. Plan:  Monitor CBC daily  Per Gretel Squires, patient will need to stay on hydrea regardless of hemoglobin count, one day off of hydrea can cause then to spike into the millions   Continue IVF  D/C eliquis and started heparin gtt, watching renal function closely   Full supportive care    Electronically signed by ALFREDA Marlow NP on 12/2/2020 at 12:21 PM     Discussed case with nurse practitioner. Patient has very labile platelets. Will need to maintain good control of platelet count to decrease her risk of thrombotic events. Continue on her current dose of hydroxyurea 1000 mg p.o. daily. CBC daily. Continue treatment as per ID for her Covid and UTI symptoms. Kidney function is slowly improving. When she gets back to baseline she will be able to resume the Eliquis.   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 She has an elevated monoclonal protein. She will need a bone marrow biopsy when she is stabilized and a PET scan to assess the extent of her disease. This will be done as an outpatient.     Susie Brunner MD

## 2020-12-03 NOTE — PROGRESS NOTES
Heparin is currently running at 15.5/hr. Was in therapuetic range last draw, redrew and sending in APTT.

## 2020-12-03 NOTE — HOME CARE
[facility] referral received, awaiting final orders. Spoke with patient and verified demographics. Will follow. Ramandeep Tapia LPN [address].

## 2020-12-03 NOTE — CARE COORDINATION
Social Work / Discharge Planning : SW spoke to patient daughter Elsie Noble. [address] referral made to [facility]. Elsie Noble will discuss with her mother and if she wants [address] then order will be neended. REDDY also made referral Sonido at [id number] [facility] for patient potential 02 need. SW to follow.  Electronically signed by NIKO Russell on 12/3/20 at 12:05 PM EST

## 2020-12-03 NOTE — PROGRESS NOTES
Units, 80 Units/kg, Intravenous, PRN, Rachel Stark DO, [place] Units at 12/02/20 0517    heparin (porcine) injection 3,260 Units, 40 Units/kg, Intravenous, PRN, Rachel Stark DO    HYDROcodone-acetaminophen [facility]) 5-325 MG per tablet 1 tablet, 1 tablet, Oral, Q6H PRN, Rachel Stark DO, 1 tablet at 12/03/20 2517    dexamethasone (DECADRON) tablet 6 mg, 6 mg, Oral, 2 times per day, Jaki Goldman MD, 6 mg at 12/03/20 2663    vitamin D (CHOLECALCIFEROL) tablet 2,000 Units, 2,000 Units, Oral, Daily, Rachel Stark DO, 2,000 Units at 12/03/20 2858    sertraline (ZOLOFT) tablet 25 mg, 25 mg, Oral, QPM, Elease Meth Mj, DO, 25 mg at 12/02/20 2241    guaiFENesin tablet 400 mg, 400 mg, Oral, TID, Rachel Stark DO, 400 mg at 12/03/20 9825    lactobacillus (CULTURELLE) capsule 1 capsule, 1 capsule, Oral, Daily, Rachel Stark DO, 1 capsule at 12/03/20 0620    ondansetron (ZOFRAN) injection 4 mg, 4 mg, Intravenous, Q6H PRN, Rachel Stark DO, 4 mg at 11/30/20 0305    acetaminophen (TYLENOL) tablet 650 mg, 650 mg, Oral, Q6H PRN, Rachel Stark DO, 650 mg at 11/30/20 0930    metoprolol tartrate (LOPRESSOR) tablet 25 mg, 25 mg, Oral, BID, Elease Meth Mj, DO, 25 mg at 12/03/20 7404    levoFLOXacin (LEVAQUIN) tablet 750 mg, 750 mg, Oral, Every Other Day, Jaki Goldman MD, 750 mg at 12/02/20 2241    [COMPLETED] remdesivir 200 mg in sodium chloride 0.9 % 250 mL IVPB, 200 mg, Intravenous, Once, Stopped at 11/30/20 1930 **FOLLOWED BY** remdesivir 100 mg in sodium chloride 0.9 % 250 mL IVPB, 100 mg, Intravenous, Q24H, Jaki Goldman MD, Stopped at 12/03/20 0045    0.9 % sodium chloride bolus, 30 mL, Intravenous, PRN, Jaki Goldman MD    hydroxyurea [facility]) chemo capsule 1,000 mg, 1,000 mg, Oral, Daily, Nina Stevenson MD, 1,000 mg at 12/03/20 0945    Assessment:    Active Problems:    COVID-19  Resolved Problems:    * No resolved hospital problems.  *    75 year old female known to [facility] with myeloproliferative disorder with leukocytosis/thrombocytosis/polycythemia diagnosed in 2007. 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 She recently is being further evaluated for monoclonal gammopathy-needing bone marrow biopsy in the future. She presented with body aches, fatigue and fever with cough. Found to be COVID-19 +. He has been sick for the past week having fevers up to 101, generalized body aches, nasal congestion as well as a dry cough. She also felt like she has had some dysuria. Labs on admission show WBC 25.3, RBC 3.44, RADHA globin 10.4, hematocrit 35.8, .1, platelets 028, sodium 127, BUN 73, creatinine 3.4. UTI +. CT the chest showed multifocal bilateral groundglass and semisolid pulmonary infiltrates most consistent with COVID-19 pneumonia, CT the abdomen/pelvis showed mild mild diverticulitis noted in the distal descending colon sigmoid colon junction, there is no abscesses or free air, splenomegaly, 8 mm nonobstructing calculus within the right renal pelvis. CXR shows scattered bilateral airspace opacities may present represent infectious process. Plan:  Monitor CBC daily   Patient will need to stay on hydrea regardless of hemoglobin count, maintaining good control of platelet count to help decrease her risk of thrombotic events.    Continue IVF  D/C eliquis and started heparin gtt, watching renal function closely    She does have an elevated monoclonal protein, she will need bone marrow biopsy outpatient  Full supportive care    Electronically signed by ALFREDA Sr NP on 12/3/2020 at 12:57 PM

## 2020-12-03 NOTE — PROGRESS NOTES
Subjective: The patient is awake and alert. Can not sleep  Muscle pain continues  On 4 liters of O2 but clinically improved    Objective:    /68   Pulse 75   Temp 97.8 °F (36.6 °C) (Oral)   Resp 16   Ht 5' 5\" (1.651 m)   Wt 188 lb (85.3 kg)   SpO2 92%   BMI 31.28 kg/m²     Heart:  RRR, no murmurs, gallops, or rubs.   Lungs:  CTA bilaterally, no wheeze, rales or rhonchi  Abd: bowel sounds present, nontender, nondistended, no masses  Extrem:  No clubbing, cyanosis, or edema    CBC with Differential:    Lab Results   Component Value Date    WBC 5.3 12/03/2020    RBC 2.91 12/03/2020    HGB 8.6 12/03/2020    HCT 29.7 12/03/2020     12/03/2020    .1 12/03/2020    MCH 29.6 12/03/2020    MCHC 29.0 12/03/2020    RDW 21.3 12/03/2020    NRBC 0.0 12/02/2020    SEGSPCT 82 12/29/2013    BLASTSPCT 4.0 02/27/2020    METASPCT 0.9 02/29/2020    LYMPHOPCT 6.8 12/03/2020    MONOPCT 14.7 12/03/2020    MYELOPCT 0.9 02/29/2020    BASOPCT 0.8 12/03/2020    MONOSABS 0.78 12/03/2020    LYMPHSABS 0.36 12/03/2020    EOSABS 0.00 12/03/2020    BASOSABS 0.04 12/03/2020     CMP:    Lab Results   Component Value Date     12/03/2020    K 3.7 12/03/2020    K 3.7 12/03/2020    CL 97 12/03/2020    CO2 32 12/03/2020    BUN 48 12/03/2020    CREATININE 1.7 12/03/2020    GFRAA 36 12/03/2020    LABGLOM 30 12/03/2020    GLUCOSE 138 12/03/2020    PROT 6.5 12/03/2020    LABALBU 2.5 12/03/2020    CALCIUM 7.9 12/03/2020    BILITOT 0.3 12/03/2020    ALKPHOS 67 12/03/2020    AST 64 12/03/2020    ALT 15 12/03/2020     PT/INR:    Lab Results   Component Value Date    PROTIME 19.0 12/02/2020    INR 1.6 12/02/2020     @cktotal:3,ckmb:3,ckmbindex:3,troponini:3@  U/A:    Lab Results   Component Value Date    NITRU Negative 11/29/2020    COLORU Yellow 11/29/2020    PHUR 5.0 11/29/2020    LABCAST FEW 08/27/2019    WBCUA 2-5 11/29/2020    RBCUA 5-10 11/29/2020    BACTERIA MODERATE 11/29/2020    CLARITYU Clear 11/29/2020    SPECGRAV

## 2020-12-03 NOTE — PROGRESS NOTES
Patient request to stop primary infusion  at this time, as she wants to rest, will check back soon with patient to restart primary infusion.

## 2020-12-04 NOTE — PROGRESS NOTES
[facility] MD Yelena Contreras, MD Kenny Maynard, MD Ulysses Pacer, MD Hilary Logan, CNP   Jimena Harrison, MICK  Progress Note    12/4/2020    SUBJECTIVE:   12/1: Resting comfortably  No new complaint. Still O2 dependent, though comfortable on room air. Bicarb drip running. Heparin dripstarted. (-) c/o's  (-) sob/lu/cp/palp Appetite ok  12/2: Status quo. Feeling better. Ongoing fatigue, generalized weakness, poor exercise tolerance. Continued poor intake. 12/3:  Doing well. No new issues. PROBLEM LIST:    Active Problems:    COVID-19  Resolved Problems:    * No resolved hospital problems.  *         DIET:    DIET GENERAL;     MEDS (scheduled):    clonazePAM  0.5 mg Oral BID    apixaban  5 mg Oral BID    dexamethasone  6 mg Oral 2 times per day    vitamin D  2,000 Units Oral Daily    sertraline  25 mg Oral QPM    guaiFENesin  400 mg Oral TID    lactobacillus  1 capsule Oral Daily    metoprolol tartrate  25 mg Oral BID    levoFLOXacin  750 mg Oral Every Other Day    remdesivir IVPB  100 mg Intravenous Q24H    hydroxyurea  1,000 mg Oral Daily       MEDS (infusions):      MEDS (prn):  guaiFENesin-dextromethorphan, heparin (porcine), heparin (porcine), HYDROcodone 5 mg - acetaminophen, ondansetron, acetaminophen, sodium chloride    PHYSICAL EXAM:     Patient Vitals for the past 24 hrs:   BP Temp Temp src Pulse Resp SpO2   12/04/20 0827 (!) 142/76 97.2 °F (36.2 °C) Axillary 99 18 93 %   12/03/20 2024 (!) 168/81 97 °F (36.1 °C) Axillary 67 16 92 %   @      Intake/Output Summary (Last 24 hours) at 12/4/2020 1344  Last data filed at 12/4/2020 0516  Gross per 24 hour   Intake 1594 ml   Output 1625 ml   Net -31 ml         Wt Readings from Last 3 Encounters:   12/02/20 188 lb (85.3 kg)   11/03/20 190 lb (86.2 kg)   08/05/20 190 lb (86.2 kg)       PE:  Due to COVID-19 isolation protocol, did not conduct physical examination, though discussed her physical examination with her bedside RN. DATA:    Recent Labs     12/02/20  0311 12/03/20  0340 12/04/20  0525   WBC 8.3 5.3 5.9   HGB 8.6* 8.6* 8.9*   HCT 28.1* 29.7* 31.2*   MCV 99.3 102.1* 105.8*   * 479* 486*     Recent Labs     12/02/20 0311 12/03/20  0340 12/04/20  0525    138 141   K 4.0 3.7  3.7 3.9  3.9   CL 95* 97* 100   CO2 23 32* 28   MG  --  1.4* 1.8   PHOS  --  3.3 3.2   BUN 63* 48* 33*   CREATININE 2.0* 1.7* 1.4*   ALT 16 15 18   AST 77* 64* 68*   BILITOT <0.2 0.3 0.2   ALKPHOS 72 67 68       No results found for: LABPROT      ASSESSMENT:  1. Acute kidney injury, due to prerenal azotemia secondary to hemodynamic derangement due to sepsis, volume depletion. 2.  Chronic kidney disease stage IIIb. Baseline creatinine 1.3 to 1.4.    3.  Metabolic acidosis due to #1 and #2.    4.  COVID 19 infection with pneumonia. 5.  History of bilateral DVTs. History of PEs. She is chronically on Eliquis. 6.  History of myeloproliferative disorder. She receives hydroxyurea 500 mg daily b.i.d.     Renal function improving  Nonoliguric  Acidosis improved       RECOMMENDATIONS:   1.  ok for d/c from renal pov  2.   Follow labs -- check on Monday:  Bmp, po4, mg    Electronically signed by Jesse Glez MD on 12/4/2020

## 2020-12-04 NOTE — CARE COORDINATION
Social Work / Discharge Planning : COVID POSITIVE: Patient has a discharge order noted. Patient will need 02. REDDY called Otilio Alan at [facility] and aware of 02 need as well for discharge for today. Will need DME order. Also, REDDY did contact Marine at [facility] and updated her as well. Both [facility] and [facility] aware that shyla Nieves [phone] is the contact to make arrangements for services. REDDY did speak to shyla Musa. SW to follow.   . Electronically signed by NIKO Correa on 12/4/20 at 10:21 AM EST

## 2020-12-04 NOTE — PROGRESS NOTES
Subjective: The patient is awake and alert. Breathing better  Still some shortness of breath  Objective:    BP (!) 168/81   Pulse 67   Temp 97 °F (36.1 °C) (Axillary)   Resp 16   Ht 5' 5\" (1.651 m)   Wt 188 lb (85.3 kg)   SpO2 92%   BMI 31.28 kg/m²     Heart:  RRR, no murmurs, gallops, or rubs.   Lungs:  CTA bilaterally, no wheeze, rales or rhonchi  Abd: bowel sounds present, nontender, nondistended, no masses  Extrem:  No clubbing, cyanosis, or edema    CBC with Differential:    Lab Results   Component Value Date    WBC 5.9 12/04/2020    RBC 2.95 12/04/2020    HGB 8.9 12/04/2020    HCT 31.2 12/04/2020     12/04/2020    .8 12/04/2020    MCH 30.2 12/04/2020    MCHC 28.5 12/04/2020    RDW 21.7 12/04/2020    NRBC 0.0 12/02/2020    SEGSPCT 82 12/29/2013    BLASTSPCT 4.0 02/27/2020    METASPCT 0.9 02/29/2020    LYMPHOPCT 6.8 12/03/2020    MONOPCT 14.7 12/03/2020    MYELOPCT 0.9 02/29/2020    BASOPCT 0.8 12/03/2020    MONOSABS 0.78 12/03/2020    LYMPHSABS 0.36 12/03/2020    EOSABS 0.00 12/03/2020    BASOSABS 0.04 12/03/2020     CMP:    Lab Results   Component Value Date     12/04/2020    K 3.9 12/04/2020     12/04/2020    CO2 28 12/04/2020    BUN 33 12/04/2020    CREATININE 1.4 12/04/2020    GFRAA 45 12/04/2020    LABGLOM 37 12/04/2020    GLUCOSE 120 12/04/2020    PROT 6.6 12/04/2020    LABALBU 2.5 12/04/2020    CALCIUM 8.0 12/04/2020    BILITOT 0.2 12/04/2020    ALKPHOS 68 12/04/2020    AST 68 12/04/2020    ALT 18 12/04/2020     PT/INR:    Lab Results   Component Value Date    PROTIME 19.0 12/02/2020    INR 1.6 12/02/2020     @cktotal:3,ckmb:3,ckmbindex:3,troponini:3@  U/A:    Lab Results   Component Value Date    NITRU Negative 11/29/2020    COLORU Yellow 11/29/2020    PHUR 5.0 11/29/2020    LABCAST FEW 08/27/2019    WBCUA 2-5 11/29/2020    RBCUA 5-10 11/29/2020    BACTERIA MODERATE 11/29/2020    CLARITYU Clear 11/29/2020    SPECGRAV >=1.030 11/29/2020    UROBILINOGEN 0.2 11/29/2020 BILIRUBINUR MODERATE 11/29/2020    BILIRUBINUR neg 10/03/2019    BLOODU LARGE 11/29/2020    GLUCOSEU Negative 11/29/2020    KETUA TRACE 11/29/2020    AMORPHOUS MODERATE 11/29/2020        Assessment:    Patient Active Problem List   Diagnosis    DVT, recurrent, lower extremity, acute (HCC)    Polycythemia vera ([address]    Venous insufficiency of leg    History of DVT (deep vein thrombosis)    Epistaxis    Septic thrombophlebitis    Bilateral pulmonary embolism ([address]    PE (pulmonary thromboembolism) ([address]    Lung mass    COVID-19       Plan:    Covid 19  Last dose of remdesivir today day 5  Day 5 of 7 levaquin for RLL pneumonia  Home today if ok with consultants  Likely will need home o2  Additional 1 week of quarantine      DORYS  Kidney function return to normal  appears bit overloaded  Low dose lasix today  Needs to drink enough at home    Pauline Singleton DO  6:46 AM  12/4/2020

## 2020-12-04 NOTE — PROGRESS NOTES
[address] [place] [address] [facility]  Progress Note    SUBJECTIVE:  Chief Complaint   Patient presents with    Fever    Flank Pain     pt states she thinks she has a UTI    Fatigue     No new problems reported. Feeling better. Minimal dyspnea, this recurring mostly on exertion. Review of systems:  As stated above in the chief complaint, otherwise negative. Medications:  Scheduled Meds:   clonazePAM  0.5 mg Oral BID    apixaban  5 mg Oral BID    dexamethasone  6 mg Oral 2 times per day    vitamin D  2,000 Units Oral Daily    sertraline  25 mg Oral QPM    guaiFENesin  400 mg Oral TID    lactobacillus  1 capsule Oral Daily    metoprolol tartrate  25 mg Oral BID    levoFLOXacin  750 mg Oral Every Other Day    remdesivir IVPB  100 mg Intravenous Q24H    hydroxyurea  1,000 mg Oral Daily     Continuous Infusions:    PRN Meds:guaiFENesin-dextromethorphan, heparin (porcine), heparin (porcine), HYDROcodone 5 mg - acetaminophen, ondansetron, acetaminophen, sodium chloride    OBJECTIVE:  BP (!) 142/76   Pulse 99   Temp 97.2 °F (36.2 °C) (Axillary)   Resp 18   Ht 5' 5\" (1.651 m)   Wt 188 lb (85.3 kg)   SpO2 93%   BMI 31.28 kg/m²   Temp  Av.1 °F (36.2 °C)  Min: 97 °F (36.1 °C)  Max: 97.2 °F (36.2 °C)  Constitutional: The patient is lying on the couch. No distress. 4 L nasal cannula. Skin: Warm and dry. No rashes were noted. Lying on the couch. HEENT: Eyes closed. Neck: Supple to movements. Chest: No respiratory distress. Some crackles heard over the right base posteriorly. Cardiovascular: Heart sounds rhythmic and regular. Abdomen: Positive bowel sounds to auscultation. Extremities: No edema.   Lines: peripheral    Laboratory and Tests Review:  Lab Results   Component Value Date    WBC 5.9 2020    WBC 5.3 2020    WBC 8.3 2020    HGB 8.9 (L) 2020    HCT 31.2 (L) 2020    .8 (H) 2020     (H) 2020     Lab Results Component Value Date    NEUTROABS 4.43 12/04/2020    NEUTROABS 3.79 12/03/2020    NEUTROABS 7.89 (H) 12/02/2020     No results found for: CRPHS  Lab Results   Component Value Date    ALT 18 12/04/2020    AST 68 (H) 12/04/2020    ALKPHOS 68 12/04/2020    BILITOT 0.2 12/04/2020     Lab Results   Component Value Date     12/04/2020    K 3.9 12/04/2020    K 3.9 12/04/2020     12/04/2020    CO2 28 12/04/2020    BUN 33 12/04/2020    CREATININE 1.4 12/04/2020    CREATININE 1.7 12/03/2020    CREATININE 2.0 12/02/2020    GFRAA 45 12/04/2020    LABGLOM 37 12/04/2020    GLUCOSE 120 12/04/2020    PROT 6.6 12/04/2020    LABALBU 2.5 12/04/2020    CALCIUM 8.0 12/04/2020    BILITOT 0.2 12/04/2020    ALKPHOS 68 12/04/2020    AST 68 12/04/2020    ALT 18 12/04/2020     Lab Results   Component Value Date    CRP 2.9 (H) 12/04/2020    CRP 4.5 (H) 12/03/2020    CRP 7.2 (H) 12/02/2020     Lab Results   Component Value Date    SEDRATE 83 (H) 12/03/2020    SEDRATE 89 (H) 11/30/2020    SEDRATE 93 (H) 02/22/2020     Radiology:      Microbiology:   Blood cultures 11/30/2020: Negative so far  Respiratory panel: SARS-CoV-2 detected   Streptococcus pneumoniae/Legionella urine Ag: negative   Nares screen MRSA: Negative    No results for input(s): PROCAL in the last 72 hours.     ASSESSMENT:  · SARS-CoV-2 infection with viral pneumonia  · Probable superimposed bacterial pneumonia right lower lobe  · Myeloproliferative disorder  · Lymphopenia  · Immunocompromised host  · CKD    PLAN:  · Continue Remdesivir, day 5  · Continue Levofloxacin, day 5 of 7  · Anticoagulation  · Steroids  · The patient can go home from [place] [id number]  12:52 PM  12/4/2020

## 2020-12-04 NOTE — PROGRESS NOTES
Spoke with patients family, portable oxygen en route.    Electronically signed by Homar Cook RN on 12/4/2020 at 5:18 PM

## 2020-12-04 NOTE — PROGRESS NOTES
Subjective:    Patient is reported feeling better today. She has been wened to 3L oxygen. She is tolerating her diet, NO N&V. No  or GI blood loss. Objective:    BP (!) 142/76   Pulse 99   Temp 97.2 °F (36.2 °C) (Axillary)   Resp 18   Ht 5' 5\" (1.651 m)   Wt 188 lb (85.3 kg)   SpO2 93%   BMI 31.28 kg/m²      No exam due to COVID-19 pandemic precautions.         CBC with Differential:    Lab Results   Component Value Date    WBC 5.9 12/04/2020    RBC 2.95 12/04/2020    HGB 8.9 12/04/2020    HCT 31.2 12/04/2020     12/04/2020    .8 12/04/2020    MCH 30.2 12/04/2020    MCHC 28.5 12/04/2020    RDW 21.7 12/04/2020    NRBC 1.0 12/04/2020    SEGSPCT 82 12/29/2013    BLASTSPCT 4.0 02/27/2020    METASPCT 0.9 02/29/2020    LYMPHOPCT 3.0 12/04/2020    MONOPCT 20.0 12/04/2020    MYELOPCT 0.9 02/29/2020    BASOPCT 0.0 12/04/2020    MONOSABS 1.18 12/04/2020    LYMPHSABS 0.30 12/04/2020    EOSABS 0.00 12/04/2020    BASOSABS 0.00 12/04/2020     CMP:    Lab Results   Component Value Date     12/04/2020    K 3.9 12/04/2020    K 3.9 12/04/2020     12/04/2020    CO2 28 12/04/2020    BUN 33 12/04/2020    CREATININE 1.4 12/04/2020    GFRAA 45 12/04/2020    LABGLOM 37 12/04/2020    GLUCOSE 120 12/04/2020    PROT 6.6 12/04/2020    LABALBU 2.5 12/04/2020    CALCIUM 8.0 12/04/2020    BILITOT 0.2 12/04/2020    ALKPHOS 68 12/04/2020    AST 68 12/04/2020    ALT 18 12/04/2020         Current Facility-Administered Medications:     clonazePAM (KLONOPIN) tablet 0.5 mg, 0.5 mg, Oral, BID, Sean Antis Mj, DO, 0.5 mg at 12/04/20 3982    apixaban (ELIQUIS) tablet 5 mg, 5 mg, Oral, BID, Sean Antis Mj, DO, 5 mg at 12/04/20 0830    guaiFENesin-dextromethorphan (ROBITUSSIN DM) 100-10 MG/5ML syrup 5 mL, 5 mL, Oral, Q6H PRN, Ronne Sox, DO, 5 mL at 12/03/20 1539    heparin (porcine) injection 6,530 Units, 80 Units/kg, Intravenous, PRN, Ronne Sox, DO, 6,530 Units at 12/02/20 0517    heparin (porcine) injection 3,260 Units, 40 Units/kg, Intravenous, PRN, Paige Maria DO    HYDROcodone-acetaminophen [facility]) 5-325 MG per tablet 1 tablet, 1 tablet, Oral, Q6H PRN, Paige Maria DO, 1 tablet at 12/03/20 2030    dexamethasone (DECADRON) tablet 6 mg, 6 mg, Oral, 2 times per day, Marvetta Severs, MD, 6 mg at 12/04/20 0830    vitamin D (CHOLECALCIFEROL) tablet 2,000 Units, 2,000 Units, Oral, Daily, Paige Maria DO, 2,000 Units at 12/04/20 0830    sertraline (ZOLOFT) tablet 25 mg, 25 mg, Oral, QPM, Paige Maria DO, 25 mg at 12/03/20 2031    guaiFENesin tablet 400 mg, 400 mg, Oral, TID, Paige Maria DO, 400 mg at 12/04/20 0830    lactobacillus (CULTURELLE) capsule 1 capsule, 1 capsule, Oral, Daily, Paige Maria DO, 1 capsule at 12/04/20 0830    ondansetron (ZOFRAN) injection 4 mg, 4 mg, Intravenous, Q6H PRN, Paige Maria DO, 4 mg at 11/30/20 0305    acetaminophen (TYLENOL) tablet 650 mg, 650 mg, Oral, Q6H PRN, Paige Maria DO, 650 mg at 11/30/20 0930    metoprolol tartrate (LOPRESSOR) tablet 25 mg, 25 mg, Oral, BID, Summer Rodriguez Mj, DO, 25 mg at 12/04/20 0830    levoFLOXacin (LEVAQUIN) tablet 750 mg, 750 mg, Oral, Every Other Day, Marvetta Severs, MD, 750 mg at 12/02/20 2241    [COMPLETED] remdesivir 200 mg in sodium chloride 0.9 % 250 mL IVPB, 200 mg, Intravenous, Once, Stopped at 11/30/20 1930 **FOLLOWED BY** remdesivir 100 mg in sodium chloride 0.9 % 250 mL IVPB, 100 mg, Intravenous, Q24H, Marvetta Severs, MD, Stopped at 12/03/20 2133    0.9 % sodium chloride bolus, 30 mL, Intravenous, PRN, Marvetta Severs, MD    hydroxyurea [facility]) chemo capsule 1,000 mg, 1,000 mg, Oral, Daily, Lizet Westfall MD, 1,000 mg at 12/04/20 2136    Assessment:    Active Problems:    COVID-19  Resolved Problems:    * No resolved hospital problems. *    75 year old female known to [facility] with myeloproliferative disorder with leukocytosis/thrombocytosis/polycythemia diagnosed in 2007.  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 She recently is being further evaluated for monoclonal gammopathy-needing bone marrow biopsy in the future. Admitted with body aches, nasal congestion, fatigue and fever with cough. Found to be COVID-19 + PNA and UTI +. Labs on admission show WBC 25.3, hematocrit 35.8, .1, platelets 875, sodium 127, BUN 73, creatinine 3.4. CT chest showed multifocal bilateral groundglass and semisolid pulmonary infiltrates most consistent with COVID-19 PNA, CT the abdomen/pelvis showed mild mild diverticulitis noted in the distal descending colon sigmoid colon junction, there is no abscesses or free air, splenomegaly, 8 mm nonobstructing calculus within the right renal pelvis. CXR shows scattered bilateral airspace opacities may present represent infectious process. Plan:  Labs are fairly stable today, platelet count remains elevated at 486 and Hgb remains low at 8.9. Renal function is starting to improve. Continue Hydrea 1000 mg daily to improve platelet count to help decrease her risk of thrombotic events.    Continue IVF  Continue heparin gtt given her decreased renal function   She does have an elevated monoclonal protein and LDH, she will need bone marrow biopsy outpatient  Levaquin   Remdesivir day 4 today  Continue full supportive care    Electronically signed by Lili Krause MD on 12/4/2020 at 2:36 PM

## 2020-12-05 PROBLEM — I46.9 CARDIAC ARREST (HCC): Status: ACTIVE | Noted: 2020-01-01

## 2020-12-05 PROBLEM — R56.9 SEIZURE-LIKE ACTIVITY (HCC): Status: ACTIVE | Noted: 2020-01-01

## 2020-12-05 PROBLEM — R09.2 RESPIRATORY ARREST (HCC): Status: ACTIVE | Noted: 2020-01-01

## 2020-12-05 PROBLEM — U07.1 PNEUMONIA DUE TO COVID-19 VIRUS: Status: ACTIVE | Noted: 2020-01-01

## 2020-12-05 PROBLEM — E87.20 LACTIC ACIDOSIS: Status: ACTIVE | Noted: 2020-01-01

## 2020-12-05 PROBLEM — J12.82 PNEUMONIA DUE TO COVID-19 VIRUS: Status: ACTIVE | Noted: 2020-01-01

## 2020-12-05 NOTE — PROCEDURES
Arterial line placement    Procedure: Right radial arterial line placement. Indications: Continuous monitoring of blood pressure in a patient with hypotension +/- shock, on Levophed. Anesthesia: Local infiltration of 1% lidocaine. Consent:  Unable to be obtained due to the emergent nature of this procedure. Technique: Time Out: Immediately prior to the procedure a \"timeout\" was called to verify the correct patient and procedure. Procedure was done using strict aseptic technique. Justin's test was performed and was normal. right radial site was cleaned with chloraprep and draped. Radial artery was identified, then Lidocaine 1% was infiltrated locally. Radial arterial line was inserted, a good blood flow was obtained, after which guidewire was inserted all the way with no resistance. Then the canula was inserted and needle with guidewire was withdrawn. Pulsatile bright red blood flow was observed. The canula was connected to BP monitoring apparatus and a good quality waveform was noted. Then the canula was secured with 2 stay sutures of 3-0 silk after Lidocaine infiltration, following which dressing was applied. Number of sticks: 3. Number of Kits used: 2. Complications: No immediate complication. Estimated blood loss: About 5 ml. Comment: Patient tolerated the procedure well.      Heath Tsai MD PGY-1  12/5/2020 4:42 PM

## 2020-12-05 NOTE — ED NOTES
Pulling eugenio airway and ETT placed   End tidal 63 pt pulse ox 57%     Frederic Nino RN  12/05/20 1894

## 2020-12-05 NOTE — ED PROVIDER NOTES
Mucous membranes are moist.      Pharynx: Oropharynx is clear. No oropharyngeal exudate or posterior oropharyngeal erythema. Eyes:      Pupils: Pupils are equal, round, and reactive to light. Neck:      Musculoskeletal: Neck supple. Cardiovascular:      Rate and Rhythm: Normal rate and regular rhythm. Pulmonary:      Effort: No respiratory distress. Breath sounds: No wheezing or rales. Comments: Coarse bilateral breath sounds appreciated  Abdominal:      General: There is no distension. Palpations: There is no mass. Tenderness: There is no abdominal tenderness. There is no guarding or rebound. Hernia: No hernia is present. Skin:     General: Skin is warm and dry. Comments: Patient has cyanotic, mottled appearing skin, most notable in the chest and upper extremities   Neurological:      Comments: Pupils initially minimally responsive at 5 mm dilation. Pupils became more appropriately responsive and 3 mm as resuscitation progressed. Psychiatric:      Comments: Not able to be evaluated secondary to the acuity of the patient's condition              Central Line Placement Procedure Note    Indication: poor peripheral access and centrally administered medications    Consent: Unable to be obtained due to the emergent nature of this procedure. Procedure: The patient was positioned appropriately and the skin over the right femoral vein was prepped with Chloraprep. Local anesthesia was not performed due to the emergent nature of this procedure. A large bore needle was used to identify the vein. A guide wire was then inserted into the vein through the needle. A triple lumen catheter was then inserted into the vessel over the guide wire using the Seldinger technique. All ports showed good, free flowing blood return and were flushed with saline solution. The catheter was then securely fastened to the skin with suture at 20 cm. Two sutures were placed into the proximal eyelets. An antibiotic disk was placed and the site was then covered with a sterile dressing. A post procedure X-ray was not indicated. The patient tolerated the procedure well. Complications: None              Intubation Procedure Note    Indication: Respiratory failure, airway compromise, hypoxia, hypercarbia and airway protection    Consent: Unable to be obtained due to the emergent nature of this procedure. Medications Used: None    Procedure: The patient was placed in the appropriate position. Cricoid pressure was not required. Intubation was performed Video Laryngoscopy with an an 8.0 cuffed endotracheal tube. The cuff was then inflated and the tube was secured appropriately at a distance of 23 cm to the dental ridge. Initial confirmation of placement included bilateral breath sounds, an end tidal CO2 detector, absence of sounds over the stomach, tube fogging, adequate chest rise, adequate pulse oximetry reading and improved skin color. A chest x-ray to verify correct placement of the tube has been ordered but is still pending. The patient tolerated the procedure well. Complications: None                Procedures      MDM  Number of Diagnoses or Management Options  Diagnosis management comments: Emergency department evaluation was notable for a postarrest patient with known hypoxia who suffered a likely hypoxic arrest with prolonged period of anoxia. Jahaira Yarbrough was provided a definitive airway and central access was obtained in the immediate resuscitative phase. Pressors were started which did cause an improvement in her blood pressure, her O2 saturation also improved into the 90s with ventilation. Initial ABG showed hypercarbia and respiratory acidosis, however this was taken immediately after the patient was intubated and O2 saturations are still improving. This information was relayed to the patient who understood this plan of care and was amenable to the plan.   Patient was discussed with [id number]     Time Analyzed 1035     Source: Blood Arterial     pH, Blood Gas 6.950 (LL) 7.350 - 7.450    PCO2 88.8 (HH) 35.0 - 45.0 mmHg    PO2 86.7 75.0 - 100.0 mmHg    HCO3 19.1 (L) 22.0 - 26.0 mmol/L    B.E. -13.1 (L) -3.0 - 3.0 mmol/L    O2 Sat 88.1 (L) 92.0 - 98.5 %    PO2/FIO2 0.87 mmHg/%    AaDO2 512.5 mmHg    RI(T) 591 %    O2Hb 87.1 (L) 94.0 - 97.0 %    COHb 0.4 0.0 - 1.5 %    MetHb 0.7 0.0 - 1.5 %    O2 Content 11.0 mL/dL    HHb 11.8 (H) 0.0 - 5.0 %    tHb (est) 8.9 (L) 11.5 - 16.5 g/dL    Mode Bagging     FIO2 100.0 %    Date Of Collection      Time Collected      Pt Temp 37.0 C     ID [id number]     Lab [id number]     Critical(s) Notified Handed report to Dr/RN    CBC Auto Differential   Result Value Ref Range    WBC 31.5 (H) 4.5 - 11.5 E9/L    RBC 2.62 (L) 3.50 - 5.50 E12/L    Hemoglobin 8.0 (L) 11.5 - 15.5 g/dL    Hematocrit 30.4 (L) 34.0 - 48.0 %    .0 (H) 80.0 - 99.9 fL    MCH 30.5 26.0 - 35.0 pg    MCHC 26.3 (L) 32.0 - 34.5 %    RDW 22.2 (H) 11.5 - 15.0 fL    Platelets 958 335 - 011 E9/L    MPV 12.2 (H) 7.0 - 12.0 fL   Protime-INR   Result Value Ref Range    Protime 32.2 (H) 9.3 - 12.4 sec    INR 2.8    POCT Venous   Result Value Ref Range    POC Sodium 139 132 - 146 mmol/L    POC Potassium 3.4 (L) 3.5 - 5.0 mmol/L    POC Chloride 104 100 - 108 mmol/L    POC Glucose 249 (H) 74 - 99 mg/dl    POC Creatinine 1.9 (H) 0.5 - 1.0 mg/dL    GFR Non-African American 26 >=60 mL/min/1.73    GFR  32     Performed on SEE BELOW    EKG 12 Lead   Result Value Ref Range    Ventricular Rate 146 BPM    Atrial Rate 146 BPM    P-R Interval 122 ms    QRS Duration 126 ms    Q-T Interval 338 ms    QTc Calculation (Bazett) 526 ms    P Axis 56 degrees    R Axis -23 degrees    T Axis -16 degrees       RADIOLOGY:  CTA CHEST W CONTRAST   Final Result   1. No pulmonary embolism. 2.  Diffuse ground-glass opacities related interstitial pulmonary edema or   pneumonia. 3.  Trace bilateral pleural effusions. CT HEAD WO CONTRAST   Final Result   No acute intracranial abnormality. XR CHEST PORTABLE    (Results Pending)   CT ABDOMEN PELVIS W IV CONTRAST Additional Contrast? None    (Results Pending)       EKG #1:  Interpreted by emergency department physician unless otherwise noted. Time:  1036  Rate: 146  Rhythm: Sinus. Interpretation: nonspecific ST and T waves changes, sinus tachycardia.        ------------------------- NURSING NOTES AND VITALS REVIEWED ---------------------------  Date / Time Roomed:  12/5/2020 10:18 AM  ED Bed Assignment:  16/16    The nursing notes within the ED encounter and vital signs as below have been reviewed. Patient Vitals for the past 24 hrs:   BP Pulse Resp SpO2   12/05/20 1034 115/68 145 20 (!) 86 %   12/05/20 1032 102/68 144 20 (!) 89 %   12/05/20 1031 95/65 141 20 (!) 88 %   12/05/20 1030 82/63 140 20 (!) 86 %   12/05/20 1025 (!) 62/45 118 22 (!) 81 %   12/05/20 1011 (!) 78/59 150 28 (!) 80 %       Oxygen Saturation Interpretation: Abnormal and Improved after treatment    ------------------------------------------ PROGRESS NOTES ------------------------------------------  Re-evaluation(s):  Time: 11:21 AM EST  Patients symptoms show no change  Repeat physical examination is improved    Counseling:  I have spoken with the  and discussed todays results, in addition to providing specific details for the plan of care and counseling regarding the diagnosis and prognosis. Their questions are answered at this time and they are agreeable with the plan of admission.    --------------------------------- ADDITIONAL PROVIDER NOTES ---------------------------------  Consultations:  Time: 12:46 PM EST. Spoke with Dr. Maddison Crockett. Discussed case. They will admit the patient.   This patient's ED course included: a personal history and physicial examination, re-evaluation prior to disposition, multiple bedside re-evaluations, IV medications, cardiac monitoring, continuous pulse oximetry and complex medical decision making and emergency management    This patient has remained hemodynamically stable during their ED course. Critical care:  Critical Care: Please note that the withdrawal or failure to initiate urgent interventions for this patient would likely result in a life threatening deterioration or permanent disability. Accordingly this patient received 60 minutes of critical care time, excluding separately billable procedures. Diagnosis:  1. Cardiac arrest ([facility] [address]    2. Acute respiratory failure with hypoxia and hypercapnia (HCC)    3. Seizure-like activity ([facility] [address]    4. Pneumonia due to COVID-19 virus        Disposition:  Patient's disposition: Admit to CCU/ICU  Patient's condition is critical.      ED Course as of Dec 05 1446   Sat Dec 05, 2020   1136 Notified of critical lab value of 18 for lactic acid    [RK]   1401 I had a very lengthy conversation with the patient's daughter and son regarding the patient's likely poor prognosis. All options were presented. They do wish for aggressive treatment and ICU admission. [MT]      ED Course User Index  [MT] Vaishnavi De Leon DO  [RK] Andrés Spring DO      Please note that the withdrawal or failure to initiate urgent interventions for this patient would likely result in a life threatening deterioration or permanent disability. Accordingly this patient received 60 minutes of critical care time, excluding separately billable procedures. Miguel Vasquez 43., DO  Resident  12/05/20 1446    ATTENDING PROVIDER ATTESTATION:     Ernestina Paul presented to the emergency department for evaluation of Respiratory Arrest (just discharged with covid pt went unresponsive at home)    I have reviewed and discussed the case, including pertinent history (medical, surgical, family and social) and exam findings with the Resident and the Nurse assigned to Ernestina Paul.   I have personally performed and/or participated in the history, exam, medical decision making, and procedures and agree with all pertinent clinical information. I have reviewed my findings and recommendations with Arslan Squires and members of family present at the time of disposition. I, Dr. Keisha Ward am the primary physician of record for this patient. MDM: The patient is 77 y.o. female  with a past medical history of       Diagnosis Date    Anxiety     history of    Breast disorder     History of DVT (deep vein thrombosis) 12/30/2013    Polycythemia vera(238.4)     follows with Dr. Destiny Gunn monthly, treated w Junita  & intermittent phlebotomies    PONV (postoperative nausea and vomiting)     Pulmonary embolism ([facility] [address]     history of    Recurrent epistaxis     Sweet syndrome     Venous insufficiency of leg 12/30/2013    R leg per patient     presenting to the emergency department with a chief complaint of cardiac arrest.  The patient did have ROSC when she arrived in the emergency department. The patient was intubated under my direct supervision as well as had a central line placed. Differential diagnosis includes but not limited to, COVID-19, pneumonia, sepsis, pulmonary embolus, patient did have labs and imaging which were reviewed, CBC remarkable for leukocytosis 31.5, CMP grossly at the patient's baseline, troponin elevated at 0.08, patient with lactic acid of 18.0, ABG remarkable for hypercapnic respiratory failure PCO2 88.8, pH 6.9, critical care consultation, internal medicine consultation, the case was discussed extensively with the patient's . Patient's  does wish to carry out aggressive measures. Critical care:  Critical Care: Please note that the withdrawal or failure to initiate urgent interventions for this patient would likely result in a life threatening deterioration or permanent disability. Accordingly this patient received 60 minutes of critical care time, excluding separately billable procedures.         My findings/plan: The primary encounter diagnosis was Cardiac arrest ([facility] [address] Diagnoses of Acute respiratory failure with hypoxia and hypercapnia ([facility] [address] Seizure-like activity ([facility] [address] and Pneumonia due to COVID-19 virus were also pertinent to this visit.   Discharge Medication List as of 12/6/2020  7:16 PM        Dolores Marshall, [address], DO  12/06/20 9268

## 2020-12-05 NOTE — ED NOTES
Bed: 16  Expected date:   Expected time:   Means of arrival:   Comments:  rm [address], RN  12/05/20 1040

## 2020-12-05 NOTE — PROGRESS NOTES
Pharmacy Consultation Note  (Antibiotic Dosing and Monitoring)    Initial consult date: 20  Consulting physician: Dr. Andres Brown   Drug(s): Vancomycin   Indication: Pneumonia    Ht Readings from Last 1 Encounters:   20 5' 5\" (1.651 m)       Age/Gender IBW DW  Allergy Information   77 y.o.  female  85.3 kg  Dilaudid [hydromorphone hcl]               Date  WBC BUN/sCr UOP (mL/kg/hr) Drug/Dose Time   Given Level(s)   (Time) Comments   Day 1   51.2 29/1.9 None charted Vancomycin 1250mg x 1 1306       (#2)            (#3)            (#4)            (#5)            (#6)            Other Antibiotics/Antifungals/Antivirals Start Date Stop Date Comments   Zosyn 4.5gm                            Estimated CrCL: 31 mL/min, sCr worsening at this time      Intake/Output Summary (Last 24 hours) at 2020 1828  Last data filed at 2020 1257  Gross per 24 hour   Intake 2100 ml   Output --   Net 2100 ml       Temp max: Temp (24hrs), Av.8 °F (37.1 °C), Min:98.6 °F (37 °C), Max:99.3 °F (37.4 °C)      Cultures:    Culture Date Result    Nasal MRSA pending  Pending   Urine  Pending   Urine antigens  Pending   Respiratory   Pending   Blood culture #1  Pending   Blood culture #2  Pending       Assessment:  · Consulted by Dr. Andres Brown to dose/monitor vancomycin  · Goal trough level:  15-20 mcg/mL  · Patient currently in DORYS with worsening sCr (1.6 to 1.9 while in the ED). Expect very slow vancomycin clearance given DORYS and hypotension requring multiple vasopressors to maintain MAP    Plan:  · Dose vancomycin by levels at this time  · Ordering vanco level with AM labs which will be around 18 hours post 15mg/kg dose in the ED. Will re-dose if level is <20 mcg/mL. · Pharmacist will follow and monitor/adjust dosing as necessary    Thank you for this consult, please page with questions.     Juwan Woods, PharmD, [address] 2020 6:36 PM  Pharmacy Clinical Specialist, Emergency Medicine  [phone]

## 2020-12-05 NOTE — ED NOTES
Pt bleeding from L.AC peripheral IV site. IV removed and pressure dressing applied.       Aga Lopez RN  12/05/20 5122

## 2020-12-05 NOTE — ED NOTES
Pt o2 saturation dropping Dr Marylu Ashley asking to take pt off vent and to bag pt to get pulse ox up     Judy Bustamante RN  12/05/20 3350

## 2020-12-05 NOTE — ED NOTES
Pt's  would like to be notified with any change in medical condition and/or when pt is transferred to the ICU   [id number], MSW, ANNELW  12/05/20 9821

## 2020-12-05 NOTE — ED NOTES
Heart rate 154 pulse ox 54% with eugenio airway in place Dr Tequila Loyola preparing to place ett tube     Rodney Stapleton RN  12/05/20 3189

## 2020-12-05 NOTE — ED NOTES
ON EMS arrival pt was astolye given 2 epi 1 atropine and 1 bicarb pta pt arrested again 3 mins PTA     Madison Dorado RN  12/05/20 1835

## 2020-12-05 NOTE — CONSULTS
[facility]  Internal Medicine Residency Program  History and Physical  MICU    Patient:  Babar Santos 77 y.o. female MRN: [id number]     Date of Service: 12/5/2020    Hospital Day: 1      Chief complaint: Unresponsiveness  Reason for MICU: Cardiac arrest, Covid positive  History of Present Illness   The patient is a 77 y.o. female with a PMHx of DVT s/p IVC (on Eliquis) and polycythemia vera (on Hydrea and intermittent phlebotomy) who presented due to cardiac arrest after being found unresponsive at home. Patient was admitted in [facility] from 11/30-12/4 for COVID-19 pneumonia. She received remdesivir for 5 days, steroids, and was discharged on 4 L nasal cannula. On the morning of admission, she was noted to have increasing shortness of breath worsened by movement with O2 saturation of 70s. She increased her oxygen supplementation to 6 L but was still hypoxemic with persistent shortness of breath. Her  found her unresponsive at home. She did not receive resuscitative methods for about 5 to 10 minutes. EMS then arrived and found the patient to be in asystole with O2 30%. Supraglottic airway was placed, CPR was done, and ROSC was achieved. On route to [facility] patient had a second cardiac arrest with ROSC again. Upon arrival to the ED patient was given 2 rounds of epi, 1 atropine, and 1 bicarb. She was hypotensive (BP 78/59), tachycardic (), hypoxic (O2 80%). She was then intubated and started on pressors. Pertinent labs include creatinine 1.6, anion gap 32, WBC 31.5, Hgb 8, lactic acid 12.9, proBNP 26,918, troponin 0 0.07, , ALT 53. CT head showed no acute findings. CT abdomen pelvis showed an IVC filter in place. CTA chest showed diffuse groundglass opacities, multiple rib fractures secondary to CPR, no signs of pulmonary embolism. Patient was then transferred to the MICU for further management.     Past Medical History:      Diagnosis Date    Anxiety history of    Breast disorder     History of DVT (deep vein thrombosis) 12/30/2013    Polycythemia vera(238.4)     follows with Dr. Nitesh Lincoln monthly, treated w [facility] Brazil & intermittent phlebotomies    PONV (postoperative nausea and vomiting)     Pulmonary embolism ([facility] [address]     history of    Recurrent epistaxis     Sweet syndrome     Venous insufficiency of leg 12/30/2013    R leg per patient       Past Surgical History:        Procedure Laterality Date    BREAST LUMPECTOMY      X3    COLON SURGERY      KNEE ARTHROSCOPY      Right per patient    OTHER SURGICAL HISTORY  1/28/16    control of nose bleed    OTHER SURGICAL HISTORY Right 08/25/2016    right sphenopalatine artery ligation    SHOULDER SURGERY      R rotator cuff per patient    VENA CAVA FILTER PLACEMENT N/A 12/10/2019    FEMORAL VENA CAVA FILTER INSERTION performed by Makayla Wong MD at [facility] OR       Medications Prior to Admission:    Prior to Admission medications    Medication Sig Start Date End Date Taking? Authorizing Provider   allopurinol (ZYLOPRIM) 100 MG tablet Take 100 mg by mouth daily   Yes Historical Provider, MD   hydroxyurea (HYDREA) 500 MG chemo capsule Take 1,000 mg by mouth 2 times daily   Yes Historical Provider, MD   predniSONE (DELTASONE) 10 MG tablet Take 10 mg by mouth daily   Yes Historical Provider, MD   rizatriptan (MAXALT) 10 MG tablet Take 10 mg by mouth once as needed for Migraine May repeat in 2 hours if needed   Yes Historical Provider, MD   apixaban (ELIQUIS) 5 MG TABS tablet Take 5 mg by mouth 2 times daily   Yes Historical Provider, MD   sertraline (ZOLOFT) 50 MG tablet Take 50 mg by mouth daily    Yes Historical Provider, MD   clonazePAM (KLONOPIN) 0.5 MG tablet Take 1 tablet by mouth 2 times daily for 30 days.  12/4/20 1/3/21  Joanie Downey DO   lactobacillus (CULTURELLE) CAPS capsule Take 1 capsule by mouth daily 12/4/20   Joanie Downey DO   levoFLOXacin (LEVAQUIN) 750 MG tablet Take 1 tablet by mouth daily for 2 days 12/4/20 12/6/20  Nettie Méndez DO   Cholecalciferol (VITAMIN D) 50 MCG (2000 UT) CAPS capsule Take 2,000 Units by mouth daily     Historical Provider, MD       Allergies:  Dilaudid [hydromorphone hcl]    Social History:   TOBACCO:   reports that she quit smoking about 16 years ago. Her smoking use included cigarettes. She has a 5.00 pack-year smoking history. She has never used smokeless tobacco.  ETOH:   reports current alcohol use. Family History:       Problem Relation Age of Onset    Cancer Father     Diabetes Mother     Thyroid Disease Sister        REVIEW OF SYSTEMS:    · Unable to assess due to patient's current condition.     Physical Exam   · Vitals: BP 83/61   Pulse 136   Temp 100.8 °F (38.2 °C) (Bladder)   Resp 23   Ht 5' 5\" (1.651 m)   Wt 188 lb (85.3 kg)   SpO2 91%   BMI 31.28 kg/m²   ABP (Arterial line BP): 98/61  ABP mean (Arterial line mean): 74 mmHg    · General Appearance: Intubated, sedated  · Skin: warm and dry, no rash or erythema  · Head: normocephalic and atraumatic  · Eyes: pupils equal, round, and reactive to light, extraocular eye movements intact, conjunctivae normal  · ENT: tympanic membrane, external ear and ear canal normal bilaterally, nose without deformity, nasal mucosa and turbinates normal without polyps  · Neck: supple and non-tender without mass, no thyromegaly or thyroid nodules, no cervical lymphadenopathy  · Pulmonary/Chest: clear to auscultation bilaterally- no wheezes, rales or rhonchi, normal air movement, no respiratory distress  · Cardiovascular: normal rate, regular rhythm, normal S1 and S2, no murmurs, rubs, clicks, or gallops, distal pulses intact, no carotid bruits  · Abdomen: soft, non-tender, non-distended, normal bowel sounds, no masses or organomegaly  · Extremities: no cyanosis, clubbing or edema  · Musculoskeletal: normal range of motion, no joint swelling, deformity or tenderness  · Neurologic: Unresponsive with myoclonic annetteks    Lines     site day    Art line   R Radial <1   TLC R Fem <1   PICC None    Hemoaccess None      Mechanical Ventilation:   Mode: A/C    TV:500 ml RR: 22  PEEP 8cmH2O FiO2 100    ABG:     Lab Results   Component Value Date    PH 6.950 12/05/2020    PCO2 88.8 12/05/2020    PO2 86.7 12/05/2020    HCO3 19.1 12/05/2020    BE -13.1 12/05/2020    THB 8.9 12/05/2020    O2SAT 88.1 12/05/2020     Labs and Imaging Studies   Basic Labs  CBC:   Lab Results   Component Value Date    WBC 51.2 12/05/2020    RBC 2.91 12/05/2020    HGB 8.7 12/05/2020    HCT 31.9 12/05/2020    .6 12/05/2020    RDW 22.9 12/05/2020     12/05/2020     CMP:  Lab Results   Component Value Date     12/05/2020    K 3.9 12/05/2020    K 3.7 12/05/2020    CL 99 12/05/2020    CO2 15 12/05/2020    BUN 29 12/05/2020    PROT 4.8 12/05/2020       Imaging Studies:     Ct Abdomen Pelvis Wo Contrast Additional Contrast? None    Result Date: 11/29/2020  EXAMINATION: CT OF THE ABDOMEN AND PELVIS WITHOUT CONTRAST 11/29/2020 2:49 pm TECHNIQUE: CT of the abdomen and pelvis was performed without the administration of intravenous contrast. Multiplanar reformatted images are provided for review. Dose modulation, iterative reconstruction, and/or weight based adjustment of the mA/kV was utilized to reduce the radiation dose to as low as reasonably achievable. COMPARISON: None. HISTORY: ORDERING SYSTEM PROVIDED HISTORY: flank pain; r/o pyelonephritis TECHNOLOGIST PROVIDED HISTORY: Additional Contrast?->None Reason for exam:->flank pain; r/o pyelonephritis FINDINGS: Lower Chest: Multifocal bilateral lower lobe pulmonary infiltrates are noted consistent with COVID-19 pneumonia. Organs:  Liver enhances normally without evidence of intrahepatic biliary ductal dilatation. The spleen is enlarged and measures 15 cm x 8 cm x 17 cm the pancreas and adrenal glands are unremarkable. There is a 8 mm nonobstructing calculus seen within the right renal pelvis. There is a 5 mm calculus seen within the midpole of the right kidney. There is no left renal calculus. There is no calculus seen within the course of the ureters. GI/Bowel: The gallbladder is unremarkable. Small bowel is normally distended. There are mild inflammatory changes surrounding the proximal sigmoid colon consistent with mild diverticulitis. There is no abscess or free air. Pelvis:  Bladder is unremarkable in appearance. There is no pelvic inflammatory process Peritoneum/Retroperitoneum:  No evidence of retroperitoneal lymphadenopathy. The abdominal aorta is normal in caliber. There is no aneurysm. Peng Odell Bones/Soft Tissues:  Age related degenerative changes of the visualized osseous structures without focal destructive lesion. Mild diverticulitis noted at the distal descending colon sigmoid colon junction. There is no abscess or free air. Splenomegaly. 8 mm nonobstructing calculus within the right renal pelvis. There is no hydronephrosis. Ct Head Wo Contrast    Result Date: 12/5/2020  EXAMINATION: CT OF THE HEAD WITHOUT CONTRAST  12/5/2020 10:51 am TECHNIQUE: CT of the head was performed without the administration of intravenous contrast. Dose modulation, iterative reconstruction, and/or weight based adjustment of the mA/kV was utilized to reduce the radiation dose to as low as reasonably achievable. COMPARISON: None. HISTORY: ORDERING SYSTEM PROVIDED HISTORY: cpr TECHNOLOGIST PROVIDED HISTORY: Reason for exam:->cpr Has a \"code stroke\" or \"stroke alert\" been called? ->No What reading provider will be dictating this exam?->CRC FINDINGS: BRAIN/VENTRICLES: There is no acute intracranial hemorrhage, mass effect or midline shift. No abnormal extra-axial fluid collection. The gray-white differentiation is maintained without evidence of an acute infarct. There is no evidence of hydrocephalus. ORBITS: The visualized portion of the orbits demonstrate no acute abnormality.  SINUSES: The visualized paranasal sinuses and mastoid air cells demonstrate no acute abnormality. SOFT TISSUES/SKULL:  No acute abnormality of the visualized skull or soft tissues. No acute intracranial abnormality. Ct Chest Wo Contrast    Result Date: 11/29/2020  EXAMINATION: CT OF THE CHEST WITHOUT CONTRAST 11/29/2020 2:49 pm TECHNIQUE: CT of the chest was performed without the administration of intravenous contrast. Multiplanar reformatted images are provided for review. Dose modulation, iterative reconstruction, and/or weight based adjustment of the mA/kV was utilized to reduce the radiation dose to as low as reasonably achievable. COMPARISON: 10/06/2020 HISTORY: ORDERING SYSTEM PROVIDED HISTORY: cough; r/o pneumonia TECHNOLOGIST PROVIDED HISTORY: Reason for exam:->cough; r/o pneumonia FINDINGS: Mediastinum:  Thyroid is grossly unremarkable within the limits of CT. No evidence of mediastinal, hilar or axillary lymphadenopathy. Heart and pericardium are unremarkable. The thoracic aorta is normal caliber. Lungs/pleura: Multifocal bilateral ground-glass and semi solid infiltrates are noted most consistent with COVID-19 pneumonia. Upper Abdomen: The abdomen was dictated separately. Soft Tissues/Bones:  Age related degenerative changes of the visualized osseous structures without focal destructive lesion. Multifocal bilateral ground-glass and semi solid pulmonary infiltrates most consistent with COVID-19 pneumonia. Ct Abdomen Pelvis W Iv Contrast Additional Contrast? None    Result Date: 12/5/2020  EXAMINATION: CT OF THE ABDOMEN AND PELVIS WITH CONTRAST 12/5/2020 10:51 am TECHNIQUE: CT of the abdomen and pelvis was performed with the administration of intravenous contrast. Multiplanar reformatted images are provided for review. Dose modulation, iterative reconstruction, and/or weight based adjustment of the mA/kV was utilized to reduce the radiation dose to as low as reasonably achievable.  COMPARISON: None HISTORY: ORDERING SYSTEM PROVIDED HISTORY: cpr possible pe TECHNOLOGIST PROVIDED HISTORY: Reason for exam:->cpr possible pe Additional Contrast?->None What reading provider will be dictating this exam?->CRC FINDINGS: Lower Chest: Heart size is felt to be top normal.  Coronary artery calcifications are present, potentially a marker for coronary artery disease. Trace pericardial fluid is present. There is a suspected partially imaged sternal fracture on axial image #1. Several mildly displaced anterolateral rib fractures are present, in keeping with the provided history of cardiopulmonary resuscitation. There are extensive ground-glass opacities throughout the lung bases. Organs: The gallbladder is normal in appearance. The liver demonstrates diffusely decreased attenuation, findings which suggest but are not diagnostic of steatosis. The portal vein is patent. There is no intrahepatic biliary dilation. The right hemidiaphragm is elevated. The spleen is enlarged, measuring 16 cm in craniocaudal dimension. No obvious focal splenic abnormality although evaluation of the mid to lower aspect of the spleen is limited secondary to respiratory motion artifact. The pancreas enhances homogeneously. The left kidney is poorly visualized due to motion artifact. No obvious abnormality of the left kidney. There is heterogeneous enhancement of the mid right kidney with several chronic appearing cortical defects involving the right kidney. High-density material within the right renal pelvis is nonspecific and may be related to a nonobstructing calculus or multiple tiny adjacent nonobstructing calculi. Blood products within the right renal pelvis are not excluded. GI/Bowel: No evidence of a bowel obstruction, free air or pneumatosis. Portions the colon are decompressed, limiting assessment for mucosal based abnormalities. There is diverticulosis without evidence of diverticulitis. The appendix is not confidently visualized.   No pericecal inflammatory changes to suggest acute appendicitis. Pelvis: The urinary bladder is partially decompressed around a well-positioned Porter catheter. No obvious bladder abnormality. The uterus is grossly unremarkable. The ovaries are not well visualized. Trace free fluid is present in the pelvis. A right femoral vein catheter extends into the right common iliac vein. Prior to entering the inferior vena cava, catheter extends posteriorly, likely within a lumbar vein. This does not appear to be extraluminal and there is no adjacent hematoma/fluid collection. Peritoneum/Retroperitoneum: The abdominal aorta is normal in caliber. An inferior vena cava filter is well positioned. No retroperitoneal lymphadenopathy. Bones/Soft Tissues: Aside from the bilateral rib fractures and suspected sternal fractures, no acute osseous abnormality or evidence of an aggressive osseous lesion. Extensive ground-glass opacification throughout the lung bases bilaterally, concerning for COVID-19 pneumonia. Multiple bilateral anterolateral rib fractures along with a suspected sternal fracture are noted, likely related to cardiopulmonary resuscitation. No acute inflammatory obstructive process in the abdomen or pelvis. Right femoral vein catheter tip extends posteriorly from the right common iliac vein and is likely within a lumbar vein. Recommend retracting the catheter 2-3 cm for positioning within the right iliac vein. Heterogeneous enhancement pattern of the mid right kidney. Pyelonephritis not excluded. Nonobstructing calculus versus blood products or multiple tiny nonobstructing calculi within the right renal pelvis. No hydronephrosis.     Xr Chest Portable    Result Date: 12/5/2020  EXAMINATION: ONE XRAY VIEW OF THE CHEST 12/5/2020 11:23 am COMPARISON: November 29, 2020 HISTORY: ORDERING SYSTEM PROVIDED HISTORY: ett TECHNOLOGIST PROVIDED HISTORY: Reason for exam:->ett What reading provider will be dictating this exam?->CRC FINDINGS: Endotracheal tube is 3.5 cm above nirmal. Airspace opacities are present throughout both lungs. Heart is normal in size. No pleural effusion. No pneumothorax. Increasing pneumonia or interstitial pulmonary edema throughout both lungs. Xr Chest Portable    Result Date: 11/29/2020  EXAMINATION: ONE XRAY VIEW OF THE CHEST 11/29/2020 10:13 am COMPARISON: None. HISTORY: ORDERING SYSTEM PROVIDED HISTORY: cough TECHNOLOGIST PROVIDED HISTORY: Reason for exam:->cough FINDINGS: Trachea is midline. Cardiomediastinal silhouette is within normal limits. There are  bilateral airspace opacities seen in the lungs. No pneumothorax. No sizable pleural effusions. Osseous structures are grossly unremarkable. Scattered bilateral airspace opacities may represent infectious process. Cta Chest W Contrast    Result Date: 12/5/2020  EXAMINATION: CTA OF THE CHEST 12/5/2020 10:51 am TECHNIQUE: CTA of the chest was performed after the administration of intravenous contrast.  Multiplanar reformatted images are provided for review. MIP images are provided for review. Dose modulation, iterative reconstruction, and/or weight based adjustment of the mA/kV was utilized to reduce the radiation dose to as low as reasonably achievable. COMPARISON: November 29, 2020 HISTORY: ORDERING SYSTEM PROVIDED HISTORY: cpr TECHNOLOGIST PROVIDED HISTORY: Reason for exam:->cpr What reading provider will be dictating this exam?->CRC FINDINGS: No filling defect in the pulmonary arteries to suggest pulmonary arterial embolism. The heart is normal size. No pericardial effusion. Multiple prominent paratracheal lymph nodes are present. Confluent ground-glass opacities are present throughout both lungs. No pneumothorax. Minimal bilateral pleural effusions. View of the upper abdomen shows normal bilateral adrenal glands. 1.  No pulmonary embolism. 2.  Diffuse ground-glass opacities related interstitial pulmonary edema or pneumonia.  3. Trace bilateral pleural effusions. Us Dup Abd Pel Retro Scrot Complete    Result Date: 11/30/2020  EXAMINATION: RETROPERITONEAL ULTRASOUND OF THE KIDNEYS AND URINARY BLADDER 11/30/2020 COMPARISON: September 4, 2019 HISTORY: ORDERING SYSTEM PROVIDED HISTORY: r/o renal thrombosis, DORYS with covid and prior clot TECHNOLOGIST PROVIDED HISTORY: Reason for exam:->r/o renal thrombosis, DORYS with covid and prior clot Reason for exam:->likely will need renal doppler What reading provider will be dictating this exam?->CRC FINDINGS: Velocities associated with right renal artery are 110 centimeters/second, 70 centimeters/second, and 44 centimeters/second. Left renal artery 220 centimeters/second, 232 centimeters/second, and 229 centimeters/second. Renal artery aortic ratio on the right is 1.1. Renal artery aortic ratio on the left is 2.4. Right kidney measures 8.3 x 5 x 4.4 cm. Left kidney measures 11.1 x 5.4 x 5.2 cm. No hydronephrosis. There is a shadowing calcification at level of right renal hilum measuring approximately 10 mm. There is mild right renal cortical thinning. Urinary bladder is not optimally distended. 1.  Elevated velocities associated with proximal, mid, and distal left renal artery suggestive of hemodynamically significant left renal artery stenosis. 2.  Calculus measuring 10 mm is present at level of right renal hilum. No hydronephrosis. 3.  Mild right renal cortical thinning. 4.  Splenomegaly.     Us Retroperitoneal José Luis    Result Date: 11/30/2020  EXAMINATION: RETROPERITONEAL ULTRASOUND OF THE KIDNEYS AND URINARY BLADDER 11/30/2020 COMPARISON: September 4, 2019 HISTORY: ORDERING SYSTEM PROVIDED HISTORY: r/o renal thrombosis, DORYS with covid and prior clot TECHNOLOGIST PROVIDED HISTORY: Reason for exam:->r/o renal thrombosis, DORYS with covid and prior clot Reason for exam:->likely will need renal doppler What reading provider will be dictating this exam?->CRC FINDINGS: Velocities associated with right renal artery are 110 centimeters/second, 70 centimeters/second, and 44 centimeters/second. Left renal artery 220 centimeters/second, 232 centimeters/second, and 229 centimeters/second. Renal artery aortic ratio on the right is 1.1. Renal artery aortic ratio on the left is 2.4. Right kidney measures 8.3 x 5 x 4.4 cm. Left kidney measures 11.1 x 5.4 x 5.2 cm. No hydronephrosis. There is a shadowing calcification at level of right renal hilum measuring approximately 10 mm. There is mild right renal cortical thinning. Urinary bladder is not optimally distended. 1.  Elevated velocities associated with proximal, mid, and distal left renal artery suggestive of hemodynamically significant left renal artery stenosis. 2.  Calculus measuring 10 mm is present at level of right renal hilum. No hydronephrosis. 3.  Mild right renal cortical thinning. 4.  Splenomegaly. Us Breast Limited Right    Result Date: 11/23/2020  INDICATION: Right Diagnostic  HISTORY: The patient has a history of Skin Cancer and bone cancer more than 10 years ago. The patient has the following family history of breast cancer:  paternal aunt, at age 79 and paternal aunt, at age [de-identified]. The patient has a history of right Excisional Biopsy more than 10 years ago - benign. MAMMOGRAM VIEWS: The following mammographic views where obtained: right craniocaudal; right craniocaudal with tomosynthesis; right mediolateral oblique; and right mediolateral oblique with tomosynthesis  ULTRASOUND TECHNIQUE: High-resolution real-time ultrasound scanning was performed. Additional elastography and doppler color flow analysis of any finding was also obtained. TOMOSYNTHESIS: Tomosynthesis (3 Dimensional Breast Imaging) was used on this examination to aid in evaluation. COMPARISON: The present examination has been compared to prior imaging studies performed on 12/27/2018, and at [address] [place] [address] on 05/14/2020.   CAD: This exam was reviewed using the R2 ImageChecker Computer Aided Detection (CAD)  TISSUE DENSITY: The breast is heterogeneously dense (Type 3 density). MAMMOGRAM FINDINGS: Finding 1: There is a both high and fat density, irregular mass seen in the right breast at 3 o'clock located 8 centimeters from the nipple. Mass has decreased in size. This finding is most consistent with fat necrosis. ULTRASOUND FINDINGS:   Finding 1: Sonography was performed in the right breast using a radial and anti-radial approach. Ultrasound shows an irregular solid mass with partially defined margins measuring 12 x 10 x 14 mm. Internal echogenicity is mixed demonstrating both hyperechoic and hypoechoic areas. IMPRESSION: Solid mass in the right breast is probably benign. Follow-up mammogram and ultrasound in 6 months is recommended.  ======================================= BI-RADS Category 3:  Probably Benign =======================================  RISK ASSESSMENT: During this patient's visit, information obtained was used to generate a lifetime risk assessment using the Tyrer-Cuzick model (also called the MAYDA [International Breast Cancer Intervention Study] Breast Cancer Risk Evaluation Tool). - LIFETIME RISK - Patient has a Tyrer Cuzick score of 10.5% - BREAST TISSUE DENSITY - Heterogeneously dense  -AVERAGE RISK (<15% ) Yearly screening mammograms starting at age 36 and older. Regardless of breast density, tomosynthesis is suggested. Monthly self-breast exams are recommended for women age 27 and older. NOTE: Mammography is not 100% accurate in the detection of breast cancer. Accuracy decreases as mammographic density of the breast increases. A negative mammogram should not deter further evaluation (including biopsy) of suspicious physical findings. Recommendations are based on NCCN ([address]) and ACR [employer] of Radiology) guidelines. Thank you for sending your patient to this ACR and FDA approved facility.  If there are any physician concerns regarding this report, a Radiologist can be reached by calling the following number [phone]. Follow up Protocol for the [facility]: BI-RADS 1 or 2:  Center will send a reminder when next mammogram is due. BI-RADS 3:  Center will send a reminder for recommended short term follow up. BI-RADS 0, 4 or 5:  Center will contact patient to schedule all additional views and procedures. [facility] Tony Digital Diagnostic Unilateral Right Per Protocol    Result Date: 11/23/2020  INDICATION: Right Diagnostic  HISTORY: The patient has a history of Skin Cancer and bone cancer more than 10 years ago. The patient has the following family history of breast cancer:  paternal aunt, at age 79 and paternal aunt, at age [de-identified]. The patient has a history of right Excisional Biopsy more than 10 years ago - benign. MAMMOGRAM VIEWS: The following mammographic views where obtained: right craniocaudal; right craniocaudal with tomosynthesis; right mediolateral oblique; and right mediolateral oblique with tomosynthesis  ULTRASOUND TECHNIQUE: High-resolution real-time ultrasound scanning was performed. Additional elastography and doppler color flow analysis of any finding was also obtained. TOMOSYNTHESIS: Tomosynthesis (3 Dimensional Breast Imaging) was used on this examination to aid in evaluation. COMPARISON: The present examination has been compared to prior imaging studies performed on 12/27/2018, and at [facility] on 05/14/2020. CAD: This exam was reviewed using the [employer] Computer Aided Detection (CAD)  TISSUE DENSITY: The breast is heterogeneously dense (Type 3 density). MAMMOGRAM FINDINGS: Finding 1: There is a both high and fat density, irregular mass seen in the right breast at 3 o'clock located 8 centimeters from the nipple. Mass has decreased in size. This finding is most consistent with fat necrosis.   ULTRASOUND FINDINGS:   Finding 1: Sonography was additional views and procedures. Us Dup Lower Extremities Bilateral Venous    Result Date: 11/30/2020  EXAMINATION: DUPLEX VENOUS ULTRASOUND OF THE BILATERAL LOWER EXTREMITIES, 11/30/2020 1:53 pm TECHNIQUE: Duplex ultrasound and Doppler images were obtained of the bilateral lower extremities COMPARISON: None. HISTORY: ORDERING SYSTEM PROVIDED HISTORY: coivd 19 prior clot TECHNOLOGIST PROVIDED HISTORY: Reason for exam:->coivd 19 prior clot What reading provider will be dictating this exam?->CRC FINDINGS: The visualized veins of the bilateral lower extremities are patent and free of echogenic thrombus. The veins are normally compressible and have normal phasic flow. No evidence of DVT in either lower extremity. Resident's Assessment and Plan     Mariana Reyes is a 77 y.o. female with PMHx of DVT (s/p IVC on Eliquis) and polycythemia vera (on Hydrea and intermittent phlebotomy) who presented due to unresponsiveness and cardiac arrest.  She was admitted in [facility] from 11/30-12/4 for COVID-19 pneumonia. On 12/5, noted shortness of breath and desaturation. Was then found unresponsive and had no CPR done for 5 to 10 minutes prior to EMS arrival.  Had a total of 2 cardiac arrest achieving ROSC both times prior to being transferred to the ED. She was then intubated, started on pressors and sent to the MICU for further management. Neurology:   Acute encephalopathy likely 2/2 cardiac arrest  Was found unresponsive at home and did not receive CPR for about 5 to 10 minutes. CT head was negative on admission.  - Cooling discontinued due to bleeding  - Currently sedated with fentanyl and propofol 12 ventilatory support  - Follow BMP every 6 hours while cooling  - Plan to start rewarming process after patient is at 35°C goal for 24 hours    Myoclonic seizures  Noted to have myoclonic jerks at the ED after 2 cardiac arrests.   - Neuro consult placed, appreciate recommendations  - Start Keppra 1000 mg times documented are independent of procedures and multidisciplinary rounds with Residents. Additionally comprehensive, multidisciplinary rounds were conducted with the MICU team. The case was discussed in detail and plans for care were established. Review of Residents documentation was conducted and revisions were made as appropriate. I agree with the above documented exam, problem list and plan of care.   Brett Palencia D.O.  CCT 46 min

## 2020-12-06 PROBLEM — R57.0 CARDIOGENIC SHOCK (HCC): Status: ACTIVE | Noted: 2020-01-01

## 2020-12-06 PROBLEM — N17.9 AKI (ACUTE KIDNEY INJURY) (HCC): Status: ACTIVE | Noted: 2020-01-01

## 2020-12-06 NOTE — PROGRESS NOTES
at bedside. All questions answered. Decision to compassionately withdraw care made.  Orders given by palliative care

## 2020-12-06 NOTE — HOME CARE
Patient referred to [facility] for skilled nursing, pt, ot. Patient was readmitted prior to home care soc. Will need ALL NEW HOME CARE ORDERS at discharge.  Hemal Hickey lpn

## 2020-12-06 NOTE — PROGRESS NOTES
[address] Dr. Atiya Fox at bedside notified of patient death     65 Dr. Demarco Trinidad notified of patient death. Will sign  death certificate.  Discharge order requested    1003 Dr Mirella Escalante notified of patient death    46 Dr. Gay Boyer with palliative notified of patient Gui Crate Dr. Aniket Leon answering service paged to notify of patient death

## 2020-12-06 NOTE — CONSULTS
Felix Ash is a 77 y.o. female       Chief Complaint   Patient presents with    Respiratory Arrest     just discharged with covid pt went unresponsive at home       HPI:    Pt presented with PEA arrest after presumed respiratory event after covid. Prolonged hypoxia and hypotension with code. Following pt was noted by have generalized myoclonic twitches. Pt is currently on paralytics and heavy sedation as well as multiple pressors. Twitching has stopped. HPI  Prior to Visit Medications    Medication Sig Taking? Authorizing Provider   allopurinol (ZYLOPRIM) 100 MG tablet Take 100 mg by mouth daily Yes Historical Provider, MD   hydroxyurea (HYDREA) 500 MG chemo capsule Take 1,000 mg by mouth 2 times daily Yes Historical Provider, MD   predniSONE (DELTASONE) 10 MG tablet Take 10 mg by mouth daily Yes Historical Provider, MD   rizatriptan (MAXALT) 10 MG tablet Take 10 mg by mouth once as needed for Migraine May repeat in 2 hours if needed Yes Historical Provider, MD   apixaban (ELIQUIS) 5 MG TABS tablet Take 5 mg by mouth 2 times daily Yes Historical Provider, MD   sertraline (ZOLOFT) 50 MG tablet Take 50 mg by mouth daily  Yes Historical Provider, MD   clonazePAM (KLONOPIN) 0.5 MG tablet Take 1 tablet by mouth 2 times daily for 30 days.   Antonio Garcia,    lactobacillus (CULTURELLE) CAPS capsule Take 1 capsule by mouth daily  Antonio Garcia,    levoFLOXacin (LEVAQUIN) 750 MG tablet Take 1 tablet by mouth daily for 2 days  Antonio Garcia DO   Cholecalciferol (VITAMIN D) 50 MCG (2000 UT) CAPS capsule Take 2,000 Units by mouth daily   Historical Provider, MD     Social History     Tobacco Use    Smoking status: Former Smoker     Packs/day: 0.50     Years: 10.00     Pack years: 5.00     Types: Cigarettes     Last attempt to quit: 2004     Years since quittin.9    Smokeless tobacco: Never Used   Substance Use Topics    Alcohol use: Yes     Comment: Socially    Drug use: No     Family History   Problem Relation Age of Onset    Cancer Father     Diabetes Mother     Thyroid Disease Sister      Past Surgical History:   Procedure Laterality Date    BREAST LUMPECTOMY      X3    COLON SURGERY      KNEE ARTHROSCOPY      Right per patient    OTHER SURGICAL HISTORY  1/28/16    control of nose bleed    OTHER SURGICAL HISTORY Right 08/25/2016    right sphenopalatine artery ligation    SHOULDER SURGERY      R rotator cuff per patient    VENA CAVA FILTER PLACEMENT N/A 12/10/2019    FEMORAL VENA CAVA FILTER INSERTION performed by Mya Sal [facility] at [facility] OR     Past Medical History:   Diagnosis Date    Anxiety     history of    Breast disorder     History of DVT (deep vein thrombosis) 12/30/2013    Polycythemia vera(238.4)     follows with Dr. Karolina Mendes monthly, treated w Hydrea & intermittent phlebotomies    PONV (postoperative nausea and vomiting)     Pulmonary embolism ([address]     history of    Recurrent epistaxis     Sweet syndrome     Venous insufficiency of leg 12/30/2013    R leg per patient     Review of Systems      Coma      Objective:   BP (!) 84/53   Pulse 114   Temp 100 °F (37.8 °C) (Bladder)   Resp 22   Ht 5' 5\" (1.651 m)   Wt 209 lb 14.1 oz (95.2 kg)   SpO2 98%   BMI 34.93 kg/m²     Exam differed as on paralytics./ Covid 19    Laboratory/Radiology:     CBC with Differential:    Lab Results   Component Value Date    WBC 48.8 12/06/2020    RBC 2.72 12/06/2020    HGB 8.1 12/06/2020    HCT 27.9 12/06/2020     12/06/2020    .6 12/06/2020    MCH 29.8 12/06/2020    MCHC 29.0 12/06/2020    RDW 23.9 12/06/2020    NRBC 24.5 12/06/2020    SEGSPCT 82 12/29/2013    BLASTSPCT 3.0 12/05/2020    METASPCT 0.9 02/29/2020    LYMPHOPCT 10.9 12/06/2020    PROMYELOPCT 3.6 12/05/2020    MONOPCT 41.8 12/06/2020    MYELOPCT 0.9 02/29/2020    BASOPCT 0.8 12/06/2020    MONOSABS 20.50 12/06/2020    LYMPHSABS 5.37 12/06/2020    EOSABS 0.00 12/06/2020    BASOSABS 0.00 12/06/2020 CMP:    Lab Results   Component Value Date     12/06/2020    K 5.2 12/06/2020    K 3.7 12/05/2020    CL 88 12/06/2020    CO2 19 12/06/2020    BUN 35 12/06/2020    CREATININE 2.9 12/06/2020    GFRAA 20 12/06/2020    LABGLOM 16 12/06/2020    GLUCOSE 154 12/06/2020    PROT 4.8 12/05/2020    LABALBU 1.6 12/05/2020    CALCIUM 6.3 12/06/2020    BILITOT 0.4 12/05/2020    ALKPHOS 162 12/05/2020     12/05/2020    ALT 53 12/05/2020     HgBA1c:    Lab Results   Component Value Date    LABA1C 5.8 12/05/2020     FLP:  No results found for: TRIG, HDL, LDLCALC, LDLDIRECT, LABVLDL    CT head:  Negative on presentation   I independently reviewed the labs and imaging studies at today's appointment. Assessment:       Concerning for anoxic brain injury. Pt is still critically ill and unstable. Any attempt to formally prognosticate is clouded by severe medical illness and instability. Plan:     Pending goals of care discussion with family. MRI and EEG once stable. Neurology will follow peripherally. Reasonable to continue xochilt.      Ana Guzman  10:48 AM  12/6/2020

## 2020-12-06 NOTE — PROGRESS NOTES
HCO3 17.9 12/06/2020    BE -8.9 12/06/2020    THB 9.4 12/06/2020    O2SAT 97.6 12/06/2020        Medications     Infusions: (Fluid, Sedation, Vasopressors)  IVF:    Bicarb drip 2050 cc/h  Vasopressors   Levophed 30, Carlos-Synephrine 200, vasopressin 0.04  Sedation   Versed 3    Nutrition:   NPO    ATB:   Antibiotics  Days   Vancomycin 2   Zosyn 2         Labs     CBC with Differential:    Lab Results   Component Value Date    WBC 48.8 12/06/2020    RBC 2.72 12/06/2020    HGB 8.1 12/06/2020    HCT 27.9 12/06/2020     12/06/2020    .6 12/06/2020    MCH 29.8 12/06/2020    MCHC 29.0 12/06/2020    RDW 23.9 12/06/2020    NRBC 24.5 12/06/2020    SEGSPCT 82 12/29/2013    BLASTSPCT 3.0 12/05/2020    METASPCT 0.9 02/29/2020    LYMPHOPCT 10.9 12/06/2020    PROMYELOPCT 3.6 12/05/2020    MONOPCT 41.8 12/06/2020    MYELOPCT 0.9 02/29/2020    BASOPCT 0.8 12/06/2020    MONOSABS 20.50 12/06/2020    LYMPHSABS 5.37 12/06/2020    EOSABS 0.00 12/06/2020    BASOSABS 0.00 12/06/2020     CMP:    Lab Results   Component Value Date     12/06/2020    K 5.2 12/06/2020    K 3.7 12/05/2020    CL 88 12/06/2020    CO2 19 12/06/2020    BUN 35 12/06/2020    CREATININE 2.9 12/06/2020    GFRAA 20 12/06/2020    LABGLOM 16 12/06/2020    GLUCOSE 154 12/06/2020    PROT 4.8 12/05/2020    LABALBU 1.6 12/05/2020    CALCIUM 6.3 12/06/2020    BILITOT 0.4 12/05/2020    ALKPHOS 162 12/05/2020     12/05/2020    ALT 53 12/05/2020       Imaging Studies:      Resident's Assessment and Plan     Reg Rolle is a 77 y.o. female with PMHx of DVT (s/p IVC on Eliquis) and polycythemia vera (on Hydrea and intermittent phlebotomy) who presented due to unresponsiveness and cardiac arrest.  She was admitted in [facility] from 11/30-12/4 for COVID-19 pneumonia. On 12/5, noted shortness of breath and desaturation.   Was then found unresponsive and had no CPR done for 5 to 10 minutes prior to EMS arrival.  Had a total of 2 cardiac arrest pH 7.2, PCO2 42.2, PO2 124, HCO3 17.9  - Continue bicarb drip  - Trend lactic acid     Gastroenterology:   Elevated LFTs likely 2/2 shock  - , ALT 53 on admission  - Trend LFTs     Hematology/ Oncology:   History of DVT s/p IVC  - On Eliquis at home, noted to be compliant        DVT ppx: PCD  GI ppx: Protonix    Final note: Patient continues to have guarded prognosis. Family wishes to continue current management. We will honor family's wishes. Palliative care consulted. Aleena Medel MD, PGY-1  Attending physician: Dr. Gómez Brumfield    I personally saw, examined and provided care for the patient. Radiographs, labs and medication list were reviewed by me independently. I spoke with bedside nursing, therapists and consultants. Critical care services and times documented are independent of procedures and multidisciplinary rounds with Residents. Additionally comprehensive, multidisciplinary rounds were conducted with the MICU team. The case was discussed in detail and plans for care were established. Review of Residents documentation was conducted and revisions were made as appropriate. I agree with the above documented exam, problem list and plan of care.   Manisha Rodríguez D.O.  CCT 40 min

## 2020-12-06 NOTE — SIGNIFICANT EVENT
Called to bedside to pronounce death after patient was noted to be in asystole at 16:40pm 12/6/2020      NO pupillary, corneal, doll's eye or gag reflex noted. NO heart sounds or pulse noted. NO Chest rise or Lung sounds noted. Time of death declared at 16:40pm 12/6/2020.       Edwin Aj MD PGY-1  12/6/2020  6:08 PM

## 2020-12-06 NOTE — H&P
[address] [facility]  History and Physical      CHIEF COMPLAINT:    Chief Complaint   Patient presents with    Respiratory Arrest     just discharged with covid pt went unresponsive at home        Patient of Silvia DO Darrell presents with:  Cardiac arrest [facility])    History of Present Illness:   Patient was recently discharged from [facility] after being treated for COVID-19. She was discharged on O2. This morning she was found to have worsening shortness of breath and worsening hypoxemia. She became unresponsive. Apparently for 5 to 10 minutes she did not receive CPR. EMS came and found her in asystole. CPR was started and ROSC was achieved. In transit, she had a second cardiac arrest.  She was intubated. She is currently in the ICU.         Past Medical History:   Diagnosis Date    Anxiety     history of    Breast disorder     History of DVT (deep vein thrombosis) 12/30/2013    Polycythemia vera(238.4)     follows with Dr. Bearden Prior monthly, treated w [employer] & intermittent phlebotomies    PONV (postoperative nausea and vomiting)     Pulmonary embolism ([facility] [address]     history of    Recurrent epistaxis     Sweet syndrome     Venous insufficiency of leg 12/30/2013    R leg per patient         Past Surgical History:   Procedure Laterality Date    BREAST LUMPECTOMY      X3    COLON SURGERY      KNEE ARTHROSCOPY      Right per patient    OTHER SURGICAL HISTORY  1/28/16    control of nose bleed    OTHER SURGICAL HISTORY Right 08/25/2016    right sphenopalatine artery ligation    SHOULDER SURGERY      R rotator cuff per patient    VENA CAVA FILTER PLACEMENT N/A 12/10/2019    FEMORAL VENA CAVA FILTER INSERTION performed by Markel Bergeron MD at [facility] OR       Medications Prior to Admission:    Medications Prior to Admission: allopurinol (ZYLOPRIM) 100 MG tablet, Take 100 mg by mouth daily  hydroxyurea (HYDREA) 500 MG chemo capsule, Take 1,000 mg by mouth 2 times daily  predniSONE (DELTASONE) 10 MG tablet, Take 10 mg by mouth daily  rizatriptan (MAXALT) 10 MG tablet, Take 10 mg by mouth once as needed for Migraine May repeat in 2 hours if needed  apixaban (ELIQUIS) 5 MG TABS tablet, Take 5 mg by mouth 2 times daily  sertraline (ZOLOFT) 50 MG tablet, Take 50 mg by mouth daily   clonazePAM (KLONOPIN) 0.5 MG tablet, Take 1 tablet by mouth 2 times daily for 30 days. lactobacillus (CULTURELLE) CAPS capsule, Take 1 capsule by mouth daily  levoFLOXacin (LEVAQUIN) 750 MG tablet, Take 1 tablet by mouth daily for 2 days  Cholecalciferol (VITAMIN D) 50 MCG (2000 UT) CAPS capsule, Take 2,000 Units by mouth daily     Note that the patient's home medications were reviewed and the above list is accurate to the best of my knowledge at the time of the exam.    Allergies:    Dilaudid [hydromorphone hcl]    Social History:    reports that she quit smoking about 16 years ago. Her smoking use included cigarettes. She has a 5.00 pack-year smoking history. She has never used smokeless tobacco. She reports current alcohol use. She reports that she does not use drugs. Family History:   family history includes Cancer in her father; Diabetes in her mother; Thyroid Disease in her sister. REVIEW OF SYSTEMS:  As above in the HPI, otherwise negative    PHYSICAL EXAM:    Vitals:  BP (!) 84/53   Pulse 111   Temp 100 °F (37.8 °C) (Bladder)   Resp 22   Ht 5' 5\" (1.651 m)   Wt 209 lb 14.1 oz (95.2 kg)   SpO2 97%   BMI 34.93 kg/m²       General appearance: Moribund appearing female on the ventilator  Eyes: Sclerae anicteric  HEENT: AT/NC, ETT in place  Neck: FROM, supple, no thyromegaly  Lymph: No cervical / supraclavicular lymphadenopathy  Lungs: Bilateral rales, cooperating with vent  CV: RRR, no MRGs, no lower extremity edema  Abdomen: Soft, non-tender; no masses or HSM, +BS  Extremities: FROM without synovitis. No clubbing or cyanosis of the hands.   Skin: no rash, induration, lesions, or ulcers  Psych: N/A  Neuro: Unresponsive to voice or physical stimulus    LABS:  All labs reviewed. Of note:  CBC with Differential:    Lab Results   Component Value Date    WBC 48.8 12/06/2020    RBC 2.72 12/06/2020    HGB 8.1 12/06/2020    HCT 27.9 12/06/2020     12/06/2020    .6 12/06/2020    MCH 29.8 12/06/2020    MCHC 29.0 12/06/2020    RDW 23.9 12/06/2020    NRBC 24.5 12/06/2020    SEGSPCT 82 12/29/2013    BLASTSPCT 3.0 12/05/2020    METASPCT 0.9 02/29/2020    LYMPHOPCT 10.9 12/06/2020    PROMYELOPCT 3.6 12/05/2020    MONOPCT 41.8 12/06/2020    MYELOPCT 0.9 02/29/2020    BASOPCT 0.8 12/06/2020    MONOSABS 20.50 12/06/2020    LYMPHSABS 5.37 12/06/2020    EOSABS 0.00 12/06/2020    BASOSABS 0.00 12/06/2020     CMP:    Lab Results   Component Value Date     12/06/2020    K 5.2 12/06/2020    K 3.7 12/05/2020    CL 88 12/06/2020    CO2 19 12/06/2020    BUN 35 12/06/2020    CREATININE 2.9 12/06/2020    GFRAA 20 12/06/2020    LABGLOM 16 12/06/2020    GLUCOSE 154 12/06/2020    PROT 4.8 12/05/2020    LABALBU 1.6 12/05/2020    CALCIUM 6.3 12/06/2020    BILITOT 0.4 12/05/2020    ALKPHOS 162 12/05/2020     12/05/2020    ALT 53 12/05/2020       Imaging:  I've personally reviewed the patient's CXR  B/l pulmonary opacities    EKG:  I've personally reviewed the patient's EKG:  Sinus tach, no acute ischemic changes    ASSESSMENT/PLAN:  Principal Problem:    Cardiac arrest ([facility] [address]  Active Problems:    COVID-19    Pneumonia due to COVID-19 virus    Lactic acidosis    Seizure-like activity (HCC)    Respiratory arrest (HCC)    Cardiogenic shock (HCC)    DORYS (acute kidney injury) ([address]  Resolved Problems:    * No resolved hospital problems.  *      Prognosis is terrible    Defer to ICU team regarding whether she needs to be cooled    Broad-spectrum antibiotics    Stress dose steroids    Vasopressors    IV fluids    She has likely suffered a anoxic brain injury    Code status: Full  Requires inpatient level of care  Laz Yan    12:08 PM  12/6/2020  Cell: [phone]

## 2020-12-06 NOTE — PROGRESS NOTES
Pharmacy Consultation Note  (Antibiotic Dosing and Monitoring)    Initial consult date: 20  Consulting physician: Dr. Ely Martinez   Drug(s): Vancomycin   Indication: Pneumonia    Ht Readings from Last 1 Encounters:   20 5' 5\" (1.651 m)       Age/Gender IBW DW  Allergy Information   77 y.o.  female  85.3 kg  Dilaudid [hydromorphone hcl]               Date  WBC BUN/sCr UOP (mL/kg/hr) Drug/Dose Time   Given Level(s)   (Time) Comments   Day 1   51.2 29/1.9 None charted Vancomycin 1250mg x 1 1306     Day 1   48.8 35/2.9 65 mL total Vancomycin 750mg x 1 <0900> Level 15.7 at 0630      (#3)            (#4)            (#5)            (#6)            Other Antibiotics/Antifungals/Antivirals Start Date Stop Date Comments   Zosyn 4.5gm                            Estimated CrCL: 31 mL/min, sCr worsening at this time      Intake/Output Summary (Last 24 hours) at 2020 0827  Last data filed at 2020 0800  Gross per 24 hour   Intake 8096.4 ml   Output 565 ml   Net 7531.4 ml       Temp max: Temp (24hrs), Av.2 °F (37.9 °C), Min:98.6 °F (37 °C), Max:101.1 °F (38.4 °C)      Cultures:    Culture Date Result    Nasal MRSA pending  Pending   Urine  Pending   Urine antigens  Pending   Respiratory   Pending   Blood culture #1  Pending   Blood culture #2  Pending       Assessment:  · Consulted by Dr. Ely Martinez to dose/monitor vancomycin  · Goal trough level:  15-20 mcg/mL  · DORYS worsening with sCr up to 2.9 and minimal urine output. Remains on three vasopressor medications to maintain MAP    Plan:  · Continue to dose vancomycin by levels at this time  · Random level ~18h post dose = 15.7. Will give 750mg x 1 this AM and repeat random level tomorrow AM.  · Pharmacist will follow and monitor/adjust dosing as necessary    Thank you for this consult, please page with questions.     Keisha Han, PharmD, [address] [facility] 2020 8:27 AM  Pharmacy Clinical Specialist, Emergency Medicine  [phone]

## 2020-12-06 NOTE — CONSULTS
Palliative Care Department  [phone]  Palliative Care Initial Consult  Provider Barrington Strong MD      PATIENT: Margaret Baltazar  : 1955  MRN: [id number]  ADMISSION DATE: 2020 10:18 AM  Referring Provider: Molina Gallagher MD    Palliative Medicine was consulted on hospital day 1 for assistance with Goals of care    HPI:     Ajay Manjarrez is a 77 y.o. female with PMHx of DVT (s/p IVC on Eliquis) and polycythemia vera (on Hydrea and intermittent phlebotomy) who presented due to unresponsiveness and cardiac arrest.  She was admitted in [facility] from - for COVID-19 pneumonia.  On , noted shortness of breath and desaturation.  Was then found unresponsive and had no CPR done for 5 to 10 minutes prior to EMS arrival. [facility] a total of 2 cardiac arrest achieving ROSC both times prior to being transferred to the ED. Megan Travis was then intubated, started on pressors and sent to the MICU for further management    ASSESSMENT/PLAN:     Pertinent Hospital Diagnoses      COVID-19   Anoxic brain injury      Palliative Care Encounter / Counseling Regarding Goals of Care  Please see detailed goals of care discussion as below   At this time, Margaret Baltazar, Does Not have capacity for medical decision-making. Capacity is time limited and situation/question specific   Outcome of goals of care meeting: Spoke to father, he did not want to make any decisions for her told me to call daughter. I spoke to Sarah Lopes, told her about Angi's condition. She wants to convert her to DNR CCA, she wants her dad to visit her mom in the ICU. Then they plan on doing a terminal extubation.     Code status DNR-CCA   Advanced Directives: no POA or living will in epic   Surrogate/Legal NOK: William Dawson, [phone], Opal Cook, [phone]      Spiritual assessment: no spiritual distress identified  Bereavement and grief: to be determined  Referrals to: none today    Thank you for the opportunity to participate in the care of Babar Santos. Hetal Capellan MD  Palliative Medicine     SUBJECTIVE:       Details of Conversation: Spoke to  Ramsey Alas, he told me to give [facility] a call who is his daughter. Spoke to her, explained the situation and how her mother is currently on 3 pressors. And she has anoxic brain injury. I explained to her that she is going to die. She changed her CODE STATUS to a DNR CCA at the moment. She is expecting her father to come visit Pearlie Goldberg, after he visits her they would like to do a terminal extubation and change her CODE STATUS. Prognosis: Poor    OBJECTIVE:     BP (!) 84/53   Pulse 111   Temp 100 °F (37.8 °C) (Bladder)   Resp 22   Ht 5' 5\" (1.651 m)   Wt 209 lb 14.1 oz (95.2 kg)   SpO2 97%   BMI 34.93 kg/m²     Physical Examination:  Due to the current efforts to prevent transmission of COVID-19 and also the need to preserve PPE for other caregivers, a face-to-face encounter with the patient was not performed. That being said, all relevant records and diagnostic tests were reviewed, including laboratory results and imaging. Please reference any relevant documentation elsewhere. Care will be coordinated with the primary service and other specialties as appropriate. Objective data reviewed: labs, images, records, medication use, vitals and chart    Time/Communication  Greater than 50% of time spent, total 35 minutes in counseling and coordination of care at the bedside regarding goals of care. Thank you for allowing Palliative Medicine to participate in the care of Babar Santos. Note: This report was completed using [employer] voiced recognition software. Every effort has been made to ensure accuracy; however, inadvertent computerized transcription errors may be present.

## 2020-12-06 NOTE — PROGRESS NOTES
Progress Note    Subjective:  Patient seen and examined. Nurse in the room taking care of patient. She had cardiac arrest at home likely secondary to respiratory failure. Recent admission for Covid pneumonia. Now patient with cardiogenic shock, renal failure, respiratory failure, seizure with myoclonic jerks, bleeding from line sites, thromboembolic events to the hands and feet with decrease pulse left foot and mottling of the hands and feet. Objective:    BP (!) 84/53   Pulse 105   Temp 99.3 °F (37.4 °C) (Bladder)   Resp 22   Ht 5' 5\" (1.651 m)   Wt 209 lb 14.1 oz (95.2 kg)   SpO2 99%   BMI 34.93 kg/m²     General: Critically ill woman intubated, sedated and paralyzed. HEENT: Anicteric sclera, ET tube in place, NG tube with blood  Heart:  RRR, no murmurs, gallops, or rubs. Lungs: Decreased breath sounds  Abd: bowel sounds present, nontender, nondistended, no masses  Extrem: All extremities edematous, bleeding from right arterial line, right femoral line site intact, absent pulse left foot, mottling of all extremities. CBC:   Lab Results   Component Value Date    WBC 48.8 12/06/2020    RBC 2.72 12/06/2020    HGB 8.1 12/06/2020    HCT 27.9 12/06/2020    .6 12/06/2020    MCH 29.8 12/06/2020    MCHC 29.0 12/06/2020    RDW 23.9 12/06/2020     12/06/2020    MPV 12.6 12/06/2020     CMP:    Lab Results   Component Value Date     12/06/2020    K 5.2 12/06/2020    K 3.7 12/05/2020    CL 88 12/06/2020    CO2 19 12/06/2020    BUN 35 12/06/2020    CREATININE 2.9 12/06/2020    GFRAA 20 12/06/2020    LABGLOM 16 12/06/2020    GLUCOSE 154 12/06/2020    PROT 4.8 12/05/2020    LABALBU 1.6 12/05/2020    CALCIUM 6.3 12/06/2020    BILITOT 0.4 12/05/2020    ALKPHOS 162 12/05/2020     12/05/2020    ALT 53 12/05/2020            XR CHEST PORTABLE   Final Result   Supportive tubing, including addition of nasogastric tube, project in normal   positions.   Advancement of nasogastric tube by 5 cm is suggested, to ensure   the distal side port is entirely intragastric. Bilateral inhomogeneous alveolar opacities, with mild improvement. XR CHEST PORTABLE   Final Result   Increasing pneumonia or interstitial pulmonary edema throughout both lungs. CTA CHEST W CONTRAST   Final Result   1. No pulmonary embolism. 2.  Diffuse ground-glass opacities related interstitial pulmonary edema or   pneumonia. 3.  Trace bilateral pleural effusions. CT HEAD WO CONTRAST   Final Result   No acute intracranial abnormality. CT ABDOMEN PELVIS W IV CONTRAST Additional Contrast? None   Final Result   Extensive ground-glass opacification throughout the lung bases bilaterally,   concerning for COVID-19 pneumonia. Multiple bilateral anterolateral rib   fractures along with a suspected sternal fracture are noted, likely related   to cardiopulmonary resuscitation. No acute inflammatory obstructive process in the abdomen or pelvis. Right femoral vein catheter tip extends posteriorly from the right common   iliac vein and is likely within a lumbar vein. Recommend retracting the   catheter 2-3 cm for positioning within the right iliac vein. Heterogeneous enhancement pattern of the mid right kidney. Pyelonephritis   not excluded. Nonobstructing calculus versus blood products or multiple tiny nonobstructing   calculi within the right renal pelvis. No hydronephrosis.       XR CHEST PORTABLE    (Results Pending)   XR CHEST PORTABLE    (Results Pending)         Current Facility-Administered Medications:     0.9 % sodium chloride bolus, 20 mL, Intravenous, Once, Javier Rivera., DO    0.9 % sodium chloride bolus, 20 mL, Intravenous, Once, Javier Rivera., DO    PHENobarbital (LUMINAL) injection 65 mg, 65 mg, Intravenous, 3 times per day, Javier Arita, DO, 65 mg at 12/06/20 0526    cisatracurium besylate (NIMBEX) 200 mg in sodium chloride 0.9 % 100 mL infusion, 2 mcg/kg/min, Intravenous, Continuous, Cardwell Flores., DO, Last Rate: 5.7 mL/hr at 12/06/20 0809, 2 mcg/kg/min at 12/06/20 0809    norepinephrine (LEVOPHED) 16 mg in dextrose 5% 250 mL infusion, 5 mcg/min, Intravenous, Continuous, Alecia Worrell, DO, Last Rate: 15 mL/hr at 12/06/20 1217, 16 mcg/min at 12/06/20 1217    sodium chloride flush 0.9 % injection 10 mL, 10 mL, Intravenous, 2 times per day, Cardwell Flores., DO, 10 mL at 12/06/20 0354    sodium chloride flush 0.9 % injection 10 mL, 10 mL, Intravenous, PRN, Cardwell Flores., DO    acetaminophen (TYLENOL) tablet 650 mg, 650 mg, Oral, Q6H PRN **OR** acetaminophen (TYLENOL) suppository 650 mg, 650 mg, Rectal, Q6H PRN, Cardwell Flores., DO, 650 mg at 12/05/20 2031    polyethylene glycol (GLYCOLAX) packet 17 g, 17 g, Oral, Daily PRN, Cardwell Flores., DO    insulin lispro (HUMALOG) injection vial 0-6 Units, 0-6 Units, Subcutaneous, Q6H, Cardwell Flores., DO, 3 Units at 12/06/20 1205    perflutren lipid microspheres (DEFINITY) injection 1.65 mg, 1.5 mL, Intravenous, ONCE PRN, Cardwell Flores., DO    acetaminophen (TYLENOL) 160 MG/5ML solution 650 mg, 650 mg, Oral, Q6H PRN, Martín Gilmore., DO, 650 mg at 12/06/20 5923    senna (SENOKOT) tablet 8.6 mg, 1 tablet, Oral, Daily PRN, Cardwell Flores., DO    pantoprazole (PROTONIX) injection 40 mg, 40 mg, Intravenous, Daily, 40 mg at 12/06/20 0852 **AND** sodium chloride (PF) 0.9 % injection 10 mL, 10 mL, Intravenous, Daily, Cardwell Flores., DO, 10 mL at 12/06/20 0852    promethazine (PHENERGAN) tablet 12.5 mg, 12.5 mg, Oral, Q6H PRN **OR** ondansetron (ZOFRAN) injection 4 mg, 4 mg, Intravenous, Q6H PRN, Cardwell Flores., DO    chlorhexidine (PERIDEX) 0.12 % solution 15 mL, 15 mL, Mouth/Throat, BID, Cardwell Flores., DO, 15 mL at 12/06/20 2472    [DISCONTINUED] polyvinyl alcohol (LIQUIFILM TEARS) 1.4 % ophthalmic solution 1 drop, 1 drop, Both Eyes, Q4H, 1 drop at 12/06/20 0855 **AND** IVPB extended infusion (mini-bag), 3.375 g, Intravenous, Q8H, Everlene Na Karlynn Cough., DO, Last Rate: 25 mL/hr at 12/06/20 1205, 3.375 g at 12/06/20 1205    midazolam (VERSED) 100 mg in dextrose 5 % 100 mL infusion, 1 mg/hr, Intravenous, Continuous, Everlene Na Karlynn Cough., DO, Last Rate: 3 mL/hr at 12/06/20 8998, 3 mg/hr at 12/06/20 3432    Assessment:    Principal Problem:    Cardiac arrest [facility])  Active Problems:    Pneumonia due to COVID-19 virus    Lactic acidosis    Seizure-like activity ([facility] [address]    Respiratory arrest ([facility] [address]  Resolved Problems:    * No resolved hospital problems. *      75-year-old woman who I follow for polycythemia vera and managed with hydroxyurea to manage her platelets as well as occasional phlebotomy for hemoglobin, prior pulmonary embolism and DVT, prior GI bleed, sweet syndrome with recent admission for Covid pneumonia November 30 3 December 4 admitted with respiratory failure and PEA cardiac arrest.  Seizure-like activity noted. Patient currently intubated and on paralytics. She is in multiorgan failure and prognosis extremely poor. Plan:  White blood cell count likely leukemoid as well as infectious etiology. Platelet count currently well controlled. Hemoglobin decreased may be from consumptive process such as DIC versus GI bleeding. She has evidence of bleeding in anything from her venipuncture sites as well as in her NG tube. -DIC panel to be ordered.  -Prognosis extremely poor. -Management as per primary team.  -Transfuse PRBC if hemoglobin less than 7.         Electronically signed by Austin Wright MD on 12/6/2020 at 12:21 PM

## 2020-12-06 NOTE — PLAN OF CARE
Problem: Gas Exchange - Impaired  Goal: Promote optimal lung function  Outcome: Met This Shift     Problem: Isolation Precautions - Risk of Spread of Infection  Goal: Prevent transmission of infection  Outcome: Met This Shift     Problem: Skin Integrity:  Goal: Will show no infection signs and symptoms  Description: Will show no infection signs and symptoms  Outcome: Met This Shift  Goal: Absence of new skin breakdown  Description: Absence of new skin breakdown  Outcome: Met This Shift     Problem: Falls - Risk of:  Goal: Will remain free from falls  Description: Will remain free from falls  Outcome: Met This Shift  Goal: Absence of physical injury  Description: Absence of physical injury  Outcome: Met This Shift     Problem: Airway Clearance - Ineffective  Goal: Achieve or maintain patent airway  Outcome: Not Met This Shift     Problem: Gas Exchange - Impaired  Goal: Absence of hypoxia  Outcome: Not Met This Shift     Problem: Breathing Pattern - Ineffective  Goal: Ability to achieve and maintain a regular respiratory rate  Outcome: Not Met This Shift     Problem:  Body Temperature -  Risk of, Imbalanced  Goal: Ability to maintain a body temperature within defined limits  Outcome: Not Met This Shift  Goal: Will regain or maintain usual level of consciousness  Outcome: Not Met This Shift  Goal: Complications related to the disease process, condition or treatment will be avoided or minimized  Outcome: Not Met This Shift     Problem: Nutrition Deficits  Goal: Optimize nutrtional status  Outcome: Not Met This Shift

## 2020-12-06 NOTE — DISCHARGE SUMMARY
Physician Discharge Summary     Patient ID:  Chi Dougherty  [id number]  77 y.o.  1955    Admit date: 2020    Discharge date and time:  2020     Admission Diagnoses:   Chief Complaint   Patient presents with    Respiratory Arrest     just discharged with covid pt went unresponsive at home      Cardiac arrest [facility])     Discharge Diagnoses:   Principal Problem:    Cardiac arrest [facility])  Active Problems:    COVID-19    Pneumonia due to COVID-19 virus    Lactic acidosis    Seizure-like activity ([facility] [address]    Respiratory arrest ([facility] [address]    Cardiogenic shock ([facility] [address]    DORYS (acute kidney injury) ([facility] [address]  Resolved Problems:    * No resolved hospital problems. *       Consults: pulm/ccm, neurology, palliative care    Procedures: ICU procedures    Hospital Course:   Patient recently was treated for COVID-19 pneumonia. She was discharged on relatively small amount of supplemental oxygen. Unfortunately it sounds like she developed worsening hypoxic respiratory failure at home which ultimately resulted in cardiac arrest.  She had a prolonged period without CPR, and EMS found her in cardiac arrest.  ROSC was achieved in the field though she arrested again on route, and was revived a second time. She was brought to the ICU. Ultimately the prognosis appeared extremely poor and her family decided to make her comfortable.      Discharge Exam:        Condition:      Disposition: NA    Patient Instructions:   N/A    Activity: N/A  Diet: N/A    Note that over 30 minutes was spent in preparing discharge papers, discussing discharge with patient, medication review, etc.    Signed:  Rebecca Vela    2020  5:37 PM

## 2020-12-06 NOTE — PROCEDURES
Arterial line placement    Procedure: Left/Right radial arterial line placement. Indications: Continuous monitoring of blood pressure in a patient with hypotension +/- shock, on Levophed. Anesthesia: Local infiltration of 1% lidocaine. Consent:  Unable to be obtained due to the emergent nature of this procedure. Technique: Time Out: Immediately prior to the procedure a \"timeout\" was called to verify the correct patient and procedure. Procedure was done using strict aseptic technique. Justin's test was performed and was normal. right radial site was cleaned with chloraprep and draped. Radial artery was identified, then Lidocaine 1% was infiltrated locally. Radial arterial line was inserted, a good blood flow was obtained, after which guidewire was inserted all the way with no resistance. Then the canula was inserted and needle with guidewire was withdrawn. Pulsatile bright red blood flow was observed. The canula was connected to BP monitoring apparatus and a good quality waveform was noted. Then the canula was secured with 2 stay sutures of 3-0 silk after Lidocaine infiltration, following which dressing was applied. Number of sticks: 1. Number of Kits used: 1. Complications: No immediate complication. Estimated blood loss: About 1 ml. Comment: Patient tolerated the procedure well.      Betsy Kitchen DO PGY-3  12/5/2020 10:17 PM

## 2020-12-07 LAB
Lab: NORMAL
MRSA CULTURE ONLY: NORMAL
PATHOLOGIST REVIEW: NORMAL
REPORT: NORMAL
THIS TEST SENT TO: NORMAL
URINE CULTURE, ROUTINE: NORMAL

## 2020-12-08 NOTE — ADT AUTH CERT
Patient Demographics     Name  Patient ID  SSN  Gender Identity  Birth Date    Saravanan Cardoza  [id number]    Female  51 [facility])    Address  Phone  Email  Employer     [employer] [employer]  (U)   [phone] (H)  [email or web address]  [place]  Occupation  Emp Status     [address]  White  --  Retired     Reg Status  PCP  Date Last Verified  Next Review Date     Verified  Morgan Cardoso  [phone]  20     Admission Date  Discharge Date  Admitting Provider      20  Dinh Yu DO      Marital Status  Jew         Addisoneulaliojameson Dominguez       Emergency Contact 1  Emergency Contact [facility] Julio [facility] [phone] [address]   [phone] (V)   [phone] (P) [phone] Missy Ballard)  Anayeli Dietz (Y)   [phone] (S)   [phone] Missy Ballard)    Subscriber Details   Hospital Account [de-identified]   CVG  Subscriber Name/Sex/Relation  Subscriber   Subscriber Address/Phone  Subscriber Emp/Emp Phone    1. [facility]   [id number]  [facility] - Male   (Spouse)  1956  [address] [place] [address]  [phone](U)     2.  MEDICARE   [id number]  [facility] - Female   (Self)  1955  [address] [id number] [phone](I)  RETIRED    Utilization Reviews         Pneumonia - Care Day 5 (12/3/2020) by Khushbu Wilks RN         Review Status  Review Entered    Completed  2020 08:32        Criteria Review       Care Day: 5 Care Date: 12/3/2020 Level of Care: Intermediate Care    Guideline Day 2    Level Of Care    ( ) Floor    2020 8:32 AM EST by Adriana mensah covid iso unit    Clinical Status    (X) * No CO2 retention or acidosis    2020 8:32 AM EST by Adriana Hernandez      32-co2    (X) * No requirement for mechanical ventilation    2020 8:32 AM EST by Adriana Hernandez      no vent    (X) * Hypotension absent    2020 8:32 AM EST by Heber Billingsley      97.4 (36.3)   16   79   106/55Abnormal    (X) * Afebrile or fever improved    12/8/2020 8:32 AM EST by Heber Billingsley      97.4    ( ) * No hypoxia on room air or oxygenation improved    12/8/2020 8:32 AM EST by Heber Billingsley      3L - 96%    (X) * Mental status improved or at baseline    12/8/2020 8:32 AM EST by Heber Billingsley      alert    Activity    (X) * Increased activity    12/8/2020 8:32 AM EST by Eric Sawant as philip    Routes    (X) Oral hydration, medications    12/8/2020 8:32 AM EST by Heber Billingsley      NS 75,remdesivir 100 iv qd,decadron 6 po bid,  vit d po qd, zolof po qd ,lopressor 25 po bid, culturelle po qd, levaquin po qod, guaifenesin 400 po tid,    (X) Usual diet    12/8/2020 8:32 AM EST by Eric Sawant gen    Interventions    ( ) Incentive spirometry    12/8/2020 8:32 AM EST by Eric Sawant not noted    (X) Pulse oximetry    12/8/2020 8:32 AM EST by Eric Sawant with vs and prn    ( ) Head of bed at 30 degrees    12/8/2020 8:32 AM EST by Eric Sawant prone as able    (X) Possible oxygen    12/8/2020 8:32 AM EST by Heber Billingsley      3LNC    Medications    (X) IV or oral antibiotics    12/8/2020 8:32 AM EST by Heber Billingsley      remdesivir 100 iv qd, levaquin 750 po qod,    * Milestone    Additional Notes    12/3     Intermediate covid iso unit    + Covid-19    Droplet plus isolation       Stopped heparin iv gtt and Bicarb iv gtt    Cont remdesivir 100 iv qd, decdron 6 po bid, levaquin po qod       On 3L NC    Wbc-5.3, hgb-8.6, YWK-27,391  , bun/cr-48/1.7, plt-479          PCP-    Can not sleep    Muscle pain continues    On 4 liters of O2 but clinically improved     Lungs:  CTA bilaterally, no wheeze, rales or rhonchi    busy time    Kidney function improved         DORYS    Resolving    Concerned with increased BNP    Check CXR today    May need fluids decreased and or lasix         Myelodysplasia    Watch plt and HB          ID- ASSESSMENT:    · SARS-CoV-2 infection with viral pneumonia    · Probable superimposed bacterial pneumonia right lower lobe    · Myeloproliferative disorder    · Lymphopenia    · Immunocompromised host    · CKD         PLAN:    · Continue Remdesivir, day 4    · Continue Levofloxacin, day 4 of 7    · Anticoagulation    ·       Hematology-    Plan:    Monitor CBC daily    Patient will need to stay on hydrea regardless of hemoglobin count, maintaining good control of platelet count to help decrease her risk of thrombotic events.     Continue IVF    D/C eliquis and started heparin gtt, watching renal function closely      She does have an elevated monoclonal protein, she will need bone marrow biopsy outpatient    Full supportive care        Pneumonia - Care Day 4 (12/2/2020) by Jessica Ku RN         Review Status  Review Entered    Completed  12/8/2020 08:15        Criteria Review       Care Day: 4 Care Date: 12/2/2020 Level of Care: Intermediate Care    Guideline Day 2    Level Of Care    ( ) Floor    12/8/2020 8:15 AM EST by Christopher Peace covid isolation unit- intermediate    Clinical Status    (X) * No CO2 retention or acidosis    12/8/2020 8:15 AM EST by Gigi Enriquez      co2-23    (X) * No requirement for mechanical ventilation    12/8/2020 8:15 AM EST by Gigi Enriquez      no vent    (X) * Hypotension absent    12/8/2020 8:15 AM EST by Gigi Enriquez      106/55    (X) * Afebrile or fever improved    12/8/2020 8:15 AM EST by Gigi Enriquez      97.8    ( ) * No hypoxia on room air or oxygenation improved    12/8/2020 8:15 AM EST by Gigi Enriquez      92%  2L NC    (X) * Mental status improved or at baseline    12/8/2020 8:15 AM EST by Gigi Enriquez      alert    Activity    (X) * Increased activity    12/8/2020 8:15 AM EST by Christopher Peace as philip    Routes    (X) Oral hydration, medications    12/8/2020 8:15 AM EST by Gigi Enriquez      heparin iv gtt, na bicarb iv gtt,remdesivir 100 iv q 24, decadron 6mg po bid, guaifenesin 400 po tid,hydrea 1gm po qd, culturelle po qd,levaquin 750 po qod, lopressor 25 po bid,zoloft 5 po qd, vit d po qd, NS 75, prn norco q6(x4), prn robitussin(x1)    (X) Usual diet    12/8/2020 8:15 AM EST by Jan Leal      gen    Interventions    ( ) Incentive spirometry    12/8/2020 8:15 AM EST by Jan Leal      not noted    (X) Pulse oximetry    12/8/2020 8:15 AM EST by Maik Urrutia with vs and prn    ( ) Head of bed at 30 degrees    12/8/2020 8:15 AM EST by Jan Leal      prone as able    (X) Possible oxygen    12/8/2020 8:15 AM EST by Jan Leal      2L nc    Medications    (X) IV or oral antibiotics    12/8/2020 8:15 AM EST by Jan Leal      po levaquin qod, IV remdesivir 100 qd    * Milestone    Additional Notes    12/2    Intermediate covid iso unit    +COVID-19    Droplet plus isolation       97.8 (36.6)   16   75   139/68      92% on 2L NC       Wbc-8.3, hgb-8.6, bun/cr-63/2.0, crp=7.2    Remains on IV Heparin drip, IV bicarb drip, IV remdesivir qd, po decadron bid, po levaquin qod       ID-    ASSESSMENT:    · SARS-CoV-2 infection with viral pneumonia    · Probable superimposed bacterial pneumonia right lower lobe    · Myeloproliferative disorder    · Lymphopenia    · Immunocompromised host    · CKD    PLAN:    · Continue Remdesivir, day 3    · Continue Levofloxacin, day 3 of 7    · Anticoagulation    · Steroids          Renal-    12/2: Status quo.  Feeling better.  Ongoing fatigue, generalized weakness, poor exercise tolerance.  Continued poor intake.     2.  Chronic kidney disease stage IIIb.  Baseline creatinine 1.3 to 1.4.         3.  Metabolic acidosis due to #1 and #2.         4.  COVID 19 infection with pneumonia.         5.  History of bilateral DVTs.  History of PEs.  She is chronically on Eliquis.         6.  History of myeloproliferative disorder.  She receives hydroxyurea 500 mg daily b.i.d.         Renal function improving Nonoliguric    Acidosis improving    On heparin drip       RECOMMENDATIONS:    1.  Continue IV fluid at conservative rate, though will change to NS    2.  Follow labs, UO

## 2020-12-10 LAB
BLOOD CULTURE, ROUTINE: NORMAL
CULTURE, BLOOD 2: NORMAL
T4 FREE: 1.27 NG/DL (ref 0.93–1.7)

## 2020-12-12 LAB
CORTISOL TOTAL: 41.08 MCG/DL (ref 2.68–18.4)
CORTISOL TOTAL: 66.9 MCG/DL (ref 2.68–18.4)

## 2020-12-19 NOTE — DISCHARGE SUMMARY
Patient ID:  Felix Ash  [id number]  77 y.o.  1955    Admit date: 11/29/2020    Discharge date and time: 12/4/2020  5:43 PM     Admission Diagnoses: Suspected COVID-19 virus infection [Z20.828]  Suspected COVID-19 virus infection [Z20.828]    Discharge Diagnoses: Active Problems:    COVID-19  Resolved Problems:    * No resolved hospital problems. *        Consults: ID and nephrology    Procedures: none    Hospital Course: Patient admitted with increasing weakness and shortness of breath. She had been diagnosed with covid several days earlier. She stopped eating and drinking ant home and became weak. She was admitted with renal failure. She was given IVF and seen by nephrology that felt this was all pre renal. She was seen by ID whom elected to started decadron and Remdisivir. She improved. She required home o2 and discharged on it in fair condition. Discharge Exam:  See progress note from today    Disposition: home    Patient Instructions:   Discharge Medication List as of 12/4/2020 12:19 PM      START taking these medications    Details   clonazePAM (KLONOPIN) 0.5 MG tablet Take 1 tablet by mouth 2 times daily for 30 days. , Disp-60 tablet,R-0Phone In      lactobacillus (CULTURELLE) CAPS capsule Take 1 capsule by mouth daily, Disp-30 capsule,R-0Normal      levoFLOXacin (LEVAQUIN) 750 MG tablet Take 1 tablet by mouth daily for 2 days, Disp-2 tablet,R-0Normal         CONTINUE these medications which have NOT CHANGED    Details   apixaban (ELIQUIS) 5 MG TABS tablet Take 5 mg by mouth 2 times dailyHistorical Med      hydroxyurea (HYDREA) 500 MG chemo capsule Take 4 capsules by mouth daily, Disp-120 capsule, R-0Normal      predniSONE (DELTASONE) 10 MG tablet Take 1 tablet by mouth daily Resume 10 mg daily once medrol dose pack completed, Disp-30 tablet, R-0Normal      Cholecalciferol (VITAMIN D) 50 MCG (2000 UT) CAPS capsule Take 2,000 Units by mouth daily Historical Med      Probiotic Product (PROBIOTIC Spoke with Dr. Osuna DAILY PO) Take by mouth daily Indications: 30 billion      sertraline (ZOLOFT) 50 MG tablet Take 25 mg by mouth every evening          STOP taking these medications       guaiFENesin (MUCINEX) 600 MG extended release tablet Comments:   Reason for Stopping:         azelastine (ASTELIN) 0.1 % nasal spray Comments:   Reason for Stopping:         aspirin 81 MG chewable tablet Comments:   Reason for Stopping:         warfarin (COUMADIN) 5 MG tablet Comments:   Reason for Stopping:         BIOTIN PO Comments:   Reason for Stopping:             Activity: activity as tolerated  Diet: regular diet    Follow-up with becky in 1 week.     Note that over 30 minutes was spent in preparing discharge papers, discussing discharge with patient, medication review, etc.    Signed:  Lebron Bergman  12/19/2020  8:36 AM

## 2024-04-09 NOTE — PROGRESS NOTES
[address] [place] [address] [facility]  RASHAUNIDA  Progress Note    CC: feeling much better today    Face to face encounter   SUBJECTIVE:  No new complaints. She is feeling better. She would like to go home. She still has a tender, raised red area over the right biceps. Review of systems: As above, otherwise unremarkable. Medications:  Scheduled Meds:   methylPREDNISolone  40 mg Intravenous Daily    aspirin  81 mg Oral Daily    hydroxyurea  2,000 mg Oral Daily    sodium chloride flush  10 mL Intravenous 2 times per day    vitamin D  2,000 Units Oral Daily    vitamin D  50,000 Units Oral Once per day on Mon Thu    warfarin  5 mg Oral Daily    lactobacillus  1 capsule Oral Daily    sertraline  25 mg Oral Nightly     Continuous Infusions:    PRN Meds:HYDROcodone 5 mg - acetaminophen, sodium chloride flush, acetaminophen, ondansetron  OBJECTIVE:  Patient Vitals for the past 24 hrs:   BP Temp Temp src Pulse Resp SpO2 Weight   02/28/20 0918 108/61 97.7 °F (36.5 °C) Axillary 101 16 95 % --   02/28/20 0211 -- -- -- -- -- -- 182 lb 8 oz (82.8 kg)   02/27/20 2309 (!) 142/79 98.3 °F (36.8 °C) Oral 83 18 96 % --   02/27/20 1514 133/69 98.5 °F (36.9 °C) Oral 94 18 91 % --     Constitutional: The patient is awake, alert, and oriented. Sitting in chair. Skin: Warm and dry. No rashes were noted. Head: Eyes show round, and reactive pupils. No jaundice. Mouth: Moist mucous membranes, no ulcerations, no thrush. Neck: Supple. Chest: No use of accessory muscles to breathe. Symmetrical expansion. No crackles. Cardiovascular: Heart sounds rhythmic and regular. Abdomen: Soft and benign to palpation. Positive bowel sounds. Extremities: Palpable cord to right antecubital.  Proximal to this is a oblong, raised, erythematous patch.   PIV    Laboratory and Tests Review:  Lab Results   Component Value Date    WBC 25.2 (H) 02/28/2020    WBC 25.0 (H) 02/27/2020    WBC 22.8 (H) 02/26/2020    HGB 11.1 (L) 02/28/2020 HCT 37.1 02/28/2020    .2 (H) 02/28/2020    PLT 1,241 (H) 02/28/2020     Lab Results   Component Value Date    NEUTROABS 19.40 (H) 02/28/2020    NEUTROABS 21.00 (H) 02/27/2020    NEUTROABS 16.87 (H) 02/26/2020     Lab Results   Component Value Date    CRP 21.8 (H) 02/22/2020    CRP 0.4 02/12/2016     Lab Results   Component Value Date    SEDRATE 93 (H) 02/22/2020    SEDRATE 5 02/12/2016     Lab Results   Component Value Date    ALT <5 02/26/2020    AST 17 02/26/2020    ALKPHOS 101 02/26/2020    BILITOT <0.2 02/26/2020     Lab Results   Component Value Date     02/26/2020    K 3.8 02/26/2020    K 4.5 12/09/2019    CL 96 02/26/2020    CO2 25 02/26/2020    BUN 16 02/26/2020    CREATININE 1.4 02/26/2020    GFRAA 45 02/26/2020    LABGLOM 38 02/26/2020    GLUCOSE 86 02/26/2020    PROT 8.1 02/26/2020    LABALBU 3.5 02/26/2020    CALCIUM 9.3 02/26/2020    BILITOT <0.2 02/26/2020    ALKPHOS 101 02/26/2020    AST 17 02/26/2020    ALT <5 02/26/2020     Radiology:  Reviewed     Microbiology:   Respiratory panel: Negative    ASSESSMENT:  · Fevers. Etiology not established. They have resolved. This could have been a viral infection, not detected on a respiratory panel. Cultures were negative. · Leukocytosis with thrombocytosis associated to myeloproliferative disorder. On Hydrea. WBC coming down. · History of Sweet syndrome, on low-dose prednisone. Retrospectively, she may have had an exacerbation.   The patch of erythema over the right biceps does not look like thrombophlebitis but rather neutrophilic dermatosis  · History of polycythemia vera  · Diarrhea associated to the above, resolving    PLAN:  · Continue to observe off antibiotics    Dorma [place]  12:19 PM  2/28/2020 Patient unable to complete

## 2025-03-20 NOTE — PROGRESS NOTES
[address] [place] [address] [facility]  RASHAUNIDA  Progress Note    SUBJECTIVE:  Chief Complaint   Patient presents with    Fever    Flank Pain     pt states she thinks she has a UTI    Fatigue     No new problems reported. Review of systems:  As stated above in the chief complaint, otherwise negative. Medications:  Scheduled Meds:   apixaban  5 mg Oral BID    dexamethasone  6 mg Oral 2 times per day    vitamin D  2,000 Units Oral Daily    sertraline  25 mg Oral QPM    guaiFENesin  400 mg Oral TID    lactobacillus  1 capsule Oral Daily    metoprolol tartrate  25 mg Oral BID    levoFLOXacin  750 mg Oral Every Other Day    remdesivir IVPB  100 mg Intravenous Q24H    hydroxyurea  1,000 mg Oral Daily     Continuous Infusions:   sodium chloride 75 mL/hr at 20 0352     PRN Meds:LORazepam, guaiFENesin-dextromethorphan, heparin (porcine), heparin (porcine), HYDROcodone 5 mg - acetaminophen, ondansetron, acetaminophen, sodium chloride    OBJECTIVE:  BP (!) 106/55   Pulse 79   Temp 97.4 °F (36.3 °C) (Oral)   Resp 16   Ht 5' 5\" (1.651 m)   Wt 188 lb (85.3 kg)   SpO2 96%   BMI 31.28 kg/m²   Temp  Av.6 °F (36.4 °C)  Min: 97.4 °F (36.3 °C)  Max: 97.8 °F (36.6 °C)  Constitutional: The patient is lying in bed on her left side, sleeping. I did not wake her up. 3 L nasal cannula. Skin: Warm and dry. No rashes were noted. Lying on the couch. HEENT: Eyes closed. Neck: Supple to movements. Chest: No respiratory distress. No crackles heard today. Cardiovascular: Heart sounds rhythmic and regular. Abdomen: Positive bowel sounds to auscultation. Extremities: No edema.   Lines: peripheral    Laboratory and Tests Review:  Lab Results   Component Value Date    WBC 5.3 2020    WBC 8.3 2020    WBC 13.6 (H) 2020    HGB 8.6 (L) 2020    HCT 29.7 (L) 2020    .1 (H) 2020     (H) 2020     Lab Results   Component Value Date    NEUTROABS 3.79 12/03/2020    NEUTROABS 7.89 (H) 12/02/2020    NEUTROABS 12.78 (H) 12/01/2020     No results found for: CRPHS  Lab Results   Component Value Date    ALT 15 12/03/2020    AST 64 (H) 12/03/2020    ALKPHOS 67 12/03/2020    BILITOT 0.3 12/03/2020     Lab Results   Component Value Date     12/03/2020    K 3.7 12/03/2020    K 3.7 12/03/2020    CL 97 12/03/2020    CO2 32 12/03/2020    BUN 48 12/03/2020    CREATININE 1.7 12/03/2020    CREATININE 2.0 12/02/2020    CREATININE 2.9 12/01/2020    CREATININE 2.8 12/01/2020    GFRAA 36 12/03/2020    LABGLOM 30 12/03/2020    GLUCOSE 138 12/03/2020    PROT 6.5 12/03/2020    LABALBU 2.5 12/03/2020    CALCIUM 7.9 12/03/2020    BILITOT 0.3 12/03/2020    ALKPHOS 67 12/03/2020    AST 64 12/03/2020    ALT 15 12/03/2020     Lab Results   Component Value Date    CRP 4.5 (H) 12/03/2020    CRP 7.2 (H) 12/02/2020    CRP 15.2 (H) 12/01/2020     Lab Results   Component Value Date    SEDRATE 83 (H) 12/03/2020    SEDRATE 89 (H) 11/30/2020    SEDRATE 93 (H) 02/22/2020     Radiology:      Microbiology:   Blood cultures 11/30/2020: Negative so far  Respiratory panel: SARS-CoV-2 detected   Streptococcus pneumoniae/Legionella urine Ag: negative   Nares screen MRSA: Negative    Recent Labs     11/30/20  1900   PROCAL 8.63*       ASSESSMENT:  · SARS-CoV-2 infection with viral pneumonia  · Probable superimposed bacterial pneumonia right lower lobe  · Myeloproliferative disorder  · Lymphopenia  · Immunocompromised host  · CKD    PLAN:  · Continue Remdesivir, day 4  · Continue Levofloxacin, day 4 of 7  · Anticoagulation  · Steroids  · The patient can go home from [place] [id number]  1:12 PM  12/3/2020 Home with home care

## (undated) DEVICE — GAUZE,SPONGE,4"X4",16PLY,XRAY,STRL,LF: Brand: MEDLINE

## (undated) DEVICE — 4-PORT MANIFOLD: Brand: NEPTUNE 2

## (undated) DEVICE — DECANTER: Brand: UNBRANDED

## (undated) DEVICE — DRAPE C ARM W41XL74IN UNIV MOB W RUBBERBAND CLP

## (undated) DEVICE — TOWEL,OR,DSP,ST,BLUE,STD,6/PK,12PK/CS: Brand: MEDLINE

## (undated) DEVICE — GUIDEWIRE URO L150CM DIA0.035IN TAPR 3CM NIT HYDRPHLC ANG

## (undated) DEVICE — SYRINGE 20ML LL S/C 50

## (undated) DEVICE — INTENDED FOR TISSUE SEPARATION, AND OTHER PROCEDURES THAT REQUIRE A SHARP SURGICAL BLADE TO PUNCTURE OR CUT.: Brand: BARD-PARKER ® STAINLESS STEEL BLADES

## (undated) DEVICE — 18GA (1.3MM OD: 1.0MM ID) X 7CM INTRODUCER18GA X 7CM NEEDLENEEDLE: Brand: INTRODUCER NEEDLEINTRODUCER NEEDLE

## (undated) DEVICE — COUNTER NDL 30 COUNT DBL MAG

## (undated) DEVICE — CHLORAPREP 26ML ORANGE

## (undated) DEVICE — BANDAGE ADH W0.75XL3IN UNIV WVN FAB NAT GEN USE STRP N ADH

## (undated) DEVICE — GUIDEWIRE VASC L180CM DIA0.035IN TIP L5CM PTFE COAT S STL

## (undated) DEVICE — ELECTRODE PT RET AD L9FT HI MOIST COND ADH HYDRGEL CORDED

## (undated) DEVICE — SYRINGE MED 10ML POLYPR LUERSLIP TIP FLAT TOP W/O SFTY DISP

## (undated) DEVICE — COVER,TABLE,60X90,STERILE: Brand: MEDLINE

## (undated) DEVICE — SYRINGE, LUER LOCK, 10ML: Brand: MEDLINE

## (undated) DEVICE — GAUZE,SPONGE,4"X4",8PLY,STRL,LF,10/TRAY: Brand: MEDLINE

## (undated) DEVICE — SOLUTION IV 100ML 0.9% SOD CHL PLAS CONT USP VIAFLX 1 PER

## (undated) DEVICE — SHEET, T, LAPAROTOMY, STERILE: Brand: MEDLINE

## (undated) DEVICE — COVER HNDL LT DISP

## (undated) DEVICE — BLADE CLIPPER SURG SENSICLIP

## (undated) DEVICE — GOWN,SIRUS,FABRNF,XL,20/CS: Brand: MEDLINE

## (undated) DEVICE — 3M™ TEGADERM™ TRANSPARENT FILM DRESSING FRAME STYLE, 1626W, 4 IN X 4-3/4 IN (10 CM X 12 CM), 50/CT 4CT/CASE: Brand: 3M™ TEGADERM™

## (undated) DEVICE — 18 GA N.G. KIT, 10 PACK: Brand: SITE-RITE

## (undated) DEVICE — PINNACLE INTRODUCER SHEATH: Brand: PINNACLE

## (undated) DEVICE — DOUBLE BASIN SET: Brand: MEDLINE INDUSTRIES, INC.

## (undated) DEVICE — COVER,LIGHT HANDLE,FLX,2/PK: Brand: MEDLINE INDUSTRIES, INC.

## (undated) DEVICE — GUIDEWIRE VASC L180CM DIA0.035IN TIP L7CM PTFE S STL STR

## (undated) DEVICE — MARKER,SKIN,WI/RULER AND LABELS: Brand: MEDLINE